# Patient Record
Sex: FEMALE
[De-identification: names, ages, dates, MRNs, and addresses within clinical notes are randomized per-mention and may not be internally consistent; named-entity substitution may affect disease eponyms.]

---

## 2018-03-06 ENCOUNTER — LAB SERVICES (OUTPATIENT)
Dept: OTHER | Age: 58
End: 2018-03-06

## 2018-03-06 ENCOUNTER — CHARTING TRANS (OUTPATIENT)
Dept: OTHER | Age: 58
End: 2018-03-06

## 2018-03-06 LAB — RAPID STREP GROUP A: NORMAL

## 2018-11-01 VITALS
DIASTOLIC BLOOD PRESSURE: 74 MMHG | HEART RATE: 64 BPM | BODY MASS INDEX: 26.73 KG/M2 | RESPIRATION RATE: 16 BRPM | SYSTOLIC BLOOD PRESSURE: 102 MMHG | OXYGEN SATURATION: 98 % | HEIGHT: 67 IN | TEMPERATURE: 98.5 F | WEIGHT: 170.3 LBS

## 2019-02-19 ENCOUNTER — OFFICE VISIT (OUTPATIENT)
Dept: INTERNAL MEDICINE CLINIC | Facility: CLINIC | Age: 59
End: 2019-02-19
Payer: COMMERCIAL

## 2019-02-19 ENCOUNTER — HOSPITAL ENCOUNTER (OUTPATIENT)
Dept: GENERAL RADIOLOGY | Facility: HOSPITAL | Age: 59
Discharge: HOME | End: 2019-02-19
Attending: PHYSICIAN ASSISTANT
Payer: COMMERCIAL

## 2019-02-19 ENCOUNTER — APPOINTMENT (OUTPATIENT)
Dept: LAB | Facility: HOSPITAL | Age: 59
End: 2019-02-19
Attending: PHYSICIAN ASSISTANT
Payer: COMMERCIAL

## 2019-02-19 VITALS
DIASTOLIC BLOOD PRESSURE: 77 MMHG | HEIGHT: 67 IN | SYSTOLIC BLOOD PRESSURE: 132 MMHG | WEIGHT: 168 LBS | HEART RATE: 57 BPM | BODY MASS INDEX: 26.37 KG/M2

## 2019-02-19 DIAGNOSIS — R07.89 OTHER CHEST PAIN: ICD-10-CM

## 2019-02-19 DIAGNOSIS — Z12.31 VISIT FOR SCREENING MAMMOGRAM: ICD-10-CM

## 2019-02-19 DIAGNOSIS — Z00.00 ROUTINE PHYSICAL EXAMINATION: Primary | ICD-10-CM

## 2019-02-19 DIAGNOSIS — Z78.0 MENOPAUSE: ICD-10-CM

## 2019-02-19 DIAGNOSIS — Z12.4 CERVICAL CANCER SCREENING: ICD-10-CM

## 2019-02-19 PROCEDURE — 99396 PREV VISIT EST AGE 40-64: CPT | Performed by: PHYSICIAN ASSISTANT

## 2019-02-19 PROCEDURE — 71046 X-RAY EXAM CHEST 2 VIEWS: CPT | Performed by: PHYSICIAN ASSISTANT

## 2019-02-19 PROCEDURE — 93005 ELECTROCARDIOGRAM TRACING: CPT

## 2019-02-19 PROCEDURE — 93010 ELECTROCARDIOGRAM REPORT: CPT | Performed by: PHYSICIAN ASSISTANT

## 2019-02-19 NOTE — PROGRESS NOTES
HPI:    Patient ID: Porter Zhu is a 59year old female. HPI   Patient presents today requesting physical exam.  She has no significant past medical history. She has not been seen in the office since 2/26/16.  At that time underwent physical exam.  Lab chest pain. Negative for palpitations and leg swelling. Gastrointestinal: Negative for nausea, vomiting, abdominal pain and diarrhea. Genitourinary: Negative for dysuria, frequency and hematuria. Musculoskeletal: Positive for myalgias (right arm).  Ne adnexum displays no mass, no tenderness and no fullness. Musculoskeletal: Normal range of motion. She exhibits no tenderness. Lymphadenopathy:     She has no cervical adenopathy. Neurological: She is alert and oriented to person, place, and time.  No Menopause  Currently asymptomatic. Orders for repeat dexa scan generated. - XR DEXA BONE DENSITOMETRY (CPT=77080); Future    5. Cervical cancer screening  Bimanual exam unremarkable. Pap/HPV collected and will contact patient with results.  If normal, rep

## 2019-02-20 LAB — HPV I/H RISK 1 DNA SPEC QL NAA+PROBE: NEGATIVE

## 2019-02-23 ENCOUNTER — LAB ENCOUNTER (OUTPATIENT)
Dept: LAB | Facility: HOSPITAL | Age: 59
End: 2019-02-23
Attending: PHYSICIAN ASSISTANT
Payer: COMMERCIAL

## 2019-02-23 DIAGNOSIS — Z00.00 ROUTINE PHYSICAL EXAMINATION: ICD-10-CM

## 2019-02-23 LAB
ALBUMIN SERPL-MCNC: 3.9 G/DL (ref 3.4–5)
ALBUMIN/GLOB SERPL: 1.3 {RATIO} (ref 1–2)
ALP LIVER SERPL-CCNC: 50 U/L (ref 46–118)
ALT SERPL-CCNC: 24 U/L (ref 13–56)
ANION GAP SERPL CALC-SCNC: 4 MMOL/L (ref 0–18)
AST SERPL-CCNC: 15 U/L (ref 15–37)
BACTERIA UR QL AUTO: NEGATIVE /HPF
BASOPHILS # BLD AUTO: 0.06 X10(3) UL (ref 0–0.2)
BASOPHILS NFR BLD AUTO: 0.9 %
BILIRUB SERPL-MCNC: 0.5 MG/DL (ref 0.1–2)
BILIRUB UR QL: NEGATIVE
BUN BLD-MCNC: 20 MG/DL (ref 7–18)
BUN/CREAT SERPL: 19.8 (ref 10–20)
CALCIUM BLD-MCNC: 9.7 MG/DL (ref 8.5–10.1)
CHLORIDE SERPL-SCNC: 111 MMOL/L (ref 98–107)
CHOLEST SMN-MCNC: 182 MG/DL (ref ?–200)
CLARITY UR: CLEAR
CO2 SERPL-SCNC: 28 MMOL/L (ref 21–32)
COLOR UR: YELLOW
CREAT BLD-MCNC: 1.01 MG/DL (ref 0.55–1.02)
DEPRECATED RDW RBC AUTO: 43 FL (ref 35.1–46.3)
EOSINOPHIL # BLD AUTO: 0.3 X10(3) UL (ref 0–0.7)
EOSINOPHIL NFR BLD AUTO: 4.3 %
ERYTHROCYTE [DISTWIDTH] IN BLOOD BY AUTOMATED COUNT: 12.3 % (ref 11–15)
GLOBULIN PLAS-MCNC: 3 G/DL (ref 2.8–4.4)
GLUCOSE BLD-MCNC: 99 MG/DL (ref 70–99)
GLUCOSE UR-MCNC: NEGATIVE MG/DL
HCT VFR BLD AUTO: 46.6 % (ref 35–48)
HDLC SERPL-MCNC: 44 MG/DL (ref 40–59)
HGB BLD-MCNC: 15.1 G/DL (ref 12–16)
HGB UR QL STRIP.AUTO: NEGATIVE
IMM GRANULOCYTES # BLD AUTO: 0.03 X10(3) UL (ref 0–1)
IMM GRANULOCYTES NFR BLD: 0.4 %
KETONES UR-MCNC: NEGATIVE MG/DL
LDLC SERPL CALC-MCNC: 118 MG/DL (ref ?–100)
LYMPHOCYTES # BLD AUTO: 2.51 X10(3) UL (ref 1–4)
LYMPHOCYTES NFR BLD AUTO: 36.3 %
M PROTEIN MFR SERPL ELPH: 6.9 G/DL (ref 6.4–8.2)
MCH RBC QN AUTO: 30.9 PG (ref 26–34)
MCHC RBC AUTO-ENTMCNC: 32.4 G/DL (ref 31–37)
MCV RBC AUTO: 95.5 FL (ref 80–100)
MONOCYTES # BLD AUTO: 0.48 X10(3) UL (ref 0.1–1)
MONOCYTES NFR BLD AUTO: 6.9 %
NEUTROPHILS # BLD AUTO: 3.54 X10 (3) UL (ref 1.5–7.7)
NEUTROPHILS # BLD AUTO: 3.54 X10(3) UL (ref 1.5–7.7)
NEUTROPHILS NFR BLD AUTO: 51.2 %
NITRITE UR QL STRIP.AUTO: NEGATIVE
NONHDLC SERPL-MCNC: 138 MG/DL (ref ?–130)
OSMOLALITY SERPL CALC.SUM OF ELEC: 299 MOSM/KG (ref 275–295)
PH UR: 5 [PH] (ref 5–8)
PLATELET # BLD AUTO: 180 10(3)UL (ref 150–450)
POTASSIUM SERPL-SCNC: 4.5 MMOL/L (ref 3.5–5.1)
PROT UR-MCNC: NEGATIVE MG/DL
RBC # BLD AUTO: 4.88 X10(6)UL (ref 3.8–5.3)
RBC #/AREA URNS AUTO: <1 /HPF
SODIUM SERPL-SCNC: 143 MMOL/L (ref 136–145)
SP GR UR STRIP: 1.02 (ref 1–1.03)
T4 FREE SERPL-MCNC: 0.9 NG/DL (ref 0.8–1.7)
TRIGL SERPL-MCNC: 98 MG/DL (ref 30–149)
TSI SER-ACNC: 4.01 MIU/ML (ref 0.36–3.74)
UROBILINOGEN UR STRIP-ACNC: <2
VIT C UR-MCNC: 40 MG/DL
VLDLC SERPL CALC-MCNC: 20 MG/DL (ref 0–30)
WBC # BLD AUTO: 6.9 X10(3) UL (ref 4–11)
WBC #/AREA URNS AUTO: 1 /HPF

## 2019-02-23 PROCEDURE — 84439 ASSAY OF FREE THYROXINE: CPT

## 2019-02-23 PROCEDURE — 80061 LIPID PANEL: CPT

## 2019-02-23 PROCEDURE — 36415 COLL VENOUS BLD VENIPUNCTURE: CPT

## 2019-02-23 PROCEDURE — 85025 COMPLETE CBC W/AUTO DIFF WBC: CPT

## 2019-02-23 PROCEDURE — 80053 COMPREHEN METABOLIC PANEL: CPT

## 2019-02-23 PROCEDURE — 84443 ASSAY THYROID STIM HORMONE: CPT

## 2019-02-23 PROCEDURE — 81001 URINALYSIS AUTO W/SCOPE: CPT

## 2019-03-04 ENCOUNTER — HOSPITAL ENCOUNTER (OUTPATIENT)
Dept: BONE DENSITY | Facility: HOSPITAL | Age: 59
Discharge: HOME | End: 2019-03-04
Attending: PHYSICIAN ASSISTANT
Payer: COMMERCIAL

## 2019-03-04 DIAGNOSIS — Z78.0 MENOPAUSE: ICD-10-CM

## 2019-03-04 PROCEDURE — 77080 DXA BONE DENSITY AXIAL: CPT | Performed by: PHYSICIAN ASSISTANT

## 2019-03-20 ENCOUNTER — HOSPITAL ENCOUNTER (OUTPATIENT)
Dept: MAMMOGRAPHY | Facility: HOSPITAL | Age: 59
Discharge: HOME | End: 2019-03-20
Attending: PHYSICIAN ASSISTANT
Payer: COMMERCIAL

## 2019-03-20 DIAGNOSIS — Z12.31 VISIT FOR SCREENING MAMMOGRAM: ICD-10-CM

## 2019-03-20 PROCEDURE — 77067 SCR MAMMO BI INCL CAD: CPT | Performed by: PHYSICIAN ASSISTANT

## 2019-03-20 PROCEDURE — 77063 BREAST TOMOSYNTHESIS BI: CPT | Performed by: PHYSICIAN ASSISTANT

## 2019-03-26 ENCOUNTER — HOSPITAL ENCOUNTER (OUTPATIENT)
Dept: CT IMAGING | Age: 59
Discharge: HOME | End: 2019-03-26
Attending: PHYSICIAN ASSISTANT

## 2019-03-26 DIAGNOSIS — Z13.9 SCREENING PROCEDURE: ICD-10-CM

## 2019-03-26 NOTE — PROGRESS NOTES
[facility] [address] SCORE=0  AY=872/70  Chol[place] labs as follows:labs done 2/23/19  TC=  HDL=  LDL=  TG=  GLUCOSE=  All results and risk factors discussed with patient; all questions and concerns addressed.   Educational handout

## 2019-03-29 ENCOUNTER — HOSPITAL ENCOUNTER (OUTPATIENT)
Dept: MAMMOGRAPHY | Facility: HOSPITAL | Age: 59
Discharge: HOME | End: 2019-03-29
Attending: PHYSICIAN ASSISTANT
Payer: COMMERCIAL

## 2019-03-29 ENCOUNTER — HOSPITAL ENCOUNTER (OUTPATIENT)
Dept: ULTRASOUND IMAGING | Facility: HOSPITAL | Age: 59
Discharge: HOME | End: 2019-03-29
Attending: PHYSICIAN ASSISTANT
Payer: COMMERCIAL

## 2019-03-29 DIAGNOSIS — N64.89 BREAST ASYMMETRY: ICD-10-CM

## 2019-03-29 DIAGNOSIS — R92.8 ABNORMALITY OF RIGHT BREAST ON SCREENING MAMMOGRAM: ICD-10-CM

## 2019-03-29 PROCEDURE — 76642 ULTRASOUND BREAST LIMITED: CPT | Performed by: PHYSICIAN ASSISTANT

## 2019-03-29 PROCEDURE — 77061 BREAST TOMOSYNTHESIS UNI: CPT | Performed by: PHYSICIAN ASSISTANT

## 2019-03-29 PROCEDURE — 77065 DX MAMMO INCL CAD UNI: CPT | Performed by: PHYSICIAN ASSISTANT

## 2020-02-21 ENCOUNTER — OFFICE VISIT (OUTPATIENT)
Dept: FAMILY MEDICINE CLINIC | Facility: CLINIC | Age: 60
End: 2020-02-21
Payer: COMMERCIAL

## 2020-02-21 VITALS
WEIGHT: 172 LBS | OXYGEN SATURATION: 97 % | BODY MASS INDEX: 27 KG/M2 | HEART RATE: 52 BPM | RESPIRATION RATE: 16 BRPM | SYSTOLIC BLOOD PRESSURE: 128 MMHG | HEIGHT: 67 IN | DIASTOLIC BLOOD PRESSURE: 72 MMHG | TEMPERATURE: 97 F

## 2020-02-21 DIAGNOSIS — N60.09 CYST OF BREAST, UNSPECIFIED LATERALITY: ICD-10-CM

## 2020-02-21 DIAGNOSIS — J40 BRONCHITIS: ICD-10-CM

## 2020-02-21 DIAGNOSIS — Z00.00 ROUTINE GENERAL MEDICAL EXAMINATION AT A HEALTH CARE FACILITY: Primary | ICD-10-CM

## 2020-02-21 DIAGNOSIS — Z12.31 VISIT FOR SCREENING MAMMOGRAM: ICD-10-CM

## 2020-02-21 PROCEDURE — 99386 PREV VISIT NEW AGE 40-64: CPT | Performed by: PHYSICIAN ASSISTANT

## 2020-02-21 RX ORDER — BENZONATATE 200 MG/1
200 CAPSULE ORAL 3 TIMES DAILY PRN
Qty: 30 CAPSULE | Refills: 0 | Status: SHIPPED | OUTPATIENT
Start: 2020-02-21 | End: 2020-03-14

## 2020-02-21 NOTE — PROGRESS NOTES
Tammie Enriquez is a 62year old female. Patient presents with:  Physical      HPI:   Patient presents today requesting physical exam. Overall feeling well. Has concerns today about owing sinus symptoms and nonproductive cough.   2 weeks ago she started havin status: Never Smoker      Smokeless tobacco: Never Used    Alcohol use: Yes      Alcohol/week: 0.0 standard drinks      Comment: 2 drinks a month    Drug use: No       REVIEW OF SYSTEMS:   GENERAL HEALTH: Denies fevers, chills, sweats, and fatigue.   EYES: hernia. ; Anabelle normal with no erythema or tenderness, no discharge. No CMT, uterus with no palpable abnormalities. Adnexal fullness or mass. MUSK: No cyanosis. Distal pulses intact bilaterally. Normal spinal curvature.  No midline or paraspinal tende

## 2020-03-07 ENCOUNTER — LAB ENCOUNTER (OUTPATIENT)
Dept: LAB | Facility: HOSPITAL | Age: 60
End: 2020-03-07
Attending: PHYSICIAN ASSISTANT
Payer: COMMERCIAL

## 2020-03-07 DIAGNOSIS — Z00.00 ROUTINE GENERAL MEDICAL EXAMINATION AT A HEALTH CARE FACILITY: ICD-10-CM

## 2020-03-07 LAB
ALBUMIN SERPL-MCNC: 3.9 G/DL (ref 3.4–5)
ALBUMIN/GLOB SERPL: 1.1 {RATIO} (ref 1–2)
ALP LIVER SERPL-CCNC: 58 U/L (ref 50–130)
ALT SERPL-CCNC: 36 U/L (ref 13–56)
ANION GAP SERPL CALC-SCNC: 4 MMOL/L (ref 0–18)
AST SERPL-CCNC: 14 U/L (ref 15–37)
BASOPHILS # BLD AUTO: 0.12 X10(3) UL (ref 0–0.2)
BASOPHILS NFR BLD AUTO: 0.9 %
BILIRUB SERPL-MCNC: 0.4 MG/DL (ref 0.1–2)
BUN BLD-MCNC: 23 MG/DL (ref 7–18)
BUN/CREAT SERPL: 23.7 (ref 10–20)
CALCIUM BLD-MCNC: 10 MG/DL (ref 8.5–10.1)
CHLORIDE SERPL-SCNC: 110 MMOL/L (ref 98–112)
CHOLEST SMN-MCNC: 192 MG/DL (ref ?–200)
CO2 SERPL-SCNC: 28 MMOL/L (ref 21–32)
CREAT BLD-MCNC: 0.97 MG/DL (ref 0.55–1.02)
DEPRECATED RDW RBC AUTO: 44.2 FL (ref 35.1–46.3)
EOSINOPHIL # BLD AUTO: 0.37 X10(3) UL (ref 0–0.7)
EOSINOPHIL NFR BLD AUTO: 2.9 %
ERYTHROCYTE [DISTWIDTH] IN BLOOD BY AUTOMATED COUNT: 12.5 % (ref 11–15)
GLOBULIN PLAS-MCNC: 3.4 G/DL (ref 2.8–4.4)
GLUCOSE BLD-MCNC: 88 MG/DL (ref 70–99)
HCT VFR BLD AUTO: 45 % (ref 35–48)
HDLC SERPL-MCNC: 45 MG/DL (ref 40–59)
HGB BLD-MCNC: 14.6 G/DL (ref 12–16)
IMM GRANULOCYTES # BLD AUTO: 0.09 X10(3) UL (ref 0–1)
IMM GRANULOCYTES NFR BLD: 0.7 %
LDLC SERPL CALC-MCNC: 121 MG/DL (ref ?–100)
LYMPHOCYTES # BLD AUTO: 2.41 X10(3) UL (ref 1–4)
LYMPHOCYTES NFR BLD AUTO: 18.8 %
M PROTEIN MFR SERPL ELPH: 7.3 G/DL (ref 6.4–8.2)
MCH RBC QN AUTO: 31.3 PG (ref 26–34)
MCHC RBC AUTO-ENTMCNC: 32.4 G/DL (ref 31–37)
MCV RBC AUTO: 96.4 FL (ref 80–100)
MONOCYTES # BLD AUTO: 0.61 X10(3) UL (ref 0.1–1)
MONOCYTES NFR BLD AUTO: 4.8 %
NEUTROPHILS # BLD AUTO: 9.21 X10 (3) UL (ref 1.5–7.7)
NEUTROPHILS # BLD AUTO: 9.21 X10(3) UL (ref 1.5–7.7)
NEUTROPHILS NFR BLD AUTO: 71.9 %
NONHDLC SERPL-MCNC: 147 MG/DL (ref ?–130)
OSMOLALITY SERPL CALC.SUM OF ELEC: 297 MOSM/KG (ref 275–295)
PATIENT FASTING Y/N/NP: YES
PATIENT FASTING Y/N/NP: YES
PLATELET # BLD AUTO: 217 10(3)UL (ref 150–450)
POTASSIUM SERPL-SCNC: 4.7 MMOL/L (ref 3.5–5.1)
RBC # BLD AUTO: 4.67 X10(6)UL (ref 3.8–5.3)
SODIUM SERPL-SCNC: 142 MMOL/L (ref 136–145)
TRIGL SERPL-MCNC: 129 MG/DL (ref 30–149)
TSI SER-ACNC: 2.86 MIU/ML (ref 0.36–3.74)
VLDLC SERPL CALC-MCNC: 26 MG/DL (ref 0–30)
WBC # BLD AUTO: 12.8 X10(3) UL (ref 4–11)

## 2020-03-07 PROCEDURE — 36415 COLL VENOUS BLD VENIPUNCTURE: CPT

## 2020-03-07 PROCEDURE — 80061 LIPID PANEL: CPT

## 2020-03-07 PROCEDURE — 84443 ASSAY THYROID STIM HORMONE: CPT

## 2020-03-07 PROCEDURE — 80053 COMPREHEN METABOLIC PANEL: CPT

## 2020-03-07 PROCEDURE — 85025 COMPLETE CBC W/AUTO DIFF WBC: CPT

## 2020-03-09 ENCOUNTER — E-VISIT (OUTPATIENT)
Dept: FAMILY MEDICINE CLINIC | Facility: CLINIC | Age: 60
End: 2020-03-09

## 2020-03-09 DIAGNOSIS — Z02.9 ENCOUNTERS FOR ADMINISTRATIVE PURPOSE: Primary | ICD-10-CM

## 2020-03-09 RX ORDER — AZITHROMYCIN 250 MG/1
TABLET, FILM COATED ORAL
Qty: 6 TABLET | Refills: 0 | Status: SHIPPED | OUTPATIENT
Start: 2020-03-09 | End: 2021-02-17 | Stop reason: ALTCHOICE

## 2020-03-09 NOTE — PROGRESS NOTES
Pt submitted e-visit for cough for 1 month. Pt was seen on 2/21 at PCP office for bronchitis and treated with benzonatate; pt was also having sinus sx. Per visit note, pt was asked to f/u with provider if sx did not improve.   Advised pt to f/u at PCP off

## 2020-03-12 ENCOUNTER — PATIENT MESSAGE (OUTPATIENT)
Dept: FAMILY MEDICINE CLINIC | Facility: CLINIC | Age: 60
End: 2020-03-12

## 2020-03-12 DIAGNOSIS — J40 BRONCHITIS: ICD-10-CM

## 2020-03-13 NOTE — TELEPHONE ENCOUNTER
From: Maria T Allred  To: Lien Garcia PA-C  Sent: 3/12/2020 6:39 PM CDT  Subject: Non-Urgent Medical Question    Camacho Roberts, thank you for the z-pack. I wanted to let you know how I've been feeling.  I felt better after the first dose but have developed a pe

## 2020-03-14 RX ORDER — BENZONATATE 200 MG/1
200 CAPSULE ORAL 3 TIMES DAILY PRN
Qty: 30 CAPSULE | Refills: 0 | Status: SHIPPED | OUTPATIENT
Start: 2020-03-14 | End: 2020-03-16

## 2020-03-16 RX ORDER — BENZONATATE 200 MG/1
200 CAPSULE ORAL 3 TIMES DAILY PRN
Qty: 30 CAPSULE | Refills: 0 | Status: SHIPPED | OUTPATIENT
Start: 2020-03-16 | End: 2021-02-17 | Stop reason: ALTCHOICE

## 2020-07-21 ENCOUNTER — HOSPITAL ENCOUNTER (OUTPATIENT)
Dept: ULTRASOUND IMAGING | Facility: HOSPITAL | Age: 60
Discharge: HOME | End: 2020-07-21
Attending: PHYSICIAN ASSISTANT
Payer: COMMERCIAL

## 2020-07-21 ENCOUNTER — HOSPITAL ENCOUNTER (OUTPATIENT)
Dept: MAMMOGRAPHY | Facility: HOSPITAL | Age: 60
Discharge: HOME | End: 2020-07-21
Attending: PHYSICIAN ASSISTANT
Payer: COMMERCIAL

## 2020-07-21 DIAGNOSIS — N60.09 CYST OF BREAST, UNSPECIFIED LATERALITY: ICD-10-CM

## 2020-07-21 DIAGNOSIS — Z12.31 VISIT FOR SCREENING MAMMOGRAM: ICD-10-CM

## 2020-07-21 PROCEDURE — 76642 ULTRASOUND BREAST LIMITED: CPT | Performed by: PHYSICIAN ASSISTANT

## 2020-07-21 PROCEDURE — 77062 BREAST TOMOSYNTHESIS BI: CPT | Performed by: PHYSICIAN ASSISTANT

## 2020-07-21 PROCEDURE — 77066 DX MAMMO INCL CAD BI: CPT | Performed by: PHYSICIAN ASSISTANT

## 2020-07-21 NOTE — IMAGING NOTE
This nurse introduced self and role of breast coordinator. Discussed recommended breast biopsy with patient. Pt was recommended by Dr Cristina Mosher to have a  right breast ultrasound guided biopsy. Miss Sea Vasquez is single has no children .  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  It is recommended that this patient be evaluated in our [address] [place] [address] to determine   eligibility for additional breast cancer screening, risk reduction strategies and/o technician will use the ultrasound machine to locate the area in question that was seen on your previous breast imaging. The Radiologist will then inject a local numbing medication into the area. This may burn and sting for several seconds .   Then use a n physician unless otherwise indicated. Educated patient that once we receive an order from her physician  our radiology secretaries would be calling   to schedule the biopsy.

## 2020-07-24 ENCOUNTER — HOSPITAL ENCOUNTER (OUTPATIENT)
Dept: ULTRASOUND IMAGING | Facility: HOSPITAL | Age: 60
Discharge: HOME | End: 2020-07-24
Attending: PHYSICIAN ASSISTANT
Payer: COMMERCIAL

## 2020-07-24 ENCOUNTER — HOSPITAL ENCOUNTER (OUTPATIENT)
Dept: MAMMOGRAPHY | Facility: HOSPITAL | Age: 60
Discharge: HOME | End: 2020-07-24
Attending: PHYSICIAN ASSISTANT
Payer: COMMERCIAL

## 2020-07-24 VITALS — HEART RATE: 55 BPM | DIASTOLIC BLOOD PRESSURE: 62 MMHG | SYSTOLIC BLOOD PRESSURE: 114 MMHG

## 2020-07-24 DIAGNOSIS — N63.10 BREAST MASS, RIGHT: ICD-10-CM

## 2020-07-24 PROCEDURE — 19083 BX BREAST 1ST LESION US IMAG: CPT | Performed by: PHYSICIAN ASSISTANT

## 2020-07-24 PROCEDURE — 77065 DX MAMMO INCL CAD UNI: CPT | Performed by: PHYSICIAN ASSISTANT

## 2020-07-24 PROCEDURE — 88305 TISSUE EXAM BY PATHOLOGIST: CPT | Performed by: PHYSICIAN ASSISTANT

## 2020-07-24 NOTE — PROCEDURES
[facility] HOSP - [facility]  Procedure Note    Malorie Later Patient Status:  Outpatient    1960 MRN [id number]   Location [address] Attending Fabricio Jung [employer] Day # 0 PCP Reji Hillman PA-C     Proc yes...

## 2020-07-27 ENCOUNTER — TELEPHONE (OUTPATIENT)
Dept: ULTRASOUND IMAGING | Facility: HOSPITAL | Age: 60
End: 2020-07-27

## 2020-07-27 NOTE — IMAGING NOTE
Miss Percy Davis  is s/p biopsy . Phoned at 1250 Pm no answer  Recalled at [phone] and introduced myself as breast coordinator . Reinforced to patient  post biopsy care and instructions .     Informed  and shared the pathology results as well as the recommendation

## 2020-07-27 NOTE — TELEPHONE ENCOUNTER
Attempted to reach Miss Salena Ward regarding follow up post procedure and to review results and recommendations no answer left message for her to retun call to [phone] to go over results and recommendations

## 2020-12-21 ENCOUNTER — TELEPHONE (OUTPATIENT)
Dept: FAMILY MEDICINE CLINIC | Facility: CLINIC | Age: 60
End: 2020-12-21

## 2020-12-21 DIAGNOSIS — R92.8 FOLLOW-UP EXAMINATION OF ABNORMAL MAMMOGRAM: Primary | ICD-10-CM

## 2021-01-12 ENCOUNTER — HOSPITAL ENCOUNTER (OUTPATIENT)
Dept: MAMMOGRAPHY | Facility: HOSPITAL | Age: 61
Discharge: HOME | End: 2021-01-12
Attending: PHYSICIAN ASSISTANT
Payer: COMMERCIAL

## 2021-01-12 ENCOUNTER — HOSPITAL ENCOUNTER (OUTPATIENT)
Dept: ULTRASOUND IMAGING | Facility: HOSPITAL | Age: 61
Discharge: HOME | End: 2021-01-12
Attending: PHYSICIAN ASSISTANT
Payer: COMMERCIAL

## 2021-01-12 DIAGNOSIS — R92.8 FOLLOW-UP EXAMINATION OF ABNORMAL MAMMOGRAM: ICD-10-CM

## 2021-01-12 PROCEDURE — 76642 ULTRASOUND BREAST LIMITED: CPT | Performed by: PHYSICIAN ASSISTANT

## 2021-01-12 PROCEDURE — 77061 BREAST TOMOSYNTHESIS UNI: CPT | Performed by: PHYSICIAN ASSISTANT

## 2021-01-12 PROCEDURE — 77065 DX MAMMO INCL CAD UNI: CPT | Performed by: PHYSICIAN ASSISTANT

## 2021-02-17 ENCOUNTER — OFFICE VISIT (OUTPATIENT)
Dept: FAMILY MEDICINE CLINIC | Facility: CLINIC | Age: 61
End: 2021-02-17
Payer: COMMERCIAL

## 2021-02-17 ENCOUNTER — LAB ENCOUNTER (OUTPATIENT)
Dept: LAB | Age: 61
End: 2021-02-17
Attending: PHYSICIAN ASSISTANT
Payer: COMMERCIAL

## 2021-02-17 VITALS
HEIGHT: 67 IN | SYSTOLIC BLOOD PRESSURE: 116 MMHG | HEART RATE: 62 BPM | DIASTOLIC BLOOD PRESSURE: 80 MMHG | OXYGEN SATURATION: 94 % | BODY MASS INDEX: 26.21 KG/M2 | WEIGHT: 167 LBS | RESPIRATION RATE: 16 BRPM

## 2021-02-17 DIAGNOSIS — Z00.00 ROUTINE GENERAL MEDICAL EXAMINATION AT A HEALTH CARE FACILITY: ICD-10-CM

## 2021-02-17 DIAGNOSIS — R92.2 DENSE BREAST TISSUE ON MAMMOGRAM: ICD-10-CM

## 2021-02-17 DIAGNOSIS — Z12.11 COLON CANCER SCREENING: ICD-10-CM

## 2021-02-17 DIAGNOSIS — Z00.00 ROUTINE GENERAL MEDICAL EXAMINATION AT A HEALTH CARE FACILITY: Primary | ICD-10-CM

## 2021-02-17 DIAGNOSIS — R92.8 ABNORMAL MAMMOGRAM OF RIGHT BREAST: ICD-10-CM

## 2021-02-17 LAB
ALBUMIN SERPL-MCNC: 4.1 G/DL (ref 3.4–5)
ALBUMIN/GLOB SERPL: 1.3 {RATIO} (ref 1–2)
ALP LIVER SERPL-CCNC: 49 U/L
ALT SERPL-CCNC: 22 U/L
ANION GAP SERPL CALC-SCNC: 5 MMOL/L (ref 0–18)
AST SERPL-CCNC: 10 U/L (ref 15–37)
BASOPHILS # BLD AUTO: 0.11 X10(3) UL (ref 0–0.2)
BASOPHILS NFR BLD AUTO: 1.1 %
BILIRUB SERPL-MCNC: 0.5 MG/DL (ref 0.1–2)
BUN BLD-MCNC: 16 MG/DL (ref 7–18)
BUN/CREAT SERPL: 16.3 (ref 10–20)
CALCIUM BLD-MCNC: 9.8 MG/DL (ref 8.5–10.1)
CHLORIDE SERPL-SCNC: 110 MMOL/L (ref 98–112)
CHOLEST SMN-MCNC: 195 MG/DL (ref ?–200)
CO2 SERPL-SCNC: 27 MMOL/L (ref 21–32)
CREAT BLD-MCNC: 0.98 MG/DL
DEPRECATED RDW RBC AUTO: 45.8 FL (ref 35.1–46.3)
EOSINOPHIL # BLD AUTO: 0.32 X10(3) UL (ref 0–0.7)
EOSINOPHIL NFR BLD AUTO: 3.1 %
ERYTHROCYTE [DISTWIDTH] IN BLOOD BY AUTOMATED COUNT: 12.8 % (ref 11–15)
GLOBULIN PLAS-MCNC: 3.1 G/DL (ref 2.8–4.4)
GLUCOSE BLD-MCNC: 91 MG/DL (ref 70–99)
HCT VFR BLD AUTO: 46.3 %
HDLC SERPL-MCNC: 48 MG/DL (ref 40–59)
HGB BLD-MCNC: 14.9 G/DL
IMM GRANULOCYTES # BLD AUTO: 0.05 X10(3) UL (ref 0–1)
IMM GRANULOCYTES NFR BLD: 0.5 %
LDLC SERPL CALC-MCNC: 124 MG/DL (ref ?–100)
LYMPHOCYTES # BLD AUTO: 1.99 X10(3) UL (ref 1–4)
LYMPHOCYTES NFR BLD AUTO: 19 %
M PROTEIN MFR SERPL ELPH: 7.2 G/DL (ref 6.4–8.2)
MCH RBC QN AUTO: 31.1 PG (ref 26–34)
MCHC RBC AUTO-ENTMCNC: 32.2 G/DL (ref 31–37)
MCV RBC AUTO: 96.7 FL
MONOCYTES # BLD AUTO: 0.62 X10(3) UL (ref 0.1–1)
MONOCYTES NFR BLD AUTO: 5.9 %
NEUTROPHILS # BLD AUTO: 7.38 X10 (3) UL (ref 1.5–7.7)
NEUTROPHILS # BLD AUTO: 7.38 X10(3) UL (ref 1.5–7.7)
NEUTROPHILS NFR BLD AUTO: 70.4 %
NONHDLC SERPL-MCNC: 147 MG/DL (ref ?–130)
OSMOLALITY SERPL CALC.SUM OF ELEC: 295 MOSM/KG (ref 275–295)
PATIENT FASTING Y/N/NP: YES
PATIENT FASTING Y/N/NP: YES
PLATELET # BLD AUTO: 176 10(3)UL (ref 150–450)
POTASSIUM SERPL-SCNC: 4.4 MMOL/L (ref 3.5–5.1)
RBC # BLD AUTO: 4.79 X10(6)UL
SODIUM SERPL-SCNC: 142 MMOL/L (ref 136–145)
TRIGL SERPL-MCNC: 113 MG/DL (ref 30–149)
TSI SER-ACNC: 3.22 MIU/ML (ref 0.36–3.74)
VIT B12 SERPL-MCNC: 751 PG/ML (ref 193–986)
VIT D+METAB SERPL-MCNC: 43.2 NG/ML (ref 30–100)
VLDLC SERPL CALC-MCNC: 23 MG/DL (ref 0–30)
WBC # BLD AUTO: 10.5 X10(3) UL (ref 4–11)

## 2021-02-17 PROCEDURE — 3079F DIAST BP 80-89 MM HG: CPT | Performed by: PHYSICIAN ASSISTANT

## 2021-02-17 PROCEDURE — 3008F BODY MASS INDEX DOCD: CPT | Performed by: PHYSICIAN ASSISTANT

## 2021-02-17 PROCEDURE — 99396 PREV VISIT EST AGE 40-64: CPT | Performed by: PHYSICIAN ASSISTANT

## 2021-02-17 PROCEDURE — 36415 COLL VENOUS BLD VENIPUNCTURE: CPT

## 2021-02-17 PROCEDURE — 3074F SYST BP LT 130 MM HG: CPT | Performed by: PHYSICIAN ASSISTANT

## 2021-02-17 PROCEDURE — 82607 VITAMIN B-12: CPT

## 2021-02-17 PROCEDURE — 85025 COMPLETE CBC W/AUTO DIFF WBC: CPT

## 2021-02-17 PROCEDURE — 84443 ASSAY THYROID STIM HORMONE: CPT

## 2021-02-17 PROCEDURE — 80061 LIPID PANEL: CPT

## 2021-02-17 PROCEDURE — 80053 COMPREHEN METABOLIC PANEL: CPT

## 2021-02-17 PROCEDURE — 82306 VITAMIN D 25 HYDROXY: CPT

## 2021-02-17 NOTE — PROGRESS NOTES
HPI:    Patient ID: Eamon Villalba is a 56year old female. HPI   Patient presents today requesting physical exam.  Feeling well in general.  No concerns for discussion.     Diet: well balanced  Exercise: walking daily  Tobacco use: denies  Drug use: prabha Normocephalic and atraumatic.    Right Ear: Tympanic membrane and external ear normal.   Left Ear: Tympanic membrane and external ear normal.   Nose: Nose normal.   Mouth/Throat: Oropharynx is clear and moist.   Eyes: Pupils are equal, round, and reactive t physicals. - CBC WITH DIFFERENTIAL WITH PLATELET; Future  - COMP METABOLIC PANEL (14); Future  - LIPID PANEL; Future  - TSH W REFLEX TO FREE T4; Future  - VITAMIN B12; Future  - VITAMIN D, 25-HYDROXY; Future    2.  Colon cancer screening  Due for repeat c-

## 2021-03-02 ENCOUNTER — TELEPHONE (OUTPATIENT)
Dept: GASTROENTEROLOGY | Facility: CLINIC | Age: 61
End: 2021-03-02

## 2021-03-02 DIAGNOSIS — Z80.0 FAMILY HISTORY OF COLON CANCER: Primary | ICD-10-CM

## 2021-03-02 NOTE — TELEPHONE ENCOUNTER
ADMITTED:   04/08/2016        GI PROCEDURE NOTE     DATE OF PROCEDURE:  04/08/2016     PROCEDURE: Colonoscopy. SURGEON:   Caroline Javed MD     ATTENDING:  Camilo Marsh M.D. PREOPERATIVE DIAGNOSIS:  Family history of colon cancer.      POSTO Consuel[address] Weems 5        JOB NUMBER:         [id number]  ZEYNEP KAPLAN [facility] #: [id number]  MR #: [id number]                  Patient: Higinio Pablo      ===========================================

## 2021-03-02 NOTE — TELEPHONE ENCOUNTER
Pt called to schedule 5 yr repeat colonoscopy. Pt previously saw Dr. Deniz Dickey. Pt does not want to schedule office visit. Can she just schedule procedure? Please call.

## 2021-03-02 NOTE — TELEPHONE ENCOUNTER
Bisi Johns-    Patient called in to schedule 5 year CLN recall. Last done with Dr. Pamela Horvath 04/08/2016. Verified and confirmed allergies, medication list and preferred pharmacy.      Last Procedure, Date, MD:  CLN done 04/08/2016 with Dr. Cem Acevedo

## 2021-03-03 RX ORDER — SODIUM, POTASSIUM,MAG SULFATES 17.5-3.13G
SOLUTION, RECONSTITUTED, ORAL ORAL
Qty: 1 BOTTLE | Refills: 0 | Status: ON HOLD | OUTPATIENT
Start: 2021-03-03 | End: 2021-03-24

## 2021-03-03 NOTE — TELEPHONE ENCOUNTER
Scheduling:  cln w/ iv or mac w/ Dr. Bush Rank  Dx: Ignacio Preston h/o colon ca  suprep sent e-scribe

## 2021-03-05 NOTE — TELEPHONE ENCOUNTER
Patient calling for update on scheduling. Patient also wants to add she has a booster covid schedule Monday wants to ensure this will not interfere with colonoscopy. Please call at:[phone],thanks.

## 2021-03-05 NOTE — TELEPHONE ENCOUNTER
Scheduled for:  Colonoscopy - 38125  Provider Name:  Dr. Stacie Ryan  Date:  3/24/21  Location:  [facility]  Sedation:  MAC  Time:  7:30 am (pt is aware to arrive at 6:30 am)  Prep:  Suprep, Prep instructions were given to pt over the phone, pt verbalized underst

## 2021-03-22 ENCOUNTER — LAB ENCOUNTER (OUTPATIENT)
Dept: LAB | Facility: HOSPITAL | Age: 61
End: 2021-03-22
Attending: INTERNAL MEDICINE
Payer: COMMERCIAL

## 2021-03-22 DIAGNOSIS — Z01.818 PREOP TESTING: ICD-10-CM

## 2021-03-22 LAB — SARS-COV-2 RNA RESP QL NAA+PROBE: NOT DETECTED

## 2021-03-24 ENCOUNTER — ANESTHESIA EVENT (OUTPATIENT)
Dept: ENDOSCOPY | Age: 61
End: 2021-03-24
Payer: COMMERCIAL

## 2021-03-24 ENCOUNTER — ANESTHESIA (OUTPATIENT)
Dept: ENDOSCOPY | Age: 61
End: 2021-03-24
Payer: COMMERCIAL

## 2021-03-24 ENCOUNTER — HOSPITAL ENCOUNTER (OUTPATIENT)
Age: 61
Setting detail: HOSPITAL OUTPATIENT SURGERY
Discharge: HOME | End: 2021-03-24
Attending: INTERNAL MEDICINE | Admitting: INTERNAL MEDICINE
Payer: COMMERCIAL

## 2021-03-24 ENCOUNTER — TELEPHONE (OUTPATIENT)
Dept: GASTROENTEROLOGY | Facility: CLINIC | Age: 61
End: 2021-03-24

## 2021-03-24 VITALS
BODY MASS INDEX: 26.21 KG/M2 | RESPIRATION RATE: 12 BRPM | TEMPERATURE: 98 F | HEART RATE: 57 BPM | DIASTOLIC BLOOD PRESSURE: 62 MMHG | SYSTOLIC BLOOD PRESSURE: 115 MMHG | OXYGEN SATURATION: 100 % | HEIGHT: 67 IN | WEIGHT: 167 LBS

## 2021-03-24 DIAGNOSIS — Z86.19 HISTORY OF HEPATITIS: Primary | ICD-10-CM

## 2021-03-24 DIAGNOSIS — Z01.818 PREOP TESTING: Primary | ICD-10-CM

## 2021-03-24 DIAGNOSIS — Z80.0 FAMILY HISTORY OF COLON CANCER: ICD-10-CM

## 2021-03-24 PROCEDURE — 45378 DIAGNOSTIC COLONOSCOPY: CPT | Performed by: INTERNAL MEDICINE

## 2021-03-24 RX ORDER — SODIUM CHLORIDE, SODIUM LACTATE, POTASSIUM CHLORIDE, CALCIUM CHLORIDE 600; 310; 30; 20 MG/100ML; MG/100ML; MG/100ML; MG/100ML
INJECTION, SOLUTION INTRAVENOUS CONTINUOUS
Status: DISCONTINUED | OUTPATIENT
Start: 2021-03-24 | End: 2021-03-24

## 2021-03-24 RX ORDER — NALOXONE HYDROCHLORIDE 0.4 MG/ML
80 INJECTION, SOLUTION INTRAMUSCULAR; INTRAVENOUS; SUBCUTANEOUS AS NEEDED
Status: CANCELLED | OUTPATIENT
Start: 2021-03-24 | End: 2021-03-24

## 2021-03-24 RX ORDER — LIDOCAINE HYDROCHLORIDE 10 MG/ML
INJECTION, SOLUTION EPIDURAL; INFILTRATION; INTRACAUDAL; PERINEURAL AS NEEDED
Status: DISCONTINUED | OUTPATIENT
Start: 2021-03-24 | End: 2021-03-24 | Stop reason: SURG

## 2021-03-24 RX ORDER — SODIUM CHLORIDE, SODIUM LACTATE, POTASSIUM CHLORIDE, CALCIUM CHLORIDE 600; 310; 30; 20 MG/100ML; MG/100ML; MG/100ML; MG/100ML
INJECTION, SOLUTION INTRAVENOUS CONTINUOUS
Status: CANCELLED | OUTPATIENT
Start: 2021-03-24

## 2021-03-24 RX ADMIN — SODIUM CHLORIDE, SODIUM LACTATE, POTASSIUM CHLORIDE, CALCIUM CHLORIDE: 600; 310; 30; 20 INJECTION, SOLUTION INTRAVENOUS at 08:38:00

## 2021-03-24 RX ADMIN — SODIUM CHLORIDE, SODIUM LACTATE, POTASSIUM CHLORIDE, CALCIUM CHLORIDE: 600; 310; 30; 20 INJECTION, SOLUTION INTRAVENOUS at 08:11:00

## 2021-03-24 RX ADMIN — LIDOCAINE HYDROCHLORIDE 50 MG: 10 INJECTION, SOLUTION EPIDURAL; INFILTRATION; INTRACAUDAL; PERINEURAL at 08:12:00

## 2021-03-24 NOTE — ANESTHESIA PREPROCEDURE EVALUATION
Anesthesia PreOp Note    HPI:     Yolanda Bhatti is a 56year old female who presents for preoperative consultation requested by: Hugh Beaver MD    Date of Surgery: 3/24/2021    Procedure(s):  COLONOSCOPY  Indication: Family history of colon cancer    Rele therapy 1962   • Other (Pschyologicals problems) Mother    • Stroke Other         Grandmother    • Colon Cancer Other         Family history of    • Cancer Paternal Grandfather         Brain cancer 1959   • Cancer Paternal Grandmother         Colon cancer and Family:       Attends Congregation Services:       Active Member of Clubs or Organizations:       Attends Club or Organization Meetings:       Marital Status:   Intimate Partner Violence:       Fear of Current or Ex-Partner:       Emotionally Abused:     The patient desires the anesthetic management as planned.   Maggie Reyes  3/24/2021 7:50 AM

## 2021-03-24 NOTE — TELEPHONE ENCOUNTER
Entered into Epic. Recall CLN in 10 years per Dr. Yulisa Mcbride. Last CLN done 03/24/2021. Recall entered into Patient Outreach for 03/24/2031. HM updated.

## 2021-03-24 NOTE — H&P
Pre Procedure History & Physical Examination    Patient Name: Drew Thomson  MRN: [id number]  CSN: [id number]  YOB: 1960    Diagnosis: screening colonoscopy, prior negative, grandparent with colon CA    Na Sulfate-K Sulfate-Mg Sulf ([place] fevers, rigors  EYES:  negative for diplopia   RESPIRATORY:  negative for severe shortness of breath  CARDIOVASCULAR:  negative for crushing sub-sternal chest pain  GASTROINTESTINAL:  see HPI  GENITOURINARY:  negative for dysuria or gross hematuria  INTEGU

## 2021-03-24 NOTE — ANESTHESIA POSTPROCEDURE EVALUATION
Patient: Radha Rico    Procedure Summary     Date: 03/24/21 Room / Location: [facility] ENDOSCOPY 01 / [facility] ENDO    Anesthesia Start: 8765 Anesthesia Stop:     Procedure: COLONOSCOPY (N/A ) Diagnosis:       Family history of colon cancer      (diverticulosis

## 2021-03-24 NOTE — OPERATIVE REPORT
COLONOSCOPY REPORT    Lauren Cast    RDIGE 1960 Age 56year old   PCP Addison Sierra PA-C Endoscopist Giuseppe Arnold MD     Date of procedure: 21    Procedure: Colonoscopy     Pre-operative diagnosis: screening    Post-operative diagnosis: divertic masses palpated. Recommend:  · Recall 10 years given no primary family history of colon CA. Discussed with patient and was a grandparent late in life. If new signs or symptoms develop, colonoscopy may need to be repeated sooner. · High fiber diet.   ·

## 2021-03-24 NOTE — TELEPHONE ENCOUNTER
? History of hepatitis in high school/college  Will check panel as does not recall type or treatment  LFTs normal

## 2021-04-27 ENCOUNTER — TELEPHONE (OUTPATIENT)
Dept: GASTROENTEROLOGY | Facility: CLINIC | Age: 61
End: 2021-04-27

## 2021-04-27 ENCOUNTER — LAB ENCOUNTER (OUTPATIENT)
Dept: LAB | Facility: HOSPITAL | Age: 61
End: 2021-04-27
Attending: INTERNAL MEDICINE
Payer: COMMERCIAL

## 2021-04-27 PROCEDURE — 36415 COLL VENOUS BLD VENIPUNCTURE: CPT | Performed by: INTERNAL MEDICINE

## 2021-04-27 PROCEDURE — 80074 ACUTE HEPATITIS PANEL: CPT | Performed by: INTERNAL MEDICINE

## 2021-04-27 NOTE — TELEPHONE ENCOUNTER
Daria Araujo for order?
Okay for order, thank you
Order placed, Left detailed message.
Pt requesting her 6 mo post biopsy mammo order
No

## 2021-04-27 NOTE — TELEPHONE ENCOUNTER
1st reminder letter mailed to patient regards to her overdue lab order;     HEPATITIS PANEL, ACUTE (4)

## 2021-07-13 ENCOUNTER — HOSPITAL ENCOUNTER (OUTPATIENT)
Dept: ULTRASOUND IMAGING | Facility: HOSPITAL | Age: 61
Discharge: HOME | End: 2021-07-13
Attending: PHYSICIAN ASSISTANT
Payer: COMMERCIAL

## 2021-07-13 ENCOUNTER — HOSPITAL ENCOUNTER (OUTPATIENT)
Dept: MAMMOGRAPHY | Facility: HOSPITAL | Age: 61
Discharge: HOME | End: 2021-07-13
Attending: PHYSICIAN ASSISTANT
Payer: COMMERCIAL

## 2021-07-13 DIAGNOSIS — R92.8 ABNORMAL MAMMOGRAM OF RIGHT BREAST: ICD-10-CM

## 2021-07-13 DIAGNOSIS — R92.2 DENSE BREAST TISSUE ON MAMMOGRAM: ICD-10-CM

## 2021-07-13 PROCEDURE — 76642 ULTRASOUND BREAST LIMITED: CPT | Performed by: PHYSICIAN ASSISTANT

## 2021-07-13 PROCEDURE — 77066 DX MAMMO INCL CAD BI: CPT | Performed by: PHYSICIAN ASSISTANT

## 2021-07-13 PROCEDURE — 77062 BREAST TOMOSYNTHESIS BI: CPT | Performed by: PHYSICIAN ASSISTANT

## 2021-08-20 ENCOUNTER — OFFICE VISIT (OUTPATIENT)
Dept: FAMILY MEDICINE CLINIC | Facility: CLINIC | Age: 61
End: 2021-08-20
Payer: COMMERCIAL

## 2021-08-20 VITALS
WEIGHT: 167 LBS | RESPIRATION RATE: 20 BRPM | OXYGEN SATURATION: 99 % | SYSTOLIC BLOOD PRESSURE: 118 MMHG | TEMPERATURE: 98 F | HEART RATE: 76 BPM | DIASTOLIC BLOOD PRESSURE: 70 MMHG | BODY MASS INDEX: 26.21 KG/M2 | HEIGHT: 67 IN

## 2021-08-20 DIAGNOSIS — N63.20 LEFT BREAST MASS: Primary | ICD-10-CM

## 2021-08-20 PROCEDURE — 3074F SYST BP LT 130 MM HG: CPT | Performed by: PHYSICIAN ASSISTANT

## 2021-08-20 PROCEDURE — 3008F BODY MASS INDEX DOCD: CPT | Performed by: PHYSICIAN ASSISTANT

## 2021-08-20 PROCEDURE — 3078F DIAST BP <80 MM HG: CPT | Performed by: PHYSICIAN ASSISTANT

## 2021-08-20 PROCEDURE — 99213 OFFICE O/P EST LOW 20 MIN: CPT | Performed by: PHYSICIAN ASSISTANT

## 2021-08-20 NOTE — PROGRESS NOTES
HPI/Subjective:   Patient ID: Malorie Mcdonald is a 56year old female. HPI   Patient presents today complaining of the left breast mass that she noticed 2 weeks ago.   After she initially felt the mass she thinks it disappeared but then recently came back a

## 2021-08-23 ENCOUNTER — HOSPITAL ENCOUNTER (OUTPATIENT)
Dept: MAMMOGRAPHY | Facility: HOSPITAL | Age: 61
Discharge: HOME | End: 2021-08-23
Attending: PHYSICIAN ASSISTANT
Payer: COMMERCIAL

## 2021-08-23 ENCOUNTER — HOSPITAL ENCOUNTER (OUTPATIENT)
Dept: ULTRASOUND IMAGING | Facility: HOSPITAL | Age: 61
Discharge: HOME | End: 2021-08-23
Attending: PHYSICIAN ASSISTANT
Payer: COMMERCIAL

## 2021-08-23 DIAGNOSIS — N63.20 LEFT BREAST MASS: ICD-10-CM

## 2021-08-23 PROCEDURE — 76642 ULTRASOUND BREAST LIMITED: CPT | Performed by: PHYSICIAN ASSISTANT

## 2021-08-23 PROCEDURE — 77061 BREAST TOMOSYNTHESIS UNI: CPT | Performed by: PHYSICIAN ASSISTANT

## 2021-08-23 PROCEDURE — 77065 DX MAMMO INCL CAD UNI: CPT | Performed by: PHYSICIAN ASSISTANT

## 2021-10-13 ENCOUNTER — TELEPHONE (OUTPATIENT)
Dept: GASTROENTEROLOGY | Facility: CLINIC | Age: 61
End: 2021-10-13

## 2021-10-13 NOTE — TELEPHONE ENCOUNTER
Returned patients call. Relayed below per MD recommendations. Assured at next visit with her pcp for annual, she will make sure she obtains vaccine. Appreciative for follow up.

## 2021-10-13 NOTE — TELEPHONE ENCOUNTER
Colonoscopy recall for 10 years placed in TE dated 03/24/2021. Contacted Mikayla to relay hepatitis panel final results negative. MD recommending vaccination for hepatitis through her pcp. Left message to call back.      Will await call back and/or fol

## 2022-02-04 ENCOUNTER — HOSPITAL ENCOUNTER (OUTPATIENT)
Dept: ULTRASOUND IMAGING | Facility: HOSPITAL | Age: 62
Discharge: HOME | End: 2022-02-04
Attending: PHYSICIAN ASSISTANT
Payer: COMMERCIAL

## 2022-02-04 DIAGNOSIS — N63.20 LEFT BREAST MASS: ICD-10-CM

## 2022-02-04 PROCEDURE — 76642 ULTRASOUND BREAST LIMITED: CPT | Performed by: PHYSICIAN ASSISTANT

## 2022-02-18 ENCOUNTER — LAB ENCOUNTER (OUTPATIENT)
Dept: LAB | Age: 62
End: 2022-02-18
Attending: PHYSICIAN ASSISTANT
Payer: COMMERCIAL

## 2022-02-18 ENCOUNTER — OFFICE VISIT (OUTPATIENT)
Dept: FAMILY MEDICINE CLINIC | Facility: CLINIC | Age: 62
End: 2022-02-18
Payer: COMMERCIAL

## 2022-02-18 VITALS — BODY MASS INDEX: 27.15 KG/M2 | HEIGHT: 67 IN | WEIGHT: 173 LBS

## 2022-02-18 DIAGNOSIS — Z00.00 ROUTINE GENERAL MEDICAL EXAMINATION AT A HEALTH CARE FACILITY: ICD-10-CM

## 2022-02-18 DIAGNOSIS — N63.21 MASS OF UPPER OUTER QUADRANT OF LEFT BREAST: ICD-10-CM

## 2022-02-18 DIAGNOSIS — Z12.4 CERVICAL CANCER SCREENING: ICD-10-CM

## 2022-02-18 DIAGNOSIS — Z23 NEED FOR VACCINATION: ICD-10-CM

## 2022-02-18 DIAGNOSIS — R92.2 DENSE BREASTS: ICD-10-CM

## 2022-02-18 DIAGNOSIS — Z00.00 ROUTINE GENERAL MEDICAL EXAMINATION AT A HEALTH CARE FACILITY: Primary | ICD-10-CM

## 2022-02-18 LAB
ALBUMIN SERPL-MCNC: 3.7 G/DL (ref 3.4–5)
ALBUMIN/GLOB SERPL: 1.3 {RATIO} (ref 1–2)
ALT SERPL-CCNC: 24 U/L
ANION GAP SERPL CALC-SCNC: 4 MMOL/L (ref 0–18)
BASOPHILS # BLD AUTO: 0.08 X10(3) UL (ref 0–0.2)
BASOPHILS NFR BLD AUTO: 1.2 %
BILIRUB SERPL-MCNC: 0.4 MG/DL (ref 0.1–2)
BUN BLD-MCNC: 20 MG/DL (ref 7–18)
CALCIUM BLD-MCNC: 9.8 MG/DL (ref 8.5–10.1)
CHLORIDE SERPL-SCNC: 112 MMOL/L (ref 98–112)
CHOLEST SERPL-MCNC: 202 MG/DL (ref ?–200)
CO2 SERPL-SCNC: 28 MMOL/L (ref 21–32)
EOSINOPHIL # BLD AUTO: 0.29 X10(3) UL (ref 0–0.7)
EOSINOPHIL NFR BLD AUTO: 4.3 %
ERYTHROCYTE [DISTWIDTH] IN BLOOD BY AUTOMATED COUNT: 12.9 %
FASTING PATIENT LIPID ANSWER: YES
FASTING STATUS PATIENT QL REPORTED: YES
GLOBULIN PLAS-MCNC: 2.9 G/DL (ref 2.8–4.4)
GLUCOSE BLD-MCNC: 100 MG/DL (ref 70–99)
HCT VFR BLD AUTO: 44.9 %
HGB BLD-MCNC: 14.7 G/DL
IMM GRANULOCYTES # BLD AUTO: 0.05 X10(3) UL (ref 0–1)
IMM GRANULOCYTES NFR BLD: 0.7 %
LDLC SERPL CALC-MCNC: 138 MG/DL (ref ?–100)
LYMPHOCYTES # BLD AUTO: 2.23 X10(3) UL (ref 1–4)
LYMPHOCYTES NFR BLD AUTO: 32.9 %
MCH RBC QN AUTO: 31 PG (ref 26–34)
MCHC RBC AUTO-ENTMCNC: 32.7 G/DL (ref 31–37)
MCV RBC AUTO: 94.7 FL
MONOCYTES # BLD AUTO: 0.46 X10(3) UL (ref 0.1–1)
MONOCYTES NFR BLD AUTO: 6.8 %
NEUTROPHILS # BLD AUTO: 3.66 X10 (3) UL (ref 1.5–7.7)
NEUTROPHILS # BLD AUTO: 3.66 X10(3) UL (ref 1.5–7.7)
NEUTROPHILS NFR BLD AUTO: 54.1 %
NONHDLC SERPL-MCNC: 156 MG/DL (ref ?–130)
OSMOLALITY SERPL CALC.SUM OF ELEC: 301 MOSM/KG (ref 275–295)
PLATELET # BLD AUTO: 174 10(3)UL (ref 150–450)
POTASSIUM SERPL-SCNC: 4.3 MMOL/L (ref 3.5–5.1)
PROT SERPL-MCNC: 6.6 G/DL (ref 6.4–8.2)
RBC # BLD AUTO: 4.74 X10(6)UL
SODIUM SERPL-SCNC: 144 MMOL/L (ref 136–145)
TRIGL SERPL-MCNC: 99 MG/DL (ref 30–149)
TSI SER-ACNC: 3.36 MIU/ML (ref 0.36–3.74)
VIT B12 SERPL-MCNC: 533 PG/ML (ref 193–986)
VIT D+METAB SERPL-MCNC: 37.5 NG/ML (ref 30–100)
VLDLC SERPL CALC-MCNC: 18 MG/DL (ref 0–30)
WBC # BLD AUTO: 6.8 X10(3) UL (ref 4–11)

## 2022-02-18 PROCEDURE — 99396 PREV VISIT EST AGE 40-64: CPT | Performed by: PHYSICIAN ASSISTANT

## 2022-02-18 PROCEDURE — 3008F BODY MASS INDEX DOCD: CPT | Performed by: PHYSICIAN ASSISTANT

## 2022-02-18 PROCEDURE — 80061 LIPID PANEL: CPT

## 2022-02-18 PROCEDURE — 87624 HPV HI-RISK TYP POOLED RSLT: CPT | Performed by: PHYSICIAN ASSISTANT

## 2022-02-18 PROCEDURE — 88175 CYTOPATH C/V AUTO FLUID REDO: CPT | Performed by: PHYSICIAN ASSISTANT

## 2022-02-18 PROCEDURE — 82306 VITAMIN D 25 HYDROXY: CPT

## 2022-02-18 PROCEDURE — 84443 ASSAY THYROID STIM HORMONE: CPT

## 2022-02-18 PROCEDURE — 85025 COMPLETE CBC W/AUTO DIFF WBC: CPT

## 2022-02-18 PROCEDURE — 36415 COLL VENOUS BLD VENIPUNCTURE: CPT

## 2022-02-18 PROCEDURE — 80053 COMPREHEN METABOLIC PANEL: CPT

## 2022-02-18 PROCEDURE — 82607 VITAMIN B-12: CPT

## 2022-02-18 PROCEDURE — 90471 IMMUNIZATION ADMIN: CPT | Performed by: PHYSICIAN ASSISTANT

## 2022-02-18 PROCEDURE — 90750 HZV VACC RECOMBINANT IM: CPT | Performed by: PHYSICIAN ASSISTANT

## 2022-02-21 LAB — HPV I/H RISK 1 DNA SPEC QL NAA+PROBE: NEGATIVE

## 2022-07-14 ENCOUNTER — PATIENT MESSAGE (OUTPATIENT)
Dept: FAMILY MEDICINE CLINIC | Facility: CLINIC | Age: 62
End: 2022-07-14

## 2022-07-14 NOTE — TELEPHONE ENCOUNTER
From: Aida Gil  To: Michael Pardo PA-C  Sent: 7/14/2022 10:16 AM CDT  Subject: Request for Mammogram Orders    Dear Emy Gamboa I received 2 reminders for mammogram follow-ups. One is for the left breast derma lump (last exam 2/4/22) and the other is for an annual mammogram screening (last exam 7/13/21). Can these be on the same order or do they need to be separate orders? Can you send those to me? Thank you! Make it a great day.   Handy Thornton

## 2022-08-18 ENCOUNTER — HOSPITAL ENCOUNTER (OUTPATIENT)
Dept: ULTRASOUND IMAGING | Facility: HOSPITAL | Age: 62
Discharge: HOME | End: 2022-08-18
Attending: PHYSICIAN ASSISTANT
Payer: COMMERCIAL

## 2022-08-18 ENCOUNTER — HOSPITAL ENCOUNTER (OUTPATIENT)
Dept: MAMMOGRAPHY | Facility: HOSPITAL | Age: 62
Discharge: HOME | End: 2022-08-18
Attending: PHYSICIAN ASSISTANT
Payer: COMMERCIAL

## 2022-08-18 DIAGNOSIS — N63.21 MASS OF UPPER OUTER QUADRANT OF LEFT BREAST: ICD-10-CM

## 2022-08-18 DIAGNOSIS — R92.2 DENSE BREASTS: ICD-10-CM

## 2022-08-18 PROCEDURE — 77066 DX MAMMO INCL CAD BI: CPT | Performed by: PHYSICIAN ASSISTANT

## 2022-08-18 PROCEDURE — 76642 ULTRASOUND BREAST LIMITED: CPT | Performed by: PHYSICIAN ASSISTANT

## 2022-08-18 PROCEDURE — 77062 BREAST TOMOSYNTHESIS BI: CPT | Performed by: PHYSICIAN ASSISTANT

## 2022-10-21 ENCOUNTER — OFFICE VISIT (OUTPATIENT)
Dept: FAMILY MEDICINE CLINIC | Facility: CLINIC | Age: 62
End: 2022-10-21
Payer: COMMERCIAL

## 2022-10-21 VITALS
BODY MASS INDEX: 26.53 KG/M2 | HEART RATE: 71 BPM | SYSTOLIC BLOOD PRESSURE: 113 MMHG | DIASTOLIC BLOOD PRESSURE: 73 MMHG | TEMPERATURE: 98 F | HEIGHT: 67 IN | RESPIRATION RATE: 20 BRPM | WEIGHT: 169 LBS | OXYGEN SATURATION: 97 %

## 2022-10-21 DIAGNOSIS — U07.1 COVID-19 VIRUS INFECTION: Primary | ICD-10-CM

## 2022-10-21 LAB
OPERATOR ID: ABNORMAL
POCT LOT NUMBER: ABNORMAL
RAPID SARS-COV-2 BY PCR: DETECTED

## 2022-10-21 PROCEDURE — 99213 OFFICE O/P EST LOW 20 MIN: CPT | Performed by: NURSE PRACTITIONER

## 2022-10-21 PROCEDURE — 3078F DIAST BP <80 MM HG: CPT | Performed by: NURSE PRACTITIONER

## 2022-10-21 PROCEDURE — 3008F BODY MASS INDEX DOCD: CPT | Performed by: NURSE PRACTITIONER

## 2022-10-21 PROCEDURE — 3074F SYST BP LT 130 MM HG: CPT | Performed by: NURSE PRACTITIONER

## 2022-10-21 PROCEDURE — U0002 COVID-19 LAB TEST NON-CDC: HCPCS | Performed by: NURSE PRACTITIONER

## 2022-10-24 ENCOUNTER — HOSPITAL ENCOUNTER (OUTPATIENT)
Age: 62
Discharge: HOME | End: 2022-10-24
Payer: COMMERCIAL

## 2022-10-24 VITALS
TEMPERATURE: 98 F | DIASTOLIC BLOOD PRESSURE: 73 MMHG | OXYGEN SATURATION: 100 % | RESPIRATION RATE: 12 BRPM | SYSTOLIC BLOOD PRESSURE: 135 MMHG | HEART RATE: 69 BPM

## 2022-10-24 DIAGNOSIS — U07.1 COVID-19: Primary | ICD-10-CM

## 2022-10-24 LAB — S PYO AG THROAT QL: NEGATIVE

## 2022-10-24 PROCEDURE — 99213 OFFICE O/P EST LOW 20 MIN: CPT | Performed by: NURSE PRACTITIONER

## 2022-10-24 PROCEDURE — 87880 STREP A ASSAY W/OPTIC: CPT | Performed by: NURSE PRACTITIONER

## 2022-10-24 NOTE — ED NOTES
Spoke with Grupo Kirk from [employer] - states she needed to know date of symptom onset and if patient has any renal issues. Notified of 10/21 and no renal issues according to chart.

## 2023-02-20 ENCOUNTER — OFFICE VISIT (OUTPATIENT)
Dept: FAMILY MEDICINE CLINIC | Facility: CLINIC | Age: 63
End: 2023-02-20
Payer: COMMERCIAL

## 2023-02-20 ENCOUNTER — LAB ENCOUNTER (OUTPATIENT)
Dept: LAB | Age: 63
End: 2023-02-20
Attending: PHYSICIAN ASSISTANT
Payer: COMMERCIAL

## 2023-02-20 VITALS
HEIGHT: 67 IN | SYSTOLIC BLOOD PRESSURE: 122 MMHG | DIASTOLIC BLOOD PRESSURE: 78 MMHG | HEART RATE: 68 BPM | RESPIRATION RATE: 16 BRPM | BODY MASS INDEX: 27.31 KG/M2 | WEIGHT: 174 LBS

## 2023-02-20 DIAGNOSIS — Z00.00 ROUTINE GENERAL MEDICAL EXAMINATION AT A HEALTH CARE FACILITY: ICD-10-CM

## 2023-02-20 DIAGNOSIS — Z00.00 ROUTINE GENERAL MEDICAL EXAMINATION AT A HEALTH CARE FACILITY: Primary | ICD-10-CM

## 2023-02-20 DIAGNOSIS — R92.2 DENSE BREASTS: ICD-10-CM

## 2023-02-20 DIAGNOSIS — Z23 NEED FOR VACCINATION: ICD-10-CM

## 2023-02-20 DIAGNOSIS — Z12.31 VISIT FOR SCREENING MAMMOGRAM: ICD-10-CM

## 2023-02-20 LAB
ALBUMIN SERPL-MCNC: 3.9 G/DL (ref 3.4–5)
ALBUMIN/GLOB SERPL: 1.3 {RATIO} (ref 1–2)
ALP LIVER SERPL-CCNC: 41 U/L
ALT SERPL-CCNC: 27 U/L
ANION GAP SERPL CALC-SCNC: 2 MMOL/L (ref 0–18)
AST SERPL-CCNC: 15 U/L (ref 15–37)
BASOPHILS # BLD AUTO: 0.11 X10(3) UL (ref 0–0.2)
BASOPHILS NFR BLD AUTO: 1.8 %
BILIRUB SERPL-MCNC: 0.3 MG/DL (ref 0.1–2)
BUN BLD-MCNC: 16 MG/DL (ref 7–18)
CALCIUM BLD-MCNC: 9.8 MG/DL (ref 8.5–10.1)
CHLORIDE SERPL-SCNC: 114 MMOL/L (ref 98–112)
CHOLEST SERPL-MCNC: 193 MG/DL (ref ?–200)
CO2 SERPL-SCNC: 27 MMOL/L (ref 21–32)
CREAT BLD-MCNC: 0.96 MG/DL
EOSINOPHIL # BLD AUTO: 0.21 X10(3) UL (ref 0–0.7)
EOSINOPHIL NFR BLD AUTO: 3.4 %
ERYTHROCYTE [DISTWIDTH] IN BLOOD BY AUTOMATED COUNT: 12.6 %
FASTING PATIENT LIPID ANSWER: YES
FASTING STATUS PATIENT QL REPORTED: YES
GFR SERPLBLD BASED ON 1.73 SQ M-ARVRAT: 66 ML/MIN/1.73M2 (ref 60–?)
GLOBULIN PLAS-MCNC: 3 G/DL (ref 2.8–4.4)
GLUCOSE BLD-MCNC: 100 MG/DL (ref 70–99)
HCT VFR BLD AUTO: 45.3 %
HDLC SERPL-MCNC: 52 MG/DL (ref 40–59)
HGB BLD-MCNC: 14.3 G/DL
IMM GRANULOCYTES # BLD AUTO: 0.03 X10(3) UL (ref 0–1)
IMM GRANULOCYTES NFR BLD: 0.5 %
LDLC SERPL CALC-MCNC: 124 MG/DL (ref ?–100)
LYMPHOCYTES # BLD AUTO: 1.96 X10(3) UL (ref 1–4)
LYMPHOCYTES NFR BLD AUTO: 32 %
MCH RBC QN AUTO: 30.7 PG (ref 26–34)
MCHC RBC AUTO-ENTMCNC: 31.6 G/DL (ref 31–37)
MCV RBC AUTO: 97.2 FL
MONOCYTES # BLD AUTO: 0.34 X10(3) UL (ref 0.1–1)
MONOCYTES NFR BLD AUTO: 5.6 %
NEUTROPHILS # BLD AUTO: 3.47 X10 (3) UL (ref 1.5–7.7)
NEUTROPHILS # BLD AUTO: 3.47 X10(3) UL (ref 1.5–7.7)
NEUTROPHILS NFR BLD AUTO: 56.7 %
NONHDLC SERPL-MCNC: 141 MG/DL (ref ?–130)
OSMOLALITY SERPL CALC.SUM OF ELEC: 297 MOSM/KG (ref 275–295)
PLATELET # BLD AUTO: 163 10(3)UL (ref 150–450)
POTASSIUM SERPL-SCNC: 4.6 MMOL/L (ref 3.5–5.1)
PROT SERPL-MCNC: 6.9 G/DL (ref 6.4–8.2)
RBC # BLD AUTO: 4.66 X10(6)UL
SODIUM SERPL-SCNC: 143 MMOL/L (ref 136–145)
TRIGL SERPL-MCNC: 93 MG/DL (ref 30–149)
TSI SER-ACNC: 3.32 MIU/ML (ref 0.36–3.74)
VIT B12 SERPL-MCNC: 543 PG/ML (ref 193–986)
VIT D+METAB SERPL-MCNC: 37.6 NG/ML (ref 30–100)
VLDLC SERPL CALC-MCNC: 16 MG/DL (ref 0–30)
WBC # BLD AUTO: 6.1 X10(3) UL (ref 4–11)

## 2023-02-20 PROCEDURE — 80053 COMPREHEN METABOLIC PANEL: CPT

## 2023-02-20 PROCEDURE — 82607 VITAMIN B-12: CPT

## 2023-02-20 PROCEDURE — 84443 ASSAY THYROID STIM HORMONE: CPT

## 2023-02-20 PROCEDURE — 82306 VITAMIN D 25 HYDROXY: CPT

## 2023-02-20 PROCEDURE — 85025 COMPLETE CBC W/AUTO DIFF WBC: CPT

## 2023-02-20 PROCEDURE — 36415 COLL VENOUS BLD VENIPUNCTURE: CPT

## 2023-02-20 PROCEDURE — 80061 LIPID PANEL: CPT

## 2023-09-01 ENCOUNTER — TELEPHONE (OUTPATIENT)
Dept: FAMILY MEDICINE CLINIC | Facility: CLINIC | Age: 63
End: 2023-09-01

## 2023-09-01 ENCOUNTER — HOSPITAL ENCOUNTER (OUTPATIENT)
Dept: MAMMOGRAPHY | Facility: HOSPITAL | Age: 63
Discharge: HOME | End: 2023-09-01
Attending: PHYSICIAN ASSISTANT
Payer: COMMERCIAL

## 2023-09-01 DIAGNOSIS — N64.89 BREAST ASYMMETRY: ICD-10-CM

## 2023-09-01 DIAGNOSIS — Z12.31 VISIT FOR SCREENING MAMMOGRAM: ICD-10-CM

## 2023-09-01 DIAGNOSIS — R92.2 DENSE BREASTS: ICD-10-CM

## 2023-09-01 DIAGNOSIS — N63.0 BREAST MASS IN FEMALE: Primary | ICD-10-CM

## 2023-10-12 ENCOUNTER — HOSPITAL ENCOUNTER (OUTPATIENT)
Dept: ULTRASOUND IMAGING | Facility: HOSPITAL | Age: 63
Discharge: HOME | End: 2023-10-12
Attending: PHYSICIAN ASSISTANT
Payer: COMMERCIAL

## 2023-10-12 ENCOUNTER — HOSPITAL ENCOUNTER (OUTPATIENT)
Dept: MAMMOGRAPHY | Facility: HOSPITAL | Age: 63
Discharge: HOME | End: 2023-10-12
Attending: PHYSICIAN ASSISTANT
Payer: COMMERCIAL

## 2023-10-12 DIAGNOSIS — N64.89 BREAST ASYMMETRY: ICD-10-CM

## 2023-10-12 DIAGNOSIS — R92.2 DENSE BREASTS: ICD-10-CM

## 2023-10-12 DIAGNOSIS — R92.30 DENSE BREASTS: ICD-10-CM

## 2023-10-12 DIAGNOSIS — N63.0 BREAST MASS IN FEMALE: ICD-10-CM

## 2023-10-12 DIAGNOSIS — Z12.31 VISIT FOR SCREENING MAMMOGRAM: ICD-10-CM

## 2023-10-12 PROCEDURE — 77062 BREAST TOMOSYNTHESIS BI: CPT | Performed by: PHYSICIAN ASSISTANT

## 2023-10-12 PROCEDURE — 77066 DX MAMMO INCL CAD BI: CPT | Performed by: PHYSICIAN ASSISTANT

## 2023-10-12 PROCEDURE — 76642 ULTRASOUND BREAST LIMITED: CPT | Performed by: PHYSICIAN ASSISTANT

## 2024-05-01 ENCOUNTER — LAB ENCOUNTER (OUTPATIENT)
Dept: LAB | Age: 64
End: 2024-05-01
Attending: PHYSICIAN ASSISTANT
Payer: COMMERCIAL

## 2024-05-01 ENCOUNTER — OFFICE VISIT (OUTPATIENT)
Dept: FAMILY MEDICINE CLINIC | Facility: CLINIC | Age: 64
End: 2024-05-01
Payer: COMMERCIAL

## 2024-05-01 VITALS
WEIGHT: 162 LBS | HEIGHT: 67 IN | HEART RATE: 50 BPM | OXYGEN SATURATION: 95 % | BODY MASS INDEX: 25.43 KG/M2 | SYSTOLIC BLOOD PRESSURE: 116 MMHG | DIASTOLIC BLOOD PRESSURE: 62 MMHG

## 2024-05-01 DIAGNOSIS — R25.2 LEG CRAMPS: ICD-10-CM

## 2024-05-01 DIAGNOSIS — R92.30 DENSE BREASTS: ICD-10-CM

## 2024-05-01 DIAGNOSIS — Z13.820 ENCOUNTER FOR OSTEOPOROSIS SCREENING IN ASYMPTOMATIC POSTMENOPAUSAL PATIENT: ICD-10-CM

## 2024-05-01 DIAGNOSIS — Z78.0 ENCOUNTER FOR OSTEOPOROSIS SCREENING IN ASYMPTOMATIC POSTMENOPAUSAL PATIENT: ICD-10-CM

## 2024-05-01 DIAGNOSIS — Z00.00 ROUTINE GENERAL MEDICAL EXAMINATION AT A HEALTH CARE FACILITY: ICD-10-CM

## 2024-05-01 DIAGNOSIS — R05.3 PERSISTENT COUGH: ICD-10-CM

## 2024-05-01 DIAGNOSIS — Z00.00 ROUTINE GENERAL MEDICAL EXAMINATION AT A HEALTH CARE FACILITY: Primary | ICD-10-CM

## 2024-05-01 DIAGNOSIS — Z80.3 FAMILY HISTORY OF BREAST CANCER IN SISTER: ICD-10-CM

## 2024-05-01 LAB
ALBUMIN SERPL-MCNC: 3.9 G/DL (ref 3.4–5)
ALBUMIN/GLOB SERPL: 1.3 {RATIO} (ref 1–2)
ALP LIVER SERPL-CCNC: 41 U/L
ALT SERPL-CCNC: 33 U/L
ANION GAP SERPL CALC-SCNC: 4 MMOL/L (ref 0–18)
AST SERPL-CCNC: 14 U/L (ref 15–37)
BASOPHILS # BLD AUTO: 0.08 X10(3) UL (ref 0–0.2)
BASOPHILS NFR BLD AUTO: 1.2 %
BILIRUB SERPL-MCNC: 0.8 MG/DL (ref 0.1–2)
BUN BLD-MCNC: 19 MG/DL (ref 9–23)
CALCIUM BLD-MCNC: 9.9 MG/DL (ref 8.5–10.1)
CHLORIDE SERPL-SCNC: 108 MMOL/L (ref 98–112)
CHOLEST SERPL-MCNC: 215 MG/DL (ref ?–200)
CO2 SERPL-SCNC: 27 MMOL/L (ref 21–32)
CREAT BLD-MCNC: 0.92 MG/DL
EGFRCR SERPLBLD CKD-EPI 2021: 69 ML/MIN/1.73M2 (ref 60–?)
EOSINOPHIL # BLD AUTO: 0.19 X10(3) UL (ref 0–0.7)
EOSINOPHIL NFR BLD AUTO: 2.8 %
ERYTHROCYTE [DISTWIDTH] IN BLOOD BY AUTOMATED COUNT: 12.4 %
FASTING PATIENT LIPID ANSWER: YES
FASTING STATUS PATIENT QL REPORTED: YES
GLOBULIN PLAS-MCNC: 3.1 G/DL (ref 2.8–4.4)
GLUCOSE BLD-MCNC: 91 MG/DL (ref 70–99)
HCT VFR BLD AUTO: 41.6 %
HDLC SERPL-MCNC: 54 MG/DL (ref 40–59)
HGB BLD-MCNC: 14.2 G/DL
IMM GRANULOCYTES # BLD AUTO: 0.01 X10(3) UL (ref 0–1)
IMM GRANULOCYTES NFR BLD: 0.1 %
LDLC SERPL CALC-MCNC: 144 MG/DL (ref ?–100)
LYMPHOCYTES # BLD AUTO: 2.3 X10(3) UL (ref 1–4)
LYMPHOCYTES NFR BLD AUTO: 33.7 %
MAGNESIUM SERPL-MCNC: 2.5 MG/DL (ref 1.6–2.6)
MCH RBC QN AUTO: 31.6 PG (ref 26–34)
MCHC RBC AUTO-ENTMCNC: 34.1 G/DL (ref 31–37)
MCV RBC AUTO: 92.4 FL
MONOCYTES # BLD AUTO: 0.37 X10(3) UL (ref 0.1–1)
MONOCYTES NFR BLD AUTO: 5.4 %
NEUTROPHILS # BLD AUTO: 3.88 X10 (3) UL (ref 1.5–7.7)
NEUTROPHILS # BLD AUTO: 3.88 X10(3) UL (ref 1.5–7.7)
NEUTROPHILS NFR BLD AUTO: 56.8 %
NONHDLC SERPL-MCNC: 161 MG/DL (ref ?–130)
OSMOLALITY SERPL CALC.SUM OF ELEC: 290 MOSM/KG (ref 275–295)
PLATELET # BLD AUTO: 159 10(3)UL (ref 150–450)
POTASSIUM SERPL-SCNC: 4.3 MMOL/L (ref 3.5–5.1)
PROT SERPL-MCNC: 7 G/DL (ref 6.4–8.2)
RBC # BLD AUTO: 4.5 X10(6)UL
SODIUM SERPL-SCNC: 139 MMOL/L (ref 136–145)
T4 FREE SERPL-MCNC: 0.8 NG/DL (ref 0.8–1.7)
TRIGL SERPL-MCNC: 93 MG/DL (ref 30–149)
TSI SER-ACNC: 2.59 MIU/ML (ref 0.36–3.74)
VIT B12 SERPL-MCNC: 892 PG/ML (ref 193–986)
VIT D+METAB SERPL-MCNC: 41.5 NG/ML (ref 30–100)
VLDLC SERPL CALC-MCNC: 17 MG/DL (ref 0–30)
WBC # BLD AUTO: 6.8 X10(3) UL (ref 4–11)

## 2024-05-01 PROCEDURE — 99397 PER PM REEVAL EST PAT 65+ YR: CPT | Performed by: PHYSICIAN ASSISTANT

## 2024-05-01 PROCEDURE — 80061 LIPID PANEL: CPT

## 2024-05-01 PROCEDURE — 84439 ASSAY OF FREE THYROXINE: CPT

## 2024-05-01 PROCEDURE — 84443 ASSAY THYROID STIM HORMONE: CPT

## 2024-05-01 PROCEDURE — 82306 VITAMIN D 25 HYDROXY: CPT

## 2024-05-01 PROCEDURE — 80053 COMPREHEN METABOLIC PANEL: CPT

## 2024-05-01 PROCEDURE — 85025 COMPLETE CBC W/AUTO DIFF WBC: CPT

## 2024-05-01 PROCEDURE — 83735 ASSAY OF MAGNESIUM: CPT

## 2024-05-01 PROCEDURE — 36415 COLL VENOUS BLD VENIPUNCTURE: CPT

## 2024-05-01 PROCEDURE — 82607 VITAMIN B-12: CPT

## 2024-05-06 ENCOUNTER — HOSPITAL ENCOUNTER (OUTPATIENT)
Dept: BONE DENSITY | Facility: HOSPITAL | Age: 64
Discharge: HOME | End: 2024-05-06
Attending: PHYSICIAN ASSISTANT
Payer: COMMERCIAL

## 2024-05-06 DIAGNOSIS — Z13.820 ENCOUNTER FOR OSTEOPOROSIS SCREENING IN ASYMPTOMATIC POSTMENOPAUSAL PATIENT: ICD-10-CM

## 2024-05-06 DIAGNOSIS — Z78.0 ENCOUNTER FOR OSTEOPOROSIS SCREENING IN ASYMPTOMATIC POSTMENOPAUSAL PATIENT: ICD-10-CM

## 2024-05-06 PROCEDURE — 77080 DXA BONE DENSITY AXIAL: CPT | Performed by: PHYSICIAN ASSISTANT

## 2024-07-02 ENCOUNTER — HOSPITAL ENCOUNTER (OUTPATIENT)
Dept: CT IMAGING | Age: 64
Discharge: HOME | End: 2024-07-02
Attending: PHYSICIAN ASSISTANT

## 2024-07-02 DIAGNOSIS — Z13.6 SCREENING FOR HEART DISEASE: ICD-10-CM

## 2024-07-02 NOTE — PROGRESS NOTES
Date of Service 7/2/2024    PRAVEEN RIVERA  Date of Birth 9/28/1960    Patient Age: 63 year old    PCP: Misha Goodson MD  [address] [place] [address]  [address] [place] [id number]    Heart Scan Consult  Preliminary Heart Scan Score: 0    Previous Screening  Heart Scan Completed Previously: Yes  Year of last heart scan: 3/26/2019  Score of last heart scan: 0  Peripheral Vascular Scan Completed Previously: Yes  Year of last PV screening: maybe 5 years ago - [facility]  Score of last PV screening: Results normal    Risk Factors  Personal Risk Factors  Non-alterable Risk Factors: Family History (Father had MI and Bypass X3 at 57 yo.)  Alterable Risk Factors: Abnormal Cholesterol      Body Mass Index  BMI 25    Blood Pressure  /62 no med.  (Normal =< 120/80,  Elevated = 120-129/ >80,  High Stage1 130-139/80-89 , Stage2 >140/>90)    Lipid Profile  Cholesterol: 215, done on 5/1/2024.  HDL Cholesterol: 54, done on 5/1/2024.  LDL Cholesterol: 144, done on 5/1/2024.  TriGlycerides 93, done on 5/1/2024.  No med.  She has a clean diet.     Cholesterol Goals  Value   Total  =< 200   HDL  = > 45 Men = > 55 Women   LDL   =< 100   Triglycerides  =< 150       Glucose and Hemoglobin A1C  Lab Results   Component Value Date    GLU 91 05/01/2024     (Normal Fasting Glucose < 100mg/dl )    Nurse Review  Risk factor information and results reviewed with Nurse: Yes    Recommended Follow Up:  Consult your physician regarding:: Final Heart Scan Report;Discuss potential for Incidental Finding      Recommendations for Change:  Nutrition Changes: Low Saturated Fat;Increase Fiber    Cholesterol Modification (goal of therapy depends upon your risk): Decrease LDL (Lousy/Bad) Ideal <100    Exercise: Enhance Current Program    Smoking Cessation: No Change Needed    Weight Management: Maintain Current Weight    Stress Management: Adopt Stress Management Techniques    Repeat Heart Scan: 5 years if Calcium Score is 0.0;Discuss with your Physician               [place] Recommended Resources:  Recommended Resources: PV Screening;Upcoming Classes, Medical Services and Health Library [email or web address]  Recommended PV Screening: Abdomen;Carotids         Marlena SERRATO, RN        Please Contact the Nurse Heart Line with any Questions or Concerns [phone].

## 2024-07-03 ENCOUNTER — PATIENT MESSAGE (OUTPATIENT)
Dept: FAMILY MEDICINE CLINIC | Facility: CLINIC | Age: 64
End: 2024-07-03

## 2024-07-03 ENCOUNTER — LAB ENCOUNTER (OUTPATIENT)
Dept: LAB | Facility: HOSPITAL | Age: 64
End: 2024-07-03
Attending: PHYSICIAN ASSISTANT
Payer: COMMERCIAL

## 2024-07-03 DIAGNOSIS — R19.5 DARK STOOLS: Primary | ICD-10-CM

## 2024-07-03 DIAGNOSIS — R19.5 DARK STOOLS: ICD-10-CM

## 2024-07-03 LAB
BASOPHILS # BLD AUTO: 0.09 X10(3) UL (ref 0–0.2)
BASOPHILS NFR BLD AUTO: 0.7 %
DEPRECATED HBV CORE AB SER IA-ACNC: 127.3 NG/ML
DEPRECATED RDW RBC AUTO: 44 FL (ref 35.1–46.3)
EOSINOPHIL # BLD AUTO: 0.08 X10(3) UL (ref 0–0.7)
EOSINOPHIL NFR BLD AUTO: 0.6 %
ERYTHROCYTE [DISTWIDTH] IN BLOOD BY AUTOMATED COUNT: 12.8 % (ref 11–15)
HCT VFR BLD AUTO: 27.7 %
HGB BLD-MCNC: 9.1 G/DL
IMM GRANULOCYTES # BLD AUTO: 0.1 X10(3) UL (ref 0–1)
IMM GRANULOCYTES NFR BLD: 0.8 %
IRON SATN MFR SERPL: 23 %
IRON SERPL-MCNC: 69 UG/DL
LYMPHOCYTES # BLD AUTO: 3.09 X10(3) UL (ref 1–4)
LYMPHOCYTES NFR BLD AUTO: 23.5 %
MCH RBC QN AUTO: 31.2 PG (ref 26–34)
MCHC RBC AUTO-ENTMCNC: 32.9 G/DL (ref 31–37)
MCV RBC AUTO: 94.9 FL
MONOCYTES # BLD AUTO: 0.63 X10(3) UL (ref 0.1–1)
MONOCYTES NFR BLD AUTO: 4.8 %
NEUTROPHILS # BLD AUTO: 9.16 X10 (3) UL (ref 1.5–7.7)
NEUTROPHILS # BLD AUTO: 9.16 X10(3) UL (ref 1.5–7.7)
NEUTROPHILS NFR BLD AUTO: 69.6 %
PLATELET # BLD AUTO: 214 10(3)UL (ref 150–450)
PLATELETS.RETICULATED NFR BLD AUTO: 17 % (ref 0–7)
RBC # BLD AUTO: 2.92 X10(6)UL
TIBC SERPL-MCNC: 297 UG/DL (ref 250–425)
TRANSFERRIN SERPL-MCNC: 199 MG/DL (ref 250–380)
WBC # BLD AUTO: 13.2 X10(3) UL (ref 4–11)

## 2024-07-03 PROCEDURE — 85025 COMPLETE CBC W/AUTO DIFF WBC: CPT

## 2024-07-03 PROCEDURE — 83540 ASSAY OF IRON: CPT

## 2024-07-03 PROCEDURE — 82728 ASSAY OF FERRITIN: CPT

## 2024-07-03 PROCEDURE — 36415 COLL VENOUS BLD VENIPUNCTURE: CPT

## 2024-07-03 PROCEDURE — 84466 ASSAY OF TRANSFERRIN: CPT

## 2024-07-03 NOTE — TELEPHONE ENCOUNTER
LM for pt to cb. If feeling listless--needs ER.    Please see msg from pt.    Am awaiting cb to triage further--if confirms below do you agree? If she is feeling ok--see here, GI? Please advise thx

## 2024-07-03 NOTE — TELEPHONE ENCOUNTER
Pt called back.See previous message concerning dark stool.  Pt does not feel this warrants an ER visit..  Pt does feel fatigue but it is not constant.Pt states it could be from heat and work.  Please advise.  Pt states no change in diet and no iron.Pt states stool looks black

## 2024-07-04 ENCOUNTER — HOSPITAL ENCOUNTER (OUTPATIENT)
Facility: HOSPITAL | Age: 64
Setting detail: OBSERVATION
LOS: 1 days | Discharge: HOME | End: 2024-07-06
Attending: EMERGENCY MEDICINE | Admitting: HOSPITALIST
Payer: COMMERCIAL

## 2024-07-04 DIAGNOSIS — D64.9 ANEMIA, UNSPECIFIED TYPE: ICD-10-CM

## 2024-07-04 DIAGNOSIS — K92.2 GASTROINTESTINAL HEMORRHAGE, UNSPECIFIED GASTROINTESTINAL HEMORRHAGE TYPE: Primary | ICD-10-CM

## 2024-07-04 PROBLEM — K92.1 MELENA: Status: ACTIVE | Noted: 2024-07-04

## 2024-07-04 LAB
ALBUMIN SERPL-MCNC: 4.1 G/DL (ref 3.2–4.8)
ALBUMIN/GLOB SERPL: 2.1 {RATIO} (ref 1–2)
ALP LIVER SERPL-CCNC: 36 U/L
ALT SERPL-CCNC: 16 U/L
ANION GAP SERPL CALC-SCNC: 7 MMOL/L (ref 0–18)
ANTIBODY SCREEN: NEGATIVE
AST SERPL-CCNC: 19 U/L (ref ?–34)
BASOPHILS # BLD AUTO: 0.08 X10(3) UL (ref 0–0.2)
BASOPHILS NFR BLD AUTO: 0.9 %
BILIRUB SERPL-MCNC: 0.4 MG/DL (ref 0.2–1.1)
BUN BLD-MCNC: 20 MG/DL (ref 9–23)
BUN/CREAT SERPL: 19.6 (ref 10–20)
CALCIUM BLD-MCNC: 9.7 MG/DL (ref 8.7–10.4)
CHLORIDE SERPL-SCNC: 109 MMOL/L (ref 98–112)
CO2 SERPL-SCNC: 26 MMOL/L (ref 21–32)
CREAT BLD-MCNC: 1.02 MG/DL
DEPRECATED RDW RBC AUTO: 45.1 FL (ref 35.1–46.3)
EGFRCR SERPLBLD CKD-EPI 2021: 61 ML/MIN/1.73M2 (ref 60–?)
EOSINOPHIL # BLD AUTO: 0.08 X10(3) UL (ref 0–0.7)
EOSINOPHIL NFR BLD AUTO: 0.9 %
ERYTHROCYTE [DISTWIDTH] IN BLOOD BY AUTOMATED COUNT: 13.2 % (ref 11–15)
GLOBULIN PLAS-MCNC: 2 G/DL (ref 2–3.5)
GLUCOSE BLD-MCNC: 139 MG/DL (ref 70–99)
HCT VFR BLD AUTO: 22.3 %
HCT VFR BLD AUTO: 25.5 %
HGB BLD-MCNC: 7.3 G/DL
HGB BLD-MCNC: 8.4 G/DL
IMM GRANULOCYTES # BLD AUTO: 0.09 X10(3) UL (ref 0–1)
IMM GRANULOCYTES NFR BLD: 1 %
LYMPHOCYTES # BLD AUTO: 2.27 X10(3) UL (ref 1–4)
LYMPHOCYTES NFR BLD AUTO: 26.2 %
MCH RBC QN AUTO: 31.9 PG (ref 26–34)
MCHC RBC AUTO-ENTMCNC: 32.9 G/DL (ref 31–37)
MCV RBC AUTO: 97 FL
MONOCYTES # BLD AUTO: 0.54 X10(3) UL (ref 0.1–1)
MONOCYTES NFR BLD AUTO: 6.2 %
NEUTROPHILS # BLD AUTO: 5.61 X10 (3) UL (ref 1.5–7.7)
NEUTROPHILS # BLD AUTO: 5.61 X10(3) UL (ref 1.5–7.7)
NEUTROPHILS NFR BLD AUTO: 64.8 %
OSMOLALITY SERPL CALC.SUM OF ELEC: 299 MOSM/KG (ref 275–295)
PLATELET # BLD AUTO: 203 10(3)UL (ref 150–450)
PLATELETS.RETICULATED NFR BLD AUTO: 13.9 % (ref 0–7)
POTASSIUM SERPL-SCNC: 4 MMOL/L (ref 3.5–5.1)
PROT SERPL-MCNC: 6.1 G/DL (ref 5.7–8.2)
RBC # BLD AUTO: 2.63 X10(6)UL
RH BLOOD TYPE: NEGATIVE
RH BLOOD TYPE: NEGATIVE
SODIUM SERPL-SCNC: 142 MMOL/L (ref 136–145)
WBC # BLD AUTO: 8.7 X10(3) UL (ref 4–11)

## 2024-07-04 PROCEDURE — 99222 1ST HOSP IP/OBS MODERATE 55: CPT | Performed by: HOSPITALIST

## 2024-07-04 RX ORDER — TEMAZEPAM 7.5 MG/1
7.5 CAPSULE ORAL NIGHTLY PRN
Status: DISCONTINUED | OUTPATIENT
Start: 2024-07-04 | End: 2024-07-06

## 2024-07-04 RX ORDER — METOCLOPRAMIDE HYDROCHLORIDE 5 MG/ML
10 INJECTION INTRAMUSCULAR; INTRAVENOUS EVERY 8 HOURS PRN
Status: DISCONTINUED | OUTPATIENT
Start: 2024-07-04 | End: 2024-07-06

## 2024-07-04 RX ORDER — ONDANSETRON 2 MG/ML
4 INJECTION INTRAMUSCULAR; INTRAVENOUS EVERY 6 HOURS PRN
Status: DISCONTINUED | OUTPATIENT
Start: 2024-07-04 | End: 2024-07-06

## 2024-07-04 RX ORDER — SODIUM CHLORIDE 9 MG/ML
INJECTION, SOLUTION INTRAVENOUS CONTINUOUS
Status: DISCONTINUED | OUTPATIENT
Start: 2024-07-04 | End: 2024-07-05

## 2024-07-04 RX ORDER — ACETAMINOPHEN 500 MG
500 TABLET ORAL EVERY 4 HOURS PRN
Status: DISCONTINUED | OUTPATIENT
Start: 2024-07-04 | End: 2024-07-06

## 2024-07-04 NOTE — H&P
[facility]    PATIENT'S NAME: PRAVEEN RIVERA   ATTENDING PHYSICIAN: Jeffrey Danielle MD   PATIENT ACCOUNT#:   [id number]    LOCATION:  [address] [place] [address]  MEDICAL RECORD #:   [id number]       YOB: 1960  ADMISSION DATE:       07/04/2024    HISTORY AND PHYSICAL EXAMINATION    CHIEF COMPLAINT:  Melena, gastrointestinal bleed.    HISTORY OF PRESENT ILLNESS:  Patient is a 63-year-old  female who came into the emergency department for evaluation of palpitations and fatigue for the last day or 2.  Patient reported that she has been seeing melenic dark bowel movements for the last 5 to 6 days.  CBC today showed hemoglobin of 8.4.  Hemoglobin yesterday was 9.1, and baseline is 14.  She received Protonix in the emergency room, and she will be admitted to the hospital for further management.     PAST MEDICAL HISTORY:  Records indicate hepatitis C in the 1970s.  In 2021, she had hepatitis profile panel which was negative.     PAST SURGICAL HISTORY:  None.    MEDICATIONS:  Currently none.  Denies any NSAIDs over-the-counter.    ALLERGIES:  No known drug allergies.    FAMILY HISTORY:  Mother had hypertension.  Father had coronary artery disease.      SOCIAL HISTORY:  No tobacco.  Occasional alcohol.  No drug use.  Independent in her basic activities of daily living.     REVIEW OF SYSTEMS:  Patient reports melenic bowel movements on a daily basis for the last 5 to 6 days.  She denies dyspepsia, nausea, or vomiting.  No epigastric pain.  Today, she felt palpitations and dyspnea on exertion.  Other 12-point review of systems is negative.       PHYSICAL EXAMINATION:    GENERAL:  Alert and oriented to time, place and person.  No acute distress.   VITAL SIGNS:  Temperature 98.1, pulse 99, respiratory rate 16, blood pressure 103/81, pulse ox 99% on room air.  HEENT:  Atraumatic.  Oropharynx clear.  Moist mucous membranes.  Normal hard and soft palate.  Eyes:  Anicteric sclerae.    NECK:  Supple.  No  lymphadenopathy.  Trachea midline.  Full range of motion.   LUNGS:  Clear to auscultation bilaterally.  Normal respiratory effort.    HEART:  Regular rate and rhythm.  S1 and S2 auscultated.  No murmur.    ABDOMEN:  Soft, nondistended.  No tenderness.  Positive bowel sounds.   EXTREMITIES:  No peripheral edema, clubbing or cyanosis.   NEUROLOGIC:  Motor and sensory intact.  Cranial nerves II to XII are intact.      ASSESSMENT:    1.   Melenic gastrointestinal bleed.  2.   Posthemorrhagic anemia, symptomatic.    PLAN:  Patient will be admitted to general medical floor.  IV Protonix.  Monitor hemodynamic status.  Repeat H and H in 6 hours.  If hemoglobin is below 7, she will need blood transfusion.  Obtain gastroenterology consult.  Further recommendations to follow.     Dictated By Jeffrey Danielle MD  d: 07/04/2024 15:39:33  t: 07/04/2024 17:28:34  [facility] [id number]  FB/

## 2024-07-04 NOTE — ED QUICK NOTES
Orders for admission, patient is aware of plan and ready to go upstairs. Any questions, please call ED RN arslan at extension [phone].     Patient Covid vaccination status: Fully vaccinated     COVID Test Ordered in ED: None    COVID Suspicion at Admission: N/A    Running Infusions:  None    Mental Status/LOC at time of transport: x4    Other pertinent information:   CIWA score: N/A   NIH score:  N/A        
Principal Discharge DX:	Acute right-sided low back pain without sciatica  Goal:	improve quality of life and pain  Assessment and plan of treatment:	improved on discharge  Medication as discussed as needed

## 2024-07-04 NOTE — ED PROVIDER NOTES
Patient Seen in: [facility] Emergency Department      History     Chief Complaint   Patient presents with    GI Bleeding           Fatigue            Stated Complaint: Dark stool, HOTN    Subjective:   63-year-old female with no active medical problems who is only on supplements states she has been feeling fatigued for 5 to 6 days.  Then she started getting rapid heartbeat on exertion and fatigue on exertion.  For the last 3 days she saw black stool despite not changing any diet.  She denies NSAID abuse.  No abdominal pain.  Last colonoscopy was in 2021.            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 Lisette Coronado     History of hepatitis    Hypothyroidism    Lipid screening    Measles    Mumps    Viral hepatitis C              Past Surgical History:   Procedure Laterality Date    Colonoscopy  02/01/2008    Dr. Lisette Coronado, Repeat colonoscopy in five years. Family history of coloncancer     Colonoscopy N/A 03/24/2021    Procedure: COLONOSCOPY;  Surgeon: Nehemias Valdez MD;  Location: [facility] ENDO    Needle biopsy right      benign     Tonsillectomy  1982                Social History     Socioeconomic History    Marital status: Single   Tobacco Use    Smoking status: Never    Smokeless tobacco: Never   Vaping Use    Vaping status: Never Used   Substance and Sexual Activity    Alcohol use: Yes     Comment: 1 drink a month    Drug use: No    Sexual activity: Never   Other Topics Concern    Caffeine Concern No    Stress Concern No    Weight Concern No    Special Diet No    Exercise Yes    Seat Belt Yes              Review of Systems    Positive for stated Chief Complaint: GI Bleeding (/) and Fatigue (/)    Other systems are as noted in HPI.  Constitutional and vital signs reviewed.      All other systems reviewed and negative except as noted  above.    Physical Exam     ED Triage Vitals [07/04/24 1451]   /81   Pulse 99   Resp 16   Temp 98.1 °F (36.7 °C)   Temp src Oral   SpO2 99 %   O2 Device None (Room air)       Current Vitals:   Vital Signs  BP: 103/81  Pulse: 99  Resp: 16  Temp: 98.1 °F (36.7 °C)  Temp src: Oral    Oxygen Therapy  SpO2: 99 %  O2 Device: None (Room air)            Physical Exam  HENT:      Head: Normocephalic.      Mouth/Throat:      Mouth: Mucous membranes are moist.      Pharynx: Oropharynx is clear.   Eyes:      Extraocular Movements: Extraocular movements intact.      Pupils: Pupils are equal, round, and reactive to light.      Comments: Conjunctival pallor   Cardiovascular:      Rate and Rhythm: Normal rate and regular rhythm.      Heart sounds: Normal heart sounds.   Pulmonary:      Effort: Pulmonary effort is normal.      Breath sounds: Normal breath sounds.   Abdominal:      Palpations: Abdomen is soft.      Tenderness: There is no abdominal tenderness.   Musculoskeletal:         General: No swelling or tenderness. Normal range of motion.      Cervical back: Normal range of motion.   Lymphadenopathy:      Cervical: No cervical adenopathy.   Skin:     General: Skin is warm.      Capillary Refill: Capillary refill takes less than 2 seconds.   Neurological:      General: No focal deficit present.      Mental Status: She is alert.               ED Course     Labs Reviewed   COMP METABOLIC PANEL (14) - Abnormal; Notable for the following components:       Result Value    Glucose 139 (*)     Calculated Osmolality 299 (*)     Alkaline Phosphatase 36 (*)     A/G Ratio 2.1 (*)     All other components within normal limits   CBC W/ DIFFERENTIAL - Abnormal; Notable for the following components:    RBC 2.63 (*)     HGB 8.4 (*)     HCT 25.5 (*)     Immature Platelet Fraction 13.9 (*)     All other components within normal limits   CBC WITH DIFFERENTIAL WITH PLATELET    Narrative:     The following orders were created for panel order  CBC With Differential With Platelet.  Procedure                               Abnormality         Status                     ---------                               -----------         ------                     CBC W/ DIFFERENTIAL[[id number]]          Abnormal            Final result                 Please view results for these tests on the individual orders.   TYPE AND SCREEN    Narrative:     The following orders were created for panel order Type and screen.  Procedure                               Abnormality         Status                     ---------                               -----------         ------                     ABORH (Blood Type)[[id number]]                               In process                 Antibody Screen[[id number]]                                  In process                   Please view results for these tests on the individual orders.   ABORH (BLOOD TYPE)   ANTIBODY SCREEN   ABORH CONFIRMATION   RAINBOW DRAW LAVENDER   RAINBOW DRAW LIGHT GREEN   RAINBOW DRAW BLUE                      MDM        Admission disposition: 7/4/2024  3:50 PM                                        Medical Decision Making  Patient with fatigue, some palpitations on exertion and also melena.  Differential is vast could include GI bleed, anemia, food related, medication related, viral and many other possibilities such as electrolyte disturbances.  Will do full workup.  Stool was heme positive so very suspicious that this is GI bleed and anemia.  Will give Protonix and fluids and speak with GI    Amount and/or Complexity of Data Reviewed  External Data Reviewed: labs.     Details: Hemoglobin yesterday was 9.1  Labs: ordered. Decision-making details documented in ED Course.  Discussion of management or test interpretation with external provider(s): Protonix and fluids ordered.  Discussed with hospitalist and gastroenterologist Dr. Donovan.    Risk  Decision regarding hospitalization.        Disposition and Plan      Clinical Impression:  1. Gastrointestinal hemorrhage, unspecified gastrointestinal hemorrhage type    2. Anemia, unspecified type         Disposition:  Admit  7/4/2024  3:50 pm    Follow-up:  No follow-up provider specified.        Medications Prescribed:  There are no discharge medications for this patient.                        Hospital Problems       Present on Admission  Date Reviewed: 5/1/2024            ICD-10-CM Noted POA    GI bleed K92.2 7/4/2024 Unknown

## 2024-07-04 NOTE — ED INITIAL ASSESSMENT (HPI)
Patient presents with fatigue since Friday morning. Patient appears flushed.  Patient notes since Monday morning having black stools.   Denies chest pain. Denies shortness of breath.

## 2024-07-05 ENCOUNTER — ANESTHESIA (OUTPATIENT)
Dept: ENDOSCOPY | Facility: HOSPITAL | Age: 64
End: 2024-07-05
Payer: COMMERCIAL

## 2024-07-05 ENCOUNTER — ANESTHESIA EVENT (OUTPATIENT)
Dept: ENDOSCOPY | Facility: HOSPITAL | Age: 64
End: 2024-07-05
Payer: COMMERCIAL

## 2024-07-05 PROBLEM — D62 ACUTE BLOOD LOSS ANEMIA: Status: ACTIVE | Noted: 2024-07-05

## 2024-07-05 LAB
ANION GAP SERPL CALC-SCNC: 4 MMOL/L (ref 0–18)
BASOPHILS # BLD AUTO: 0.05 X10(3) UL (ref 0–0.2)
BASOPHILS NFR BLD AUTO: 0.8 %
BUN BLD-MCNC: 12 MG/DL (ref 9–23)
BUN/CREAT SERPL: 13.6 (ref 10–20)
CALCIUM BLD-MCNC: 9.2 MG/DL (ref 8.7–10.4)
CHLORIDE SERPL-SCNC: 114 MMOL/L (ref 98–112)
CO2 SERPL-SCNC: 28 MMOL/L (ref 21–32)
CREAT BLD-MCNC: 0.88 MG/DL
DEPRECATED RDW RBC AUTO: 46.6 FL (ref 35.1–46.3)
EGFRCR SERPLBLD CKD-EPI 2021: 73 ML/MIN/1.73M2 (ref 60–?)
EOSINOPHIL # BLD AUTO: 0.17 X10(3) UL (ref 0–0.7)
EOSINOPHIL NFR BLD AUTO: 2.6 %
ERYTHROCYTE [DISTWIDTH] IN BLOOD BY AUTOMATED COUNT: 13.2 % (ref 11–15)
GLUCOSE BLD-MCNC: 106 MG/DL (ref 70–99)
HCT VFR BLD AUTO: 25.6 %
HGB BLD-MCNC: 8.3 G/DL
IMM GRANULOCYTES # BLD AUTO: 0.05 X10(3) UL (ref 0–1)
IMM GRANULOCYTES NFR BLD: 0.8 %
LYMPHOCYTES # BLD AUTO: 2.76 X10(3) UL (ref 1–4)
LYMPHOCYTES NFR BLD AUTO: 41.9 %
MCH RBC QN AUTO: 31.8 PG (ref 26–34)
MCHC RBC AUTO-ENTMCNC: 32.4 G/DL (ref 31–37)
MCV RBC AUTO: 98.1 FL
MONOCYTES # BLD AUTO: 0.43 X10(3) UL (ref 0.1–1)
MONOCYTES NFR BLD AUTO: 6.5 %
NEUTROPHILS # BLD AUTO: 3.13 X10 (3) UL (ref 1.5–7.7)
NEUTROPHILS # BLD AUTO: 3.13 X10(3) UL (ref 1.5–7.7)
NEUTROPHILS NFR BLD AUTO: 47.4 %
OSMOLALITY SERPL CALC.SUM OF ELEC: 302 MOSM/KG (ref 275–295)
PLATELET # BLD AUTO: 167 10(3)UL (ref 150–450)
POTASSIUM SERPL-SCNC: 3.9 MMOL/L (ref 3.5–5.1)
RBC # BLD AUTO: 2.61 X10(6)UL
SODIUM SERPL-SCNC: 146 MMOL/L (ref 136–145)
WBC # BLD AUTO: 6.6 X10(3) UL (ref 4–11)

## 2024-07-05 PROCEDURE — 0DB78ZX EXCISION OF STOMACH, PYLORUS, VIA NATURAL OR ARTIFICIAL OPENING ENDOSCOPIC, DIAGNOSTIC: ICD-10-PCS | Performed by: INTERNAL MEDICINE

## 2024-07-05 PROCEDURE — 99233 SBSQ HOSP IP/OBS HIGH 50: CPT | Performed by: STUDENT IN AN ORGANIZED HEALTH CARE EDUCATION/TRAINING PROGRAM

## 2024-07-05 PROCEDURE — 99222 1ST HOSP IP/OBS MODERATE 55: CPT | Performed by: INTERNAL MEDICINE

## 2024-07-05 PROCEDURE — 0DB68ZX EXCISION OF STOMACH, VIA NATURAL OR ARTIFICIAL OPENING ENDOSCOPIC, DIAGNOSTIC: ICD-10-PCS | Performed by: INTERNAL MEDICINE

## 2024-07-05 PROCEDURE — 43239 EGD BIOPSY SINGLE/MULTIPLE: CPT | Performed by: INTERNAL MEDICINE

## 2024-07-05 NOTE — PLAN OF CARE
Problem: Patient Centered Care  Goal: Patient preferences are identified and integrated in the patient's plan of care  Description: Interventions:  - What would you like us to know as we care for you? I live home alone  - Provide timely, complete, and accurate information to patient/family  - Incorporate patient and family knowledge, values, beliefs, and cultural backgrounds into the planning and delivery of care  - Encourage patient/family to participate in care and decision-making at the level they choose  - Honor patient and family perspectives and choices  Outcome: Progressing     Problem: Patient/Family Goals  Goal: Patient/Family Long Term Goal  Description: Patient's Long Term Goal: to figure out why I get fatigued    Interventions:  - follow md order  -labs  - See additional Care Plan goals for specific interventions  Outcome: Progressing  Goal: Patient/Family Short Term Goal  Description: Patient's Short Term Goal: go back home    Interventions:   - Monitor vital signs  - Monitor appropriate labs  - Administer medications per order  - Pain management as needed  - Follow MD orders  - Diagnostics per orders  - Discharge planning     - See additional Care Plan goals for specific interventions  Outcome: Progressing     Problem: GASTROINTESTINAL - ADULT  Goal: Maintains or returns to baseline bowel function  Description: INTERVENTIONS:  - Assess bowel function  - Maintain adequate hydration with IV or PO as ordered and tolerated  - Evaluate effectiveness of GI medications  - Encourage mobilization and activity  - Obtain nutritional consult as needed  - Establish a toileting routine/schedule  - Consider collaborating with pharmacy to review patient's medication profile  Outcome: Progressing     Problem: HEMATOLOGIC - ADULT  Goal: Maintains hematologic stability  Description: INTERVENTIONS  - Assess for signs and symptoms of bleeding or hemorrhage  - Monitor labs and vital signs for trends  - Administer  supportive blood products/factors, fluids and medications as ordered and appropriate  - Administer supportive blood products/factors as ordered and appropriate  Outcome: Progressing

## 2024-07-05 NOTE — ANESTHESIA PREPROCEDURE EVALUATION
Anesthesia PreOp Note    HPI:     Mikayla Mondragon is a 63 year old female who presents for preoperative consultation requested by: Mary Lou Donovan MD    Date of Surgery: 7/4/2024 - 7/5/2024    Procedure(s):  ESOPHAGOGASTRODUODENOSCOPY (EGD)  Indication: Melena, anemia    Relevant Problems   No relevant active problems       NPO:  Last Liquid Consumption Date: 07/04/24  Last Liquid Consumption Time: 0000  Last Solid Consumption Date: 07/04/24  Last Solid Consumption Time: 2000  Last Liquid Consumption Date: 07/04/24          History Review:  Patient Active Problem List    Diagnosis Date Noted    Acute blood loss anemia 07/05/2024    GI bleed 07/04/2024    Gastrointestinal hemorrhage, unspecified gastrointestinal hemorrhage type 07/04/2024    Anemia, unspecified type 07/04/2024    Melena 07/04/2024    Dense breasts 05/01/2024    Family history of breast cancer in sister 05/01/2024    Hemorrhoids 11/21/2007       Past Medical History:    Amenorrhea    Last period    Anxiety    Job threatened[place]fitness program    Chicken pox    Disorder of thyroid    Hemorrhoids    Dr. Lisette Coronado     History of hepatitis    Hypothyroidism    Lipid screening    Measles    Mumps    Viral hepatitis C       Past Surgical History:   Procedure Laterality Date    Colonoscopy  02/01/2008    Dr. Lisette Coronado, Repeat colonoscopy in five years. Family history of coloncancer     Colonoscopy N/A 03/24/2021    Procedure: COLONOSCOPY;  Surgeon: Nehemias Valdez MD;  Location: [facility] ENDO    Needle biopsy right      benign     Tonsillectomy  1982       No medications prior to admission.     Current Facility-Administered Medications Ordered in Epic   Medication Dose Route Frequency Provider Last Rate Last Admin    pantoprazole (Protonix) 40 mg in sodium chloride 0.9% PF 10 mL IV push  40 mg Intravenous Q12H Catalina Greenfield APRN        sodium chloride 0.9% infusion   Intravenous Continuous Jeffrey Danielle  mL/hr at 07/05/24  0529 New Bag at 07/05/24 0529    acetaminophen (Tylenol Extra Strength) tab 500 mg  500 mg Oral Q4H PRN Jeffrey Danielle MD        ondansetron (Zofran) 4 MG/2ML injection 4 mg  4 mg Intravenous Q6H PRN Jeffrey Danielle MD        metoclopramide (Reglan) 5 mg/mL injection 10 mg  10 mg Intravenous Q8H PRN Jeffrey Danielle MD        temazepam (Restoril) cap 7.5 mg  7.5 mg Oral Nightly PRN Elizabeth Santana MD   7.5 mg at 07/04/24 8144     No current [facility]-ordered outpatient medications on file.       No Known Allergies    Family History   Problem Relation Age of Onset    Heart Disease Father         Coronary Artery Disease     Heart Surgery Father         Coronary artery bypass grafting     Heart Disorder Father         Triple bypass 1983, no complications since    Lipids Father         Hyperlipidemia     Dementia Father     Hypertension Mother     Psychiatric Mother         Severe anxiety 1920's, Shock therapy 1962    Other (Pschyologicals problems) Mother     Depression Mother     Stroke Other         Grandmother     Colon Cancer Other         Family history of     Cancer Paternal Grandfather         Brain cancer 1959    Cancer Paternal Grandmother         Colon cancer 1993    Breast Cancer Sister 57     Social History     Socioeconomic History    Marital status: Single   Tobacco Use    Smoking status: Never    Smokeless tobacco: Never   Vaping Use    Vaping status: Never Used   Substance and Sexual Activity    Alcohol use: Yes     Comment: 1 drink a month    Drug use: No    Sexual activity: Never   Other Topics Concern    Caffeine Concern No    Stress Concern No    Weight Concern No    Special Diet No    Exercise Yes    Seat Belt Yes       Available pre-op labs reviewed.  Lab Results   Component Value Date    WBC 6.6 07/05/2024    RBC 2.61 (L) 07/05/2024    HGB 8.3 (L) 07/05/2024    HCT 25.6 (L) 07/05/2024    MCV 98.1 07/05/2024    MCH 31.8 07/05/2024    MCHC 32.4 07/05/2024    RDW 13.2 07/05/2024    .0  07/05/2024     Lab Results   Component Value Date     (H) 07/05/2024    K 3.9 07/05/2024     (H) 07/05/2024    CO2 28.0 07/05/2024    BUN 12 07/05/2024    CREATSERUM 0.88 07/05/2024     (H) 07/05/2024    CA 9.2 07/05/2024          Vital Signs:  Body mass index is 23.96 kg/m².   height is 1.702 m (5' 7\") and weight is 69.4 kg (153 lb). Her oral temperature is 98.8 °F (37.1 °C). Her blood pressure is 110/62 and her pulse is 60. Her respiration is 16 and oxygen saturation is 100%.   Vitals:    07/04/24 2100 07/05/24 0528 07/05/24 0922 07/05/24 1352   BP: 95/58 104/59 106/68 110/62   Pulse: 64 67 61 60   Resp: 16 16 18 16   Temp: 98.4 °F (36.9 °C)  98.8 °F (37.1 °C)    TempSrc: Oral  Oral    SpO2: 98% 100% 100% 100%   Weight:    69.4 kg (153 lb)   Height:    1.702 m (5' 7\")        Anesthesia Evaluation     Patient summary reviewed and Nursing notes reviewed    Airway   Mallampati: II  Dental      Pulmonary - negative ROS   Cardiovascular - negative ROS    Neuro/Psych    (+)  anxiety/panic attacks,        GI/Hepatic/Renal    (+) hepatitis C, liver disease    Endo/Other - negative ROS   Abdominal                  Anesthesia Plan:   ASA:  3  Plan:   General and MAC  Informed Consent Plan and Risks Discussed With:  Patient  Discussed plan with:  CRNA and surgeon      I have informed Mikayla Mondragon and/or legal guardian or family member of the nature of the anesthetic plan, benefits, risks including possible dental damage if relevant, major complications, and any alternative forms of anesthetic management.   All of the patient's questions were answered to the best of my ability. The patient desires the anesthetic management as planned.  SHAWN GAYLE CRNA  7/5/2024 2:20 PM  Present on Admission:  **None**

## 2024-07-05 NOTE — OPERATIVE REPORT
ESOPHAGOGASTRODUODENOSCOPY (EGD) REPORT    Mikayla Mondragon     1960 Age 63 year old   PCP Misha Goodson MD Endoscopist Mary Lou Donovan MD     Date of procedure: 24    Procedure: EGD w/ cold biopsy    Pre-operative diagnosis: melena, blood loss anemia    Post-operative diagnosis: gastric ulcer, gastritis     Medications: MAC    Complications: none    Procedure:  Informed consent was obtained from the patient after the risks of the procedure were discussed, including but not limited to bleeding, perforation, aspiration, infection, or possibility of a missed lesion. After discussions of the risks/benefits and alternatives to this procedure, as well as the planned sedation, the patient was placed in the left lateral decubitus position and begun on continuous blood pressure pulse oximetry and EKG monitoring and this was maintained throughout the procedure. Once an adequate level of sedation was obtained a bite block was placed. Then the lubricated tip of the [id number] diagnostic video upper endoscope was inserted and advanced using direct visualization into the posterior pharynx and ultimately into the esophagus.    Complications: None    Findings:      1. Esophagus: The squamocolumnar junction was noted at 39 cm and appeared normal. The diaphragmatic pinch was noted noted at 39 cm from the incisors. The esophageal mucosa appeared normal. There was no endoscopic findings of esophagitis, stricture or Horvath's esophagus.    2. Stomach: The stomach distended normally. Normal rugal folds were seen. The pylorus was patent. There was mild, patchy erythema in the stomach diffusely and one 5 mm clean based, Cristóbal Class III ulcer at the lesser curvature/incisura. Cold forceps biopsies were taken of the antrum, incisura, and body and of the ulcer. Retroflexion revealed a normal fundus and a non-patulous cardia.     3. Duodenum: The duodenal mucosa appeared normal in the 1st and 2nd portion of the duodenum.      Impression:  1. One small clean based gastric ulcer. Biopsied. This is the likely source of melena.   2. Mild, patchy gastritis. Biopsied.     Recommend:  1. Await pathology.  2. Continue pantoprazole 40 mg every 12 hours.   3. Repeat EGD is recommended in 8-12 weeks to ensure healing.    4. Avoid NSAIDs, including Motrin, Aleve, ibuprofen, Naproxen, etc..  5. Take iron supplementation every other day with citrus.  6. Take metamucil or miralax to keep the stools soft and regular.  7. OK to discharge from GI standpoint.     >>>If tissue was sampled/removed and you have not received your pathology results either by phone or letter within 2 weeks, please call our office at [phone].    Specimens: random gastric, gastric ulcer    Blood loss: <1 ml

## 2024-07-05 NOTE — PLAN OF CARE
No acute changes overnight. PRN sleeping aid given per md order. Pt NPO. Safety precautions in place, call light within reach.  Problem: Patient Centered Care    Goal: Patient preferences are identified and integrated in the patient's plan of care  Description: Interventions:  - What would you like us to know as we care for you? I live home alone  - Provide timely, complete, and accurate information to patient/family  - Incorporate patient and family knowledge, values, beliefs, and cultural backgrounds into the planning and delivery of care  - Encourage patient/family to participate in care and decision-making at the level they choose  - Honor patient and family perspectives and choices  Outcome: Progressing     Problem: Patient/Family Goals  Goal: Patient/Family Long Term Goal  Description: Patient's Long Term Goal: to figure out why I get fatigued    Interventions:  - follow md order  -labs  - See additional Care Plan goals for specific interventions  Outcome: Progressing  Goal: Patient/Family Short Term Goal  Description: Patient's Short Term Goal: go back home    Interventions:   - Monitor vital signs  - Monitor appropriate labs  - Administer medications per order  - Pain management as needed  - Follow MD orders  - Diagnostics per orders  - Discharge planning     - See additional Care Plan goals for specific interventions  Outcome: Progressing     Problem: GASTROINTESTINAL - ADULT  Goal: Maintains or returns to baseline bowel function  Description: INTERVENTIONS:  - Assess bowel function  - Maintain adequate hydration with IV or PO as ordered and tolerated  - Evaluate effectiveness of GI medications  - Encourage mobilization and activity  - Obtain nutritional consult as needed  - Establish a toileting routine/schedule  - Consider collaborating with pharmacy to review patient's medication profile  Outcome: Progressing     Problem: HEMATOLOGIC - ADULT  Goal: Maintains hematologic stability  Description:  INTERVENTIONS  - Assess for signs and symptoms of bleeding or hemorrhage  - Monitor labs and vital signs for trends  - Administer supportive blood products/factors, fluids and medications as ordered and appropriate  - Administer supportive blood products/factors as ordered and appropriate  Outcome: Progressing

## 2024-07-05 NOTE — ANESTHESIA POSTPROCEDURE EVALUATION
Patient: Mikayla Mondragon    Procedure Summary       Date: 07/05/24 Room / Location: [facility] ENDOSCOPY 01 / EM ENDOSCOPY    Anesthesia Start: 1722 Anesthesia Stop: 1745    Procedure: ESOPHAGOGASTRODUODENOSCOPY (EGD) Diagnosis: (gastric ulcer)    Surgeons: Mary Lou Donovan MD Anesthesiologist: Jenna Angel CRNA    Anesthesia Type: general, MAC ASA Status: 3            Anesthesia Type: general, MAC    Vitals Value Taken Time   BP 95/63 07/05/24 1745   Temp 98 07/05/24 1745   Pulse 77 07/05/24 1745   Resp 16 07/05/24 1745   SpO2 99 07/05/24 1745       [facility] AN Post Evaluation:   Patient Evaluated in PACU  Patient Participation: complete - patient participated  Level of Consciousness: awake  Pain Score: 0  Pain Management: adequate  Airway Patency:patent  Yes    Cardiovascular Status: acceptable  Respiratory Status: acceptable  Postoperative Hydration acceptable      Jenna Angel CRNA  7/5/2024 5:45 PM

## 2024-07-05 NOTE — H&P
The above referenced H&P was reviewed by Mary Lou Donovan MD on 7/5/2024, the patient was examined and no significant changes have occurred in the patient's condition since the H&P was performed.  I discussed with the patient and/or legal representative the potential benefits, risks and side effects of this procedure; the likelihood of the patient achieving goals; and potential problems that might occur during recuperation.  I discussed reasonable alternatives to the procedure, including risks, benefits and side effects related to the alternatives and risks related to not receiving this procedure.  We will proceed with procedure as planned.

## 2024-07-05 NOTE — CONSULTS
Is this a shared or split note between Advanced Practice Provider and Physician? Yes      [facility]   Gastroenterology Consultation Note    Mikayla Mondragon  Patient Status:    Inpatient  Date of Admission:         7/4/2024, Hospital day #1  Attending:   Deidre Vasquez MD  PCP:     Misha Goodson MD    Reason for Consultation:  Melena    History of Present Illness:  Mikayla Mondragon is a 63 year old female w/ PMHx of BMI 23.96, hx of hepatitis C (1970's, s/p treatment with last hepatitis panel negative), who presented to the ED with palpitations and fatigue with dark stools for the past 5-6 days.    Pt states that she had been in her usual state of health until last weekend when she started to notice progressively worsening SOB with exertion along with palpitations and night sweats associated with black stools that started this past Monday.  Her last brown stool was Friday with no BM over the weekend.  She denies ABD pain, N/V, dyspepsia, difficult/painful swallowing, constipation/diarrhea, maroon/bloody stools, fever, chills, chest pain and unintentional weight loss.  She reports she does not use NSAIDs or blood thinners.      Pertinent Family Hx:  - No known history of esophageal, gastric CA. Paternal grandmother with colon CA Dx late 80's.  - No known IBD  - No known liver pathologies    Endoscopy Hx:  -No prior EGD    - Colonoscopy 3/2021:  Findings:   1. NO polyp(s) noted   2. Diverticulosis: left sided.  3. Terminal ileum: the visualized mucosa appeared normal.  4. A retroflexed view of the rectum revealed hemorrhoids.  5. The colonic mucosa throughout the colon showed normal vascular pattern, without evidence of angioectasias or inflammation.   6. DOUGLAS: normal rectal tone, no masses palpated.     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 Lisette Coronado     History of hepatitis    Hypothyroidism    Lipid screening    Measles    Mumps    Viral hepatitis C     Past Surgical History:   Procedure Laterality Date    Colonoscopy  02/01/2008    Dr. Lisette Coronado, Repeat colonoscopy in five years. Family history of coloncancer     Colonoscopy N/A 03/24/2021    Procedure: COLONOSCOPY;  Surgeon: Nehemias Valdez MD;  Location: [facility] ENDO    Needle biopsy right      benign     Tonsillectomy  1982     Family History   Problem Relation Age of Onset    Heart Disease Father         Coronary Artery Disease     Heart Surgery Father         Coronary artery bypass grafting     Heart Disorder Father         Triple bypass 1983, no complications since    Lipids Father         Hyperlipidemia     Dementia Father     Hypertension Mother     Psychiatric Mother         Severe anxiety 1920's, Shock therapy 1962    Other (Pschyologicals problems) Mother     Depression Mother     Stroke Other         Grandmother     Colon Cancer Other         Family history of     Cancer Paternal Grandfather         Brain cancer 1959    Cancer Paternal Grandmother         Colon cancer 1993    Breast Cancer Sister 57      reports that she has never smoked. She has never used smokeless tobacco. She reports current alcohol use. She reports that she does not use drugs.    Allergies:  No Known Allergies    Medications:    Current Facility-Administered Medications:     sodium chloride 0.9% infusion, , Intravenous, Continuous    acetaminophen (Tylenol Extra Strength) tab 500 mg, 500 mg, Oral, Q4H PRN    ondansetron (Zofran) 4 MG/2ML injection 4 mg, 4 mg, Intravenous, Q6H PRN    metoclopramide (Reglan) 5 mg/mL injection 10 mg, 10 mg, Intravenous, Q8H PRN    pantoprazole (Protonix) 40 mg in sodium chloride 0.9% PF 10 mL IV push, 40 mg, Intravenous, Daily    temazepam (Restoril) cap 7.5 mg, 7.5  mg, Oral, Nightly PRN    Review of Systems:   CONSTITUTIONAL:  negative for fevers, chills, unintentional weight loss   EYES:  negative for diplopia or change in vision   RESPIRATORY:  negative for severe shortness of breath  CARDIOVASCULAR:  negative for crushing sub-sternal chest pain  GASTROINTESTINAL:  see HPI  GENITOURINARY:  negative for dysuria or gross hematuria  INTEGUMENT/BREAST:  SKIN:  negative for jaundice or new rash   ALLERGIC/IMMUNOLOGIC:  negative for hay fever   ENDOCRINE:  negative for cold intolerance and heat intolerance  MUSCULOSKELETAL:  negative for joint effusion/severe erythema  NEURO: negative for new loss of consciousness or dizziness   BEHAVIOR/PSYCH:  negative for psychotic behavior    Physical Exam:    Blood pressure 106/68, pulse 61, temperature 98.8 °F (37.1 °C), temperature source Oral, resp. rate 18, height 5' 7\" (1.702 m), weight 153 lb (69.4 kg), SpO2 100%, unknown if currently breastfeeding. Body mass index is 23.96 kg/m².    Gen: awake, alert patient, NAD  HEENT: EOMI, the sclera appears anicteric, oropharynx clear, mucus membranes appear moist  CV: RRR  Lung: no conversational dyspnea   Abdomen: soft NTND abdomen with NABS appreciated   Back: No CVA tenderness   Skin: dry, warm, no jaundice  Ext: no LE edema is evident  Neuro: Alert, oriented x4 and interactive  Psych: calm, cooperative    Laboratory Data:  Lab Results   Component Value Date    WBC 6.6 07/05/2024    HGB 8.3 07/05/2024    HCT 25.6 07/05/2024    .0 07/05/2024    CREATSERUM 0.88 07/05/2024    BUN 12 07/05/2024     07/05/2024    K 3.9 07/05/2024     07/05/2024    CO2 28.0 07/05/2024     07/05/2024    CA 9.2 07/05/2024    ALB 4.1 07/04/2024    ALKPHO 36 07/04/2024    BILT 0.4 07/04/2024    TP 6.1 07/04/2024    AST 19 07/04/2024    ALT 16 07/04/2024       Imaging:  CT CALCIUM SCORING OVER READ    Result Date: 7/3/2024  CONCLUSION:  1. Cardiac over-read performed. Please see the separately  dictated cardiologist report for further details. 2. No suspicious pulmonary nodules.    Dictated by (CST): Jim Robert MD on 7/03/2024 at 8:14 AM     Finalized by (CST): Jim Robert MD on 7/03/2024 at 8:17 AM           Assessment & Plan   Mikayla Mondragon is a 63 year old female w/ PMHx of BMI 23.96, hx of hepatitis C (1970's, s/p treatment with last hepatitis panel negative), who presented to the ED with palpitations and fatigue with dark stools for the past 5-6 days.    #Melena  #Anemia  -OP labs 7/3 revealed leukocytosis, anemia to 9.1 from baseline 14, iron studies WNL  -Labs on admission reveal worsening anemia to 8.4 with down trend to 7.3.    -Hgb now up trending without transfusion  -No stool yesterday or today, but black/formed  stool noted Monday and Wednesday  -No prior EGD, last CLN 2021 without abnormalities and 10 year recall  -Etiology possible gastritis, PUD, AVM, dieulafoy, less likely neoplasm  -Recommend EGD to further evaluate.  Discussed risks/benefits of procedure.  Pt states understanding and is willing to proceed.    EGD consent: I have discussed the risks, benefits, and alternatives to upper endoscopy/enteroscopy with the patient/primary decision maker [who demonstrated understanding], including but not limited to the risks of bleeding, infection, pain, death, as well as the risks of anesthesia and perforation all leading to prolonged hospitalization, surgical intervention, or even death. I also specifically mentioned the miss rate of upper endoscopy of 5-10% in the best of all circumstances.  The patient has agreed to sign an informed consent and elected to proceed with procedure with possible intervention [i.e. polypectomy, stent placement, etc.] as indicated.      Recommend:  -EGD today  -NPO  -Empiric PPI BID  -Monitor for overt GIB  -Trend Hgb    Thank you for the opportunity to participate in the care of this patient.    Case discussed with Mary Lou Donovan MD and Chely  ROBIN.    Catalina Greenfield DNP, [facility] Gastroenterology  7/5/2024

## 2024-07-06 VITALS
WEIGHT: 153 LBS | OXYGEN SATURATION: 99 % | DIASTOLIC BLOOD PRESSURE: 57 MMHG | HEIGHT: 67 IN | TEMPERATURE: 99 F | RESPIRATION RATE: 18 BRPM | SYSTOLIC BLOOD PRESSURE: 109 MMHG | BODY MASS INDEX: 24.01 KG/M2 | HEART RATE: 64 BPM

## 2024-07-06 PROCEDURE — 99239 HOSP IP/OBS DSCHRG MGMT >30: CPT | Performed by: HOSPITALIST

## 2024-07-06 RX ORDER — PANTOPRAZOLE SODIUM 40 MG/1
40 TABLET, DELAYED RELEASE ORAL
Qty: 60 TABLET | Refills: 1 | Status: SHIPPED | OUTPATIENT
Start: 2024-07-06

## 2024-07-06 NOTE — DISCHARGE INSTRUCTIONS
Repeat EGD is recommended in 8-12 weeks to ensure healing.     Avoid NSAIDs, including Motrin, Aleve, ibuprofen, Naproxen, etc..   Take iron supplementation every other day with citrus.   Take metamucil or miralax to keep the stools soft and regular.

## 2024-07-06 NOTE — PROGRESS NOTES
Progress Note     Mikayla Mondragon Patient Status:  Inpatient    1960 MRN [id number]   Location [facility] 5SW/SE Attending Deidre Vasquez MD   Hosp Day # 1 PCP Misha Goodson MD       Subjective:   S: Patient denies any complaints, no occult bleeding. Awaiting EGD     Review of Systems:   10 point ROS completed and was negative, except for pertinent positive and negatives stated in subjective.    Objective:   Vital signs:  Temp:  [97.7 °F (36.5 °C)-98.8 °F (37.1 °C)] 97.7 °F (36.5 °C)  Pulse:  [59-83] 59  Resp:  [10-19] 16  BP: ()/(41-68) 110/58  SpO2:  [97 %-100 %] 100 %    Wt Readings from Last 6 Encounters:   24 153 lb (69.4 kg)   24 162 lb (73.5 kg)   23 174 lb (78.9 kg)   10/21/22 169 lb (76.7 kg)   22 173 lb (78.5 kg)   21 167 lb (75.8 kg)         Physical Exam:    General: No acute distress. Alert ,         Respiratory: Clear to auscultation bilaterally. No wheezes. No rhonchi.  Cardiovascular: S1, S2. Regular rate and rhythm. No murmurs, rubs or gallops.   Abdomen: Soft, nontender, nondistended.  Positive bowel sounds. No rebound or guarding.  Neurologic: No focal neurological deficits.   Musculoskeletal: Moves all extremities.  Extremities: No edema.    Results:   Diagnostic Data:      Labs:    Labs Last 24 Hours:   BMP     CBC    Other     Na 146 Cl 114 BUN 12 Glu 106   Hb 8.3   PTT - Procal -   K 3.9 CO2 28.0 Cr 0.88   WBC 6.6 >< .0  INR - CRP -   Renal Lytes Endo    Hct 25.6   Trop - D dim -   eGFR - Ca 9.2 POC Gluc  -    LFT   pBNP - Lactic -   eGFR AA - PO4 - A1c -   AST - APk - Prot -  LDL -     Mg - TSH -   ALT - T nir - Alb -        COVID-19 Lab Results    COVID-19  Lab Results   Component Value Date    COVID19 Detected (A) 10/21/2022    COVID19 Not Detected 2021       Pro-Calcitonin  No results for input(s): \"PCT\" in the last 168 hours.    Cardiac  No results for input(s): \"TROP\", \"PBNP\" in the last 168 hours.    Creatinine Kinase  No  results for input(s): \"CK\" in the last 168 hours.    Inflammatory Markers  Recent Labs   Lab 07/03/24  1717   .3       Imaging: Imaging data reviewed in Epic.    Medications:    pantoprazole  40 mg Intravenous Q12H       Assessment & Plan:   ASSESSMENT / PLAN:     # Melenic gastrointestinal bleed.  - likely upper bleed  - HDS. On IV PPI   - GI consulted, plan for EGD today  - monitor H/H, transfuse for hgb <7  - hold anticoag    # Posthemorrhagic anemia, symptomatic.  - monitor H/H, transfuse for Hgb <7      MDM: High complexity- severe exacerbation of chronic illness posing a threat to life. IV meds requiring close inpatient monitoring.        Deidre Vasquez MD    Supplementary Documentation:

## 2024-07-06 NOTE — PLAN OF CARE
Problem: Patient Centered Care  Goal: Patient preferences are identified and integrated in the patient's plan of care  Description: Interventions:  - What would you like us to know as we care for you? I live home alone  - Provide timely, complete, and accurate information to patient/family  - Incorporate patient and family knowledge, values, beliefs, and cultural backgrounds into the planning and delivery of care  - Encourage patient/family to participate in care and decision-making at the level they choose  - Honor patient and family perspectives and choices  Outcome: Progressing     Problem: Patient/Family Goals  Goal: Patient/Family Long Term Goal  Description: Patient's Long Term Goal: to figure out why I get fatigued    Interventions:  - follow md order  -labs  - See additional Care Plan goals for specific interventions  Outcome: Progressing  Goal: Patient/Family Short Term Goal  Description: Patient's Short Term Goal: go back home    Interventions:   - Monitor vital signs  - Monitor appropriate labs  - Administer medications per order  - Pain management as needed  - Follow MD orders  - Diagnostics per orders  - Discharge planning     - See additional Care Plan goals for specific interventions  Outcome: Progressing     Problem: GASTROINTESTINAL - ADULT  Goal: Maintains or returns to baseline bowel function  Description: INTERVENTIONS:  - Assess bowel function  - Maintain adequate hydration with IV or PO as ordered and tolerated  - Evaluate effectiveness of GI medications  - Encourage mobilization and activity  - Obtain nutritional consult as needed  - Establish a toileting routine/schedule  - Consider collaborating with pharmacy to review patient's medication profile  Outcome: Progressing     Problem: HEMATOLOGIC - ADULT  Goal: Maintains hematologic stability  Description: INTERVENTIONS  - Assess for signs and symptoms of bleeding or hemorrhage  - Monitor labs and vital signs for trends  - Administer  supportive blood products/factors, fluids and medications as ordered and appropriate  - Administer supportive blood products/factors as ordered and appropriate  Outcome: Progressing      Patient is alert and oriented x 4. She denies to be in any pain or discomfort at this time. No acute changes at this time. Safety and fall precautions maintained. Call light within reach. Frequent rounding by nursing staff.

## 2024-07-06 NOTE — PROGRESS NOTES
PT discharged, brother provided transportation home.  Discharge paperwork and prescriptions reviewed, all questions and concerns addressed.  IV access and tele discontinued.

## 2024-07-08 ENCOUNTER — TELEPHONE (OUTPATIENT)
Facility: CLINIC | Age: 64
End: 2024-07-08

## 2024-07-08 ENCOUNTER — PATIENT OUTREACH (OUTPATIENT)
Dept: CASE MANAGEMENT | Age: 64
End: 2024-07-08

## 2024-07-08 DIAGNOSIS — K25.9 GASTRIC ULCER, UNSPECIFIED CHRONICITY, UNSPECIFIED WHETHER GASTRIC ULCER HEMORRHAGE OR PERFORATION PRESENT: Primary | ICD-10-CM

## 2024-07-08 NOTE — PAYOR COMM NOTE
--------------  ADMISSION REVIEW     Payor: LULU BRADY  Subscriber #:  [id number]  Authorization Number: [id number]    Admit date: 7/4/24  Admit time:  5:19 PM       Patient Seen in: [facility]    History     Stated Complaint: Dark stool, HOTN    Subjective:   63-year-old female with no active medical problems who is only on supplements states she has been feeling fatigued for 5 to 6 days.  Then she started getting rapid heartbeat on exertion and fatigue on exertion.  For the last 3 days she saw black stool despite not changing any diet.  She denies NSAID abuse.  No abdominal pain.  Last colonoscopy was in 2021.      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 Lisette Coronado     History of hepatitis    Hypothyroidism    Lipid screening    Measles    Mumps    Viral hepatitis C     Past Surgical History:   Procedure Laterality Date    Colonoscopy  02/01/2008    Dr. Lisette Coronado, Repeat colonoscopy in five years. Family history of coloncancer     Colonoscopy N/A 03/24/2021    Procedure: COLONOSCOPY;  Surgeon: Nehemias Valdez MD;  Location: [facility] ENDO    Needle biopsy right      benign     Tonsillectomy  1982     Physical Exam     ED Triage Vitals [07/04/24 1451]   /81   Pulse 99   Resp 16   Temp 98.1 °F (36.7 °C)   Temp src Oral   SpO2 99 %   O2 Device None (Room air)     Current Vitals:   Vital Signs  BP: 103/81  Pulse: 99  Resp: 16  Temp: 98.1 °F (36.7 °C)  Temp src: Oral    Physical Exam  HENT:      Head: Normocephalic.      Mouth/Throat:      Mouth: Mucous membranes are moist.      Pharynx: Oropharynx is clear.   Eyes:      Extraocular Movements: Extraocular movements intact.      Pupils: Pupils are equal, round, and reactive to light.      Comments: Conjunctival pallor   Cardiovascular:      Rate and Rhythm: Normal rate and  regular rhythm.      Heart sounds: Normal heart sounds.   Pulmonary:      Effort: Pulmonary effort is normal.      Breath sounds: Normal breath sounds.   Abdominal:      Palpations: Abdomen is soft.      Tenderness: There is no abdominal tenderness.   Musculoskeletal:         General: No swelling or tenderness. Normal range of motion.      Cervical back: Normal range of motion.   Lymphadenopathy:      Cervical: No cervical adenopathy.   Skin:     General: Skin is warm.      Capillary Refill: Capillary refill takes less than 2 seconds.   Neurological:      General: No focal deficit present.      Mental Status: She is alert.     Labs Reviewed   COMP METABOLIC PANEL (14) - Abnormal; Notable for the following components:       Result Value    Glucose 139 (*)     Calculated Osmolality 299 (*)     Alkaline Phosphatase 36 (*)     A/G Ratio 2.1 (*)     All other components within normal limits   CBC W/ DIFFERENTIAL - Abnormal; Notable for the following components:    RBC 2.63 (*)     HGB 8.4 (*)     HCT 25.5 (*)     Immature Platelet Fraction 13.9 (*)         Medical Decision Making  Patient with fatigue, some palpitations on exertion and also melena.  Differential is vast could include GI bleed, anemia, food related, medication related, viral and many other possibilities such as electrolyte disturbances.  Will do full workup.  Stool was heme positive so very suspicious that this is GI bleed and anemia.  Will give Protonix and fluids and speak with GI    Amount and/or Complexity of Data Reviewed  External Data Reviewed: labs.     Details: Hemoglobin yesterday was 9.1  Labs: ordered. Decision-making details documented in ED Course.  Discussion of management or test interpretation with external provider(s): Protonix and fluids ordered.  Discussed with hospitalist and gastroenterologist Dr. Donovan.    Disposition and Plan     Clinical Impression:  1. Gastrointestinal hemorrhage, unspecified gastrointestinal hemorrhage type     2. Anemia, unspecified type            History and Physical       CHIEF COMPLAINT:  Melena, gastrointestinal bleed.     HISTORY OF PRESENT ILLNESS:  Patient is a 63-year-old  female who came into the emergency department for evaluation of palpitations and fatigue for the last day or 2.  Patient reported that she has been seeing melenic dark bowel movements for the last 5 to 6 days.  CBC today showed hemoglobin of 8.4.  Hemoglobin yesterday was 9.1, and baseline is 14.  She received Protonix in the emergency room, and she will be admitted to the hospital for further management.      PAST MEDICAL HISTORY:  Records indicate hepatitis C in the 1970s.  In 2021, she had hepatitis profile panel which was negative.      PAST SURGICAL HISTORY:  None.     MEDICATIONS:  Currently none.  Denies any NSAIDs over-the-counter.     ALLERGIES:  No known drug allergies.     FAMILY HISTORY:  Mother had hypertension.  Father had coronary artery disease.       SOCIAL HISTORY:  No tobacco.  Occasional alcohol.  No drug use.  Independent in her basic activities of daily living.      REVIEW OF SYSTEMS:  Patient reports melenic bowel movements on a daily basis for the last 5 to 6 days.  She denies dyspepsia, nausea, or vomiting.  No epigastric pain.  Today, she felt palpitations and dyspnea on exertion.  Other 12-point review of systems is negative.        PHYSICAL EXAMINATION:    GENERAL:  Alert and oriented to time, place and person.  No acute distress.   VITAL SIGNS:  Temperature 98.1, pulse 99, respiratory rate 16, blood pressure 103/81, pulse ox 99% on room air.  HEENT:  Atraumatic.  Oropharynx clear.  Moist mucous membranes.  Normal hard and soft palate.  Eyes:  Anicteric sclerae.    NECK:  Supple.  No lymphadenopathy.  Trachea midline.  Full range of motion.   LUNGS:  Clear to auscultation bilaterally.  Normal respiratory effort.    HEART:  Regular rate and rhythm.  S1 and S2 auscultated.  No murmur.    ABDOMEN:  Soft,  nondistended.  No tenderness.  Positive bowel sounds.   EXTREMITIES:  No peripheral edema, clubbing or cyanosis.   NEUROLOGIC:  Motor and sensory intact.  Cranial nerves II to XII are intact.       ASSESSMENT:    1.       Melenic gastrointestinal bleed.  2.       Posthemorrhagic anemia, symptomatic.     PLAN:  Patient will be admitted to general medical floor.  IV Protonix.  Monitor hemodynamic status.  Repeat H and H in 6 hours.  If hemoglobin is below 7, she will need blood transfusion.  Obtain gastroenterology consult.  Further recommendations to follow.      7/5:    GI:    Reason for Consultation:  Melena     History of Present Illness:  Mikayla Mondragon is a 63 year old female w/ PMHx of BMI 23.96, hx of hepatitis C (1970's, s/p treatment with last hepatitis panel negative), who presented to the ED with palpitations and fatigue with dark stools for the past 5-6 days.     Pt states that she had been in her usual state of health until last weekend when she started to notice progressively worsening SOB with exertion along with palpitations and night sweats associated with black stools that started this past Monday.  Her last brown stool was Friday with no BM over the weekend.  She denies ABD pain, N/V, dyspepsia, difficult/painful swallowing, constipation/diarrhea, maroon/bloody stools, fever, chills, chest pain and unintentional weight loss.  She reports she does not use NSAIDs or blood thinners.         Medications:    Current Hospital Medications[]Expand by Default      Current Facility-Administered Medications:     sodium chloride 0.9% infusion, , Intravenous, Continuous    acetaminophen (Tylenol Extra Strength) tab 500 mg, 500 mg, Oral, Q4H PRN    ondansetron (Zofran) 4 MG/2ML injection 4 mg, 4 mg, Intravenous, Q6H PRN    metoclopramide (Reglan) 5 mg/mL injection 10 mg, 10 mg, Intravenous, Q8H PRN    pantoprazole (Protonix) 40 mg in sodium chloride 0.9% PF 10 mL IV push, 40 mg, Intravenous, Daily    temazepam  (Restoril) cap 7.5 mg, 7.5 mg, Oral, Nightly PRN       Pertinent Family Hx:  - No known history of esophageal, gastric CA. Paternal grandmother with colon CA Dx late 80's.  - No known IBD  - No known liver pathologies      Physical Exam:    Blood pressure 106/68, pulse 61, temperature 98.8 °F (37.1 °C), temperature source Oral, resp. rate 18, height 5' 7\" (1.702 m), weight 153 lb (69.4 kg), SpO2 100%, unknown if currently breastfeeding. Body mass index is 23.96 kg/m².     Gen: awake, alert patient, NAD  HEENT: EOMI, the sclera appears anicteric, oropharynx clear, mucus membranes appear moist  CV: RRR  Lung: no conversational dyspnea   Abdomen: soft NTND abdomen with NABS appreciated   Back: No CVA tenderness   Skin: dry, warm, no jaundice  Ext: no LE edema is evident  Neuro: Alert, oriented x4 and interactive  Psych: calm, cooperative     Laboratory Data:        Lab Results   Component Value Date     WBC 6.6 07/05/2024     HGB 8.3 07/05/2024     HCT 25.6 07/05/2024     .0 07/05/2024     CREATSERUM 0.88 07/05/2024     BUN 12 07/05/2024      07/05/2024     K 3.9 07/05/2024      07/05/2024     CO2 28.0 07/05/2024      07/05/2024     CA 9.2 07/05/2024       Assessment & Plan   Mikayla Mondragon is a 63 year old female w/ PMHx of BMI 23.96, hx of hepatitis C (1970's, s/p treatment with last hepatitis panel negative), who presented to the ED with palpitations and fatigue with dark stools for the past 5-6 days.     #Melena  #Anemia  -OP labs 7/3 revealed leukocytosis, anemia to 9.1 from baseline 14, iron studies WNL  -Labs on admission reveal worsening anemia to 8.4 with down trend to 7.3.    -Hgb now up trending without transfusion  -No stool yesterday or today, but black/formed  stool noted Monday and Wednesday  -No prior EGD, last CLN 2021 without abnormalities and 10 year recall  -Etiology possible gastritis, PUD, AVM, dieulafoy, less likely neoplasm  -Recommend EGD to further evaluate.  Discussed  risks/benefits of procedure.  Pt states understanding and is willing to proceed.      Procedure: EGD w/ cold biopsy     Pre-operative diagnosis: melena, blood loss anemia     Post-operative diagnosis: gastric ulcer, gastritis       Findings:       1. Esophagus: The squamocolumnar junction was noted at 39 cm and appeared normal. The diaphragmatic pinch was noted noted at 39 cm from the incisors. The esophageal mucosa appeared normal. There was no endoscopic findings of esophagitis, stricture or Horvath's esophagus.     2. Stomach: The stomach distended normally. Normal rugal folds were seen. The pylorus was patent. There was mild, patchy erythema in the stomach diffusely and one 5 mm clean based, Cristóbal Class III ulcer at the lesser curvature/incisura. Cold forceps biopsies were taken of the antrum, incisura, and body and of the ulcer. Retroflexion revealed a normal fundus and a non-patulous cardia.      3. Duodenum: The duodenal mucosa appeared normal in the 1st and 2nd portion of the duodenum.      Impression:  1. One small clean based gastric ulcer. Biopsied. This is the likely source of melena.   2. Mild, patchy gastritis. Biopsied.      Recommend:  1. Await pathology.  2. Continue pantoprazole 40 mg every 12 hours.   3. Repeat EGD is recommended in 8-12 weeks to ensure healing.    4. Avoid NSAIDs, including Motrin, Aleve, ibuprofen, Naproxen, etc..  5. Take iron supplementation every other day with citrus.  6. Take metamucil or miralax to keep the stools soft and regular.

## 2024-07-08 NOTE — TELEPHONE ENCOUNTER
Patient needs to schedule a Esophagogastroduodenoscopy in 8 to 12 weeks per Dr. Donovan please call

## 2024-07-08 NOTE — PAYOR COMM NOTE
--------------  DISCHARGE REVIEW    Payor: LULU BRADY  Subscriber #:  [id number]  Authorization Number: [id number]    Admit date: 7/4/24  Admit time:   5:19 PM  Discharge Date: 7/6/2024  2:06 PM

## 2024-07-11 NOTE — DISCHARGE SUMMARY
[facility]     Mikayla Mondragon Patient Status:  Observation    1960 MRN [id number]   Location [facility] 5SW/SE Attending No att. providers found   Hosp Day # 1 PCP Misha Goodson MD     Date of Admission: 2024  Date of Discharge: 2024  Discharge Disposition: Home or Self Care    Admitting Chief Complaint:   Gastrointestinal hemorrhage, unspecified gastrointestinal hemorrhage type [K92.2]  Anemia, unspecified type [D64.9]    PCP: Misha Goodson MD    Discharge Diagnosis:    gastric ulcer, gastritis with acute GI bleed  Acute post hemorrhagic anemia      History of Present Illness:   Per Dr. Danielle   Patient is a 63-year-old  female who came into the emergency department for evaluation of palpitations and fatigue for the last day or 2.  Patient reported that she has been seeing melenic dark bowel movements for the last 5 to 6 days.  CBC today showed hemoglobin of 8.4.  Hemoglobin yesterday was 9.1, and baseline is 14.  She received Protonix in the emergency room, and she will be admitted to the hospital for further management.      PAST MEDICAL HISTORY:  Records indicate hepatitis C in the 1970s.  In , she had hepatitis profile panel which was negative.         Brief Synopsis:   -OP labs 7/3 revealed leukocytosis, anemia to 9.1 from baseline 14, iron studies WNL  -Labs on admission reveal worsening anemia to 8.4 with down trend to 7.3.    -No prior EGD, last CLN  without abnormalities and 10 year recall   -GI consulted  --Empiric PPI BID   -EGD revealed One small clean based gastric ulcer. Biopsied. This is the likely source of melena. Mild, patchy gastritis. Biopsied.  -per GI recommendations:  1. Follow up pathology.  2. Continue pantoprazole 40 mg every 12 hours.   3. Repeat EGD is recommended in 8-12 weeks to ensure healing.    4. Avoid NSAIDs, including Motrin, Aleve, ibuprofen, Naproxen, etc..  5. Take iron supplementation every other day with  citrus.  6. Take metamucil or miralax to keep the stools soft and regular.           Discharge Medication List:     Discharge Medications        START taking these medications        Instructions Prescription details   pantoprazole 40 MG Tbec  Commonly known as: Protonix      Take 1 tablet (40 mg total) by mouth 2 (two) times daily before meals.   Quantity: 60 tablet  Refills: 1               Where to Get Your Medications        These medications were sent to [id number] - [place], [place] - [address] [phone], [phone]  [address], [facility] [address]      Phone: [phone]   pantoprazole 40 MG Tbec         Follow-up appointment:   Misha Goodson MD  [address] [place] [address] [address]  [facility] [id number] [phone]    Schedule an appointment as soon as possible for a visit in 1 week(s)  post hospiatlization check up    Mary Lou Donovan MD  [address] [place] [id number] [phone]    Schedule an appointment as soon as possible for a visit in 6 week(s)        Vital signs:       Physical Exam:    General: No acute distress.   Respiratory: Clear to auscultation bilaterally. No wheezes. No rhonchi.  Cardiovascular: S1, S2. Regular rate and rhythm. No murmurs, rubs or gallops.   Abdomen: Soft, nontender, nondistended.  Positive bowel sounds. No rebound or guarding.  Neurologic: No focal neurological deficits.   Musculoskeletal: Moves all extremities.  Extremities: No edema.  -----------------------------------------------------------------------------------------------  PATIENT DISCHARGE INSTRUCTIONS: See electronic chart    I d/w pt  the available results, management plan, medications changes, and discharge instructions including follow ups as outlined above.   Scripts sent to pharmacy.      Hospital Discharge Diagnoses:  GIB    Lace+ Score: 37  59-90 High Risk  29-58 Medium Risk  0-28   Low Risk.    TCM Follow-Up Recommendation:  LACE 29-58: Moderate Risk of readmission after discharge from the  hospitals.        OSKAR MARINO MD 7/10/2024    Time spent:  > 30 minutes

## 2024-07-15 ENCOUNTER — OFFICE VISIT (OUTPATIENT)
Dept: FAMILY MEDICINE CLINIC | Facility: CLINIC | Age: 64
End: 2024-07-15
Payer: COMMERCIAL

## 2024-07-15 VITALS
DIASTOLIC BLOOD PRESSURE: 64 MMHG | RESPIRATION RATE: 16 BRPM | BODY MASS INDEX: 24.8 KG/M2 | WEIGHT: 158 LBS | SYSTOLIC BLOOD PRESSURE: 110 MMHG | HEART RATE: 74 BPM | OXYGEN SATURATION: 98 % | HEIGHT: 67 IN

## 2024-07-15 DIAGNOSIS — D50.0 IRON DEFICIENCY ANEMIA DUE TO CHRONIC BLOOD LOSS: ICD-10-CM

## 2024-07-15 DIAGNOSIS — K25.0 ACUTE GASTRIC ULCER WITH HEMORRHAGE: Primary | ICD-10-CM

## 2024-07-15 PROCEDURE — 99214 OFFICE O/P EST MOD 30 MIN: CPT | Performed by: PHYSICIAN ASSISTANT

## 2024-07-15 RX ORDER — FERROUS SULFATE 325(65) MG
325 TABLET, DELAYED RELEASE (ENTERIC COATED) ORAL EVERY OTHER DAY
COMMUNITY

## 2024-07-15 NOTE — TELEPHONE ENCOUNTER
Dr. Donovan-   Your next opening is a few weeks past the 12 week comfort for patients recall. Ok to add to next opening on 10/21? Please advise.   Thank you!

## 2024-07-15 NOTE — TELEPHONE ENCOUNTER
GI Staff:     Please schedule: EGD with MAC on or after 8/30/24.     Diagnosis: gastric ulcer    Medication adjustments: none. She should continue the pantoprazole 40 mg twice a day and avoid all NSAIDs until the repeat EGD.  Thanks, SS

## 2024-07-15 NOTE — PROGRESS NOTES
Subjective:   Patient ID: Mikayla Mondragon is a 63 year old female.    HPI  Patient presents for follow up of recent admission from 7/4/24 - 7/6/24  Was admitted for GI bleed, anemia  During admission she underwent EGD which showed a small gastric ulcer which was biopsied and thought to be the source of bleeding/anemia  Also had mild, patchy gastritis  She was discharged home on iron supplement and protonix  Advised to avoid NSAIDs and take fiber/miralax to keep stools soft    GI recommends repeating the EGD on or after 8/30/24 and to have her take PPI BID until then    She is taking iron supplements 65 mg every other day with citrus    Still feeling some fatigue, weakness but much better than before she went to ER  She is in very good spirits      History/Other:   Review of Systems   Constitutional:  Positive for fatigue. Negative for chills and fever.   HENT:  Negative for congestion, ear pain, rhinorrhea and sore throat.    Eyes:  Negative for visual disturbance.   Respiratory:  Negative for cough, shortness of breath and wheezing.    Cardiovascular:  Negative for chest pain, palpitations and leg swelling.   Gastrointestinal:  Negative for abdominal pain, anal bleeding, blood in stool, diarrhea, nausea and vomiting.   Genitourinary:  Negative for dysuria, frequency and hematuria.   Musculoskeletal:  Negative for arthralgias, gait problem and myalgias.   Skin:  Negative for rash.   Neurological:  Positive for weakness (generalized). Negative for light-headedness and headaches.   Hematological:  Negative for adenopathy.   Psychiatric/Behavioral:  Negative for dysphoric mood. The patient is not nervous/anxious.      Current Outpatient Medications   Medication Sig Dispense Refill    ferrous sulfate 325 (65 FE) MG Oral Tab EC Take 1 tablet (325 mg total) by mouth every other day.      pantoprazole 40 MG Oral Tab EC Take 1 tablet (40 mg total) by mouth 2 (two) times daily before meals. 60 tablet 1     Allergies:No Known  Allergies    Objective:   Physical Exam  Vitals and nursing note reviewed.   Constitutional:       Appearance: She is well-developed.   HENT:      Head: Normocephalic and atraumatic.   Eyes:      Conjunctiva/sclera: Conjunctivae normal.      Pupils: Pupils are equal, round, and reactive to light.   Cardiovascular:      Rate and Rhythm: Normal rate and regular rhythm.      Heart sounds: Normal heart sounds.   Pulmonary:      Effort: Pulmonary effort is normal.      Breath sounds: Normal breath sounds. No wheezing or rales.   Abdominal:      General: There is no distension.      Tenderness: There is no abdominal tenderness. There is no guarding.   Musculoskeletal:      Cervical back: Normal range of motion and neck supple.   Neurological:      Mental Status: She is alert.         Assessment & Plan:   1. Acute gastric ulcer with hemorrhage  2. Iron deficiency anemia due to chronic blood loss  Symptoms continue to improve since admission. Discussed taking iron supplement daily and continuation of PPI. Has scheduled follow up with GI. Will plan to recheck labs in 3 months. Sooner if her clinical picture changes. If she cannot tolerate oral iron, will refer to hematology for IV  infusion if appropriate.   - CBC With Differential With Platelet; Future  - Iron And Tibc; Future  - Ferritin; Future

## 2024-07-16 PROBLEM — K92.1 MELENA: Status: RESOLVED | Noted: 2024-07-04 | Resolved: 2024-07-16

## 2024-07-16 NOTE — TELEPHONE ENCOUNTER
Scheduled for:  Colonoscopy [id number]    Provider Name:  Dr Donovan    Date:  10/21/2024    Location:   [facility]     Sedation:  MAC    Time:  230 pm    Prep:  NPO after midnight    Meds/Allergies Reconciled?:  Physician reviewed     Diagnosis with codes:    Gastric ulcer, unspecified chronicity, unspecified whether gastric ulcer hemorrhage or perforation present [K25.9]     Was patient informed to call insurance with codes (Y/N):  Yes, I confirmed [employer] insurance with the patient.     Referral sent?:  N/A    [facility] or [facility] notified?:  I sent an electronic request to Endo Scheduling and received a confirmation today.      Medication Orders:  This patient verbally confirmed that she is not taking:    Iron, blood thinners, BP meds, and is not diabetic    Not latex allergy, Not PCN allergy and does not have a pacemaker     Misc Orders:       Further instructions given by staff:

## 2024-07-16 NOTE — TELEPHONE ENCOUNTER
Left message for patient to call back to schedule Esophagogastroduodenoscopy.     Please transfer call to GI surgery scheduling

## 2024-07-16 NOTE — TELEPHONE ENCOUNTER
Left message for patient to call back to schedule Esophogastroduodenoscopy.     Please transfer call to GI surgery scheduling

## 2024-08-15 ENCOUNTER — HOSPITAL ENCOUNTER (OUTPATIENT)
Dept: ULTRASOUND IMAGING | Age: 64
Discharge: HOME | End: 2024-08-15
Attending: FAMILY MEDICINE

## 2024-08-15 DIAGNOSIS — Z13.6 SCREENING FOR HEART DISEASE: ICD-10-CM

## 2024-08-27 ENCOUNTER — LAB ENCOUNTER (OUTPATIENT)
Dept: LAB | Age: 64
End: 2024-08-27
Attending: PHYSICIAN ASSISTANT
Payer: COMMERCIAL

## 2024-08-27 ENCOUNTER — OFFICE VISIT (OUTPATIENT)
Dept: GASTROENTEROLOGY | Facility: CLINIC | Age: 64
End: 2024-08-27
Payer: COMMERCIAL

## 2024-08-27 VITALS
BODY MASS INDEX: 24.48 KG/M2 | WEIGHT: 156 LBS | HEART RATE: 59 BPM | SYSTOLIC BLOOD PRESSURE: 102 MMHG | HEIGHT: 67 IN | DIASTOLIC BLOOD PRESSURE: 66 MMHG

## 2024-08-27 DIAGNOSIS — K25.0 ACUTE GASTRIC ULCER WITH HEMORRHAGE: Primary | ICD-10-CM

## 2024-08-27 DIAGNOSIS — K25.0 ACUTE GASTRIC ULCER WITH HEMORRHAGE: ICD-10-CM

## 2024-08-27 DIAGNOSIS — D50.0 IRON DEFICIENCY ANEMIA DUE TO CHRONIC BLOOD LOSS: ICD-10-CM

## 2024-08-27 LAB
BASOPHILS # BLD AUTO: 0.07 X10(3) UL (ref 0–0.2)
BASOPHILS NFR BLD AUTO: 1 %
DEPRECATED HBV CORE AB SER IA-ACNC: 53.3 NG/ML
DEPRECATED RDW RBC AUTO: 43.1 FL (ref 35.1–46.3)
EOSINOPHIL # BLD AUTO: 0.12 X10(3) UL (ref 0–0.7)
EOSINOPHIL NFR BLD AUTO: 1.8 %
ERYTHROCYTE [DISTWIDTH] IN BLOOD BY AUTOMATED COUNT: 12.2 % (ref 11–15)
HCT VFR BLD AUTO: 44.7 %
HGB BLD-MCNC: 14.6 G/DL
IMM GRANULOCYTES # BLD AUTO: 0.02 X10(3) UL (ref 0–1)
IMM GRANULOCYTES NFR BLD: 0.3 %
IRON SATN MFR SERPL: 18 %
IRON SERPL-MCNC: 67 UG/DL
LYMPHOCYTES # BLD AUTO: 1.54 X10(3) UL (ref 1–4)
LYMPHOCYTES NFR BLD AUTO: 23.1 %
MCH RBC QN AUTO: 31.4 PG (ref 26–34)
MCHC RBC AUTO-ENTMCNC: 32.7 G/DL (ref 31–37)
MCV RBC AUTO: 96.1 FL
MONOCYTES # BLD AUTO: 0.36 X10(3) UL (ref 0.1–1)
MONOCYTES NFR BLD AUTO: 5.4 %
NEUTROPHILS # BLD AUTO: 4.57 X10 (3) UL (ref 1.5–7.7)
NEUTROPHILS # BLD AUTO: 4.57 X10(3) UL (ref 1.5–7.7)
NEUTROPHILS NFR BLD AUTO: 68.4 %
PLATELET # BLD AUTO: 184 10(3)UL (ref 150–450)
PLATELETS.RETICULATED NFR BLD AUTO: 19.3 % (ref 0–7)
RBC # BLD AUTO: 4.65 X10(6)UL
TIBC SERPL-MCNC: 367 UG/DL (ref 250–425)
TRANSFERRIN SERPL-MCNC: 246 MG/DL (ref 250–380)
WBC # BLD AUTO: 6.7 X10(3) UL (ref 4–11)

## 2024-08-27 PROCEDURE — 82728 ASSAY OF FERRITIN: CPT

## 2024-08-27 PROCEDURE — 85025 COMPLETE CBC W/AUTO DIFF WBC: CPT

## 2024-08-27 PROCEDURE — 83540 ASSAY OF IRON: CPT

## 2024-08-27 PROCEDURE — 99214 OFFICE O/P EST MOD 30 MIN: CPT | Performed by: INTERNAL MEDICINE

## 2024-08-27 PROCEDURE — 84466 ASSAY OF TRANSFERRIN: CPT

## 2024-08-27 PROCEDURE — 36415 COLL VENOUS BLD VENIPUNCTURE: CPT

## 2024-08-27 RX ORDER — PANTOPRAZOLE SODIUM 40 MG/1
40 TABLET, DELAYED RELEASE ORAL
Qty: 180 TABLET | Refills: 3 | Status: SHIPPED | OUTPATIENT
Start: 2024-08-27

## 2024-08-27 NOTE — PATIENT INSTRUCTIONS
PLAN    - CBC today (already ordered)    - Continue iron supplementation    - Keep the appointment for the EGD    - Continue pantoprazole 40 mg twice a day until the procedure    - Avoid NSAIDs

## 2024-08-27 NOTE — PROGRESS NOTES
[facility] - Gastroenterology                                                                                                      Clinic Follow-up Visit    Chief Complaint   Patient presents with    ER F/U     EGD done 7/05/2024         Subjective/HPI:     63 F with h/o hepatitis C (in the 1970s s/p treatment and last hepatitis panel with SVR) here for the following:    Gastric ulcer - etiology unclear. The ptt denies NSAID use and gastric biopsies were negative for H. Pylori. Pt had melena and a ~6 g drop in hgb in July 2024 that led to an admission. S/p EGD on the admission that revealed a clean based gastric ulcer. Since discharge, she has been feeling well and denies any symptoms. She has no abdominal pain, n/v, or evidence of GI bleeding. She has been taking and tolerating the iron supplementation without issue, and her endurance is improving slowly. The next EGD to ensure healing of the gastric ulcer is scheduled in October.          EGD 7/2024  Impression:  1. One small clean based gastric ulcer. Biopsied. This is the likely source of melena.   2. Mild, patchy gastritis. Biopsied.      Recommend:  1. Await pathology.  2. Continue pantoprazole 40 mg every 12 hours.   3. Repeat EGD is recommended in 8-12 weeks to ensure healing.    4. Avoid NSAIDs, including Motrin, Aleve, ibuprofen, Naproxen, etc..  5. Take iron supplementation every other day with citrus.  6. Take metamucil or miralax to keep the stools soft and regular.  7. OK to discharge from GI standpoint.     Final Diagnosis:      A. Random gastric biopsy:  Fragments of gastric oxyntic and antral type mucosa with mild chronic inactive gastritis.  Diff-Quik stain (appropriate control reactivity) shows no definitive evidence of H. pylori-like microorganisms.  No evidence of dysplasia or carcinoma identified.     B. Gastric ulcer; biopsy:   Superficial strips of  gastric foveolar mucosa with mild edema.  Diff-Quik stain (appropriate control reactivity) shows no definitive evidence of H. pylori-like microorganisms.  No evidence of dysplasia or carcinoma identified.        Last CLN in 2021 - recall in 10 years recommended.     Wt Readings from Last 6 Encounters:   08/27/24 156 lb (70.8 kg)   07/15/24 158 lb (71.7 kg)   07/05/24 153 lb (69.4 kg)   05/01/24 162 lb (73.5 kg)   02/20/23 174 lb (78.9 kg)   10/21/22 169 lb (76.7 kg)        History, Medications, Allergies, ROS:      Past Medical History:    Amenorrhea    Last period    Anxiety    Job threatened[place]-in [place]/fitness program    Chicken pox    Disorder of thyroid    Hemorrhoids    Dr. Lisette Coronado     History of hepatitis    Hypothyroidism    Lipid screening    Measles    Mumps    Viral hepatitis C      Past Surgical History:   Procedure Laterality Date    Colonoscopy  02/01/2008    Dr. Lisette Coronado, Repeat colonoscopy in five years. Family history of coloncancer     Colonoscopy N/A 03/24/2021    Procedure: COLONOSCOPY;  Surgeon: Nehemias Valdez MD;  Location: [facility] ENDO    Needle biopsy right      benign     Tonsillectomy  1982      Family History   Problem Relation Age of Onset    Heart Disease Father         Coronary Artery Disease     Heart Surgery Father         Coronary artery bypass grafting     Heart Disorder Father         Triple bypass 1983, no complications since    Lipids Father         Hyperlipidemia     Dementia Father     Hypertension Mother     Psychiatric Mother         Severe anxiety 1920's, Shock therapy 1962    Other (Pschyologicals problems) Mother     Depression Mother     Cancer Paternal Grandmother         Colon cancer 1993    Cancer Paternal Grandfather         Brain cancer 1959    Cancer Sister     Breast Cancer Sister 57    Stroke Other         Grandmother     Colon Cancer Other         Family history of       Social History:   Social History     Socioeconomic History     Marital status: Single   Tobacco Use    Smoking status: Never    Smokeless tobacco: Never   Vaping Use    Vaping status: Never Used   Substance and Sexual Activity    Alcohol use: Yes     Comment: 1 drink a month    Drug use: No    Sexual activity: Never   Other Topics Concern    Caffeine Concern No    Stress Concern No    Weight Concern No    Special Diet No    Exercise Yes    Seat Belt Yes     Social Determinants of Health     Food Insecurity: No Food Insecurity (7/4/2024)    Food Insecurity     Food Insecurity: Never true   Transportation Needs: No Transportation Needs (7/4/2024)    Transportation Needs     Lack of Transportation: No   Housing Stability: Low Risk  (7/4/2024)    Housing Stability     Housing Instability: No        Medications (Active prior to today's visit):  Current Outpatient Medications   Medication Sig Dispense Refill    pantoprazole 40 MG Oral Tab EC Take 1 tablet (40 mg total) by mouth 2 (two) times daily before meals. 180 tablet 3    ferrous sulfate 325 (65 FE) MG Oral Tab EC Take 1 tablet (325 mg total) by mouth 2 (two) times daily with meals.         Allergies:  No Known Allergies    ROS:   CONSTITUTIONAL:  negative for fevers, chills  EYES:  negative for change in vision  RESPIRATORY:  negative for  shortness of breath  CARDIOVASCULAR:  negative for  chest pain  GASTROINTESTINAL:  see HPI  GENITOURINARY:  negative for dysuria  INTEGUMENT/BREAST:  SKIN:  negative for  rash  ALLERGIC/IMMUNOLOGIC:  negative for hay fever  ENDOCRINE:  negative for cold intolerance and heat intolerance  MUSCULOSKELETAL:  negative for  joint stiffness and joint swelling  BEHAVIOR/PSYCH:  negative for depressed mood    PHYSICAL EXAM:   Blood pressure 102/66, pulse 59, height 5' 7\" (1.702 m), weight 156 lb (70.8 kg), unknown if currently breastfeeding.    Gen- Patient appears comfortable and in no acute discomfort  HEENT: the sclera appears anicteric, oropharynx clear, mucus membranes appear moist  CV- regular  rate and rhythm, the extremities are warm and well perfused   Lung- Moves air well; No labored breathing  Abdomen- soft, non-tender exam in all quadrants without rigidity or guarding, non-distended, no abnormal bowel sounds noted, no masses are palpated  Skin- No jaundice  Ext: no cyanosis, clubbing or edema is evident.   Neuro- Alert and oriented x4, and patient is having movement of all 4 extremities   Psych - appropriate, non-agitated    Labs/Imaging:     Patient's labs and imaging were reviewed and discussed with patient today.     .  ASSESSMENT/PLAN:     63 F with h/o hepatitis C (in the 1970s s/p treatment and last hepatitis panel with SVR) here for the following:    Gastric ulcer - etiology unclear. The ptt denies NSAID use and gastric biopsies were negative for H. Pylori. Pt had melena and a ~6 g drop in hgb in July 2024 that led to an admission. S/p EGD on the admission that revealed a clean based gastric ulcer. Since discharge, she has been feeling well and denies any symptoms. She has no abdominal pain, n/v, or evidence of GI bleeding. She has been taking and tolerating the iron supplementation without issue, and her endurance is improving slowly. The next EGD to ensure healing of the gastric ulcer is scheduled in October.  We discussed it may take a few months to see normalization of her hgb. She is curious to see where it is today.  CBC and iron studies already ordered by her PCP.      CRC screening - last CLN was in 2021 and recall recommended in 10 years.      Recommend    - EGD with MAC 8-12 weeks after the last EGD to ensure  healing of the ulcer    - Continue pantoprazole 40 mg BID until the EGD    - Continue iron supplementation    - Continue to avoid NSAIDs    - CBC today    - Will plan to r/o H. Pylori off PPI therapy if gastric biopsies are again negative on the next EGD       EGD consent: I have discussed the risks, benefits, and alternatives to upper endoscopy/enteroscopy with the  patient/primary decision maker [who demonstrated understanding], including but not limited to the risks of bleeding, infection, pain, death, as well as the risks of anesthesia and perforation all leading to prolonged hospitalization, surgical intervention, or even death. I also specifically mentioned the miss rate of upper endoscopy of 5-10% in the best of all circumstances.  The patient has agreed to sign an informed consent and elected to proceed with procedure with possible intervention [i.e. polypectomy, stent placement, etc.] as indicated.        Orders This Visit:  No orders of the defined types were placed in this encounter.      Meds This Visit:  Requested Prescriptions     Signed Prescriptions Disp Refills    pantoprazole 40 MG Oral Tab  tablet 3     Sig: Take 1 tablet (40 mg total) by mouth 2 (two) times daily before meals.       Imaging & Referrals:  None     8/27/2024     Mary Lou Donovan MD        This note may have been partially prepared using Dragon Medical voice recognition dictation software. As a result, errors may occur. When identified, these errors have been corrected. While every attempt is made to correct errors during dictation, discrepancies may still exist.

## 2024-09-12 ENCOUNTER — OFFICE VISIT (OUTPATIENT)
Dept: FAMILY MEDICINE CLINIC | Facility: CLINIC | Age: 64
End: 2024-09-12
Payer: COMMERCIAL

## 2024-09-12 VITALS
RESPIRATION RATE: 16 BRPM | OXYGEN SATURATION: 98 % | BODY MASS INDEX: 24.96 KG/M2 | DIASTOLIC BLOOD PRESSURE: 66 MMHG | WEIGHT: 159 LBS | TEMPERATURE: 98 F | SYSTOLIC BLOOD PRESSURE: 104 MMHG | HEART RATE: 61 BPM | HEIGHT: 67 IN

## 2024-09-12 DIAGNOSIS — U07.1 COVID: Primary | ICD-10-CM

## 2024-09-12 DIAGNOSIS — R05.9 COUGH, UNSPECIFIED TYPE: ICD-10-CM

## 2024-09-12 LAB
OPERATOR ID: ABNORMAL
RAPID SARS-COV-2 BY PCR: DETECTED

## 2024-09-12 PROCEDURE — U0002 COVID-19 LAB TEST NON-CDC: HCPCS | Performed by: PHYSICIAN ASSISTANT

## 2024-09-12 PROCEDURE — 99213 OFFICE O/P EST LOW 20 MIN: CPT | Performed by: PHYSICIAN ASSISTANT

## 2024-09-12 NOTE — PATIENT INSTRUCTIONS
When you may have a respiratory virus...?  Stay home and away from others (including people you live with who are not sick) if you have respiratory virus symptoms that aren't better explained by another cause. These symptoms can include fever, chills, fatigue, cough, runny nose, and headache, among others.    You can go back to your normal activities when, for at least 24 hours, both are true:  Your symptoms are getting better overall, and  2. You have not had a fever (and are not using fever-reducing medication).    When you go back to your normal activities, take added precaution over the next 5 days, such as taking additional steps for  air, hygiene, masks, physical distancing, and/or testing when you will be around other people indoors.    Keep in mind that you may still be able to spread the virus that made you sick, even if you are feeling better. You are likely to be less contagious at this time, depending on factors like how long you were sick or how sick you were.    If you develop a fever or you start to feel worse after you have gone back to normal activities, stay home and away from others again until, for at least 24 hours, both are true: your symptoms are improving overall, and you have not had a fever (and are not using fever-reducing medication). Then take added precaution for the next 5 days.

## 2024-09-12 NOTE — PROGRESS NOTES
CHIEF COMPLAINT:     Chief Complaint   Patient presents with    Sinus Problem     Sx 3 days - Sneezing  Sx yesterday - Sinus pressure, fever (H of 101 last night), swollen glands, dry cough, chills, bilat ear pressure (L is worse), PND  Denies chest congestion, ear pain, nasal congestion, SOB, chest pain, ST  No Covid test was done at home   OTC Tylenol and nasal rinse       HPI:   Mikayla Mondragon is a 63 year old female who presents for upper respiratory symptoms since yesterday. Did have sneezing for a few days prior but did not feel ill until yesterday. Patient reports sore throat only at the beginning of sx's, congestion, fever with Tmax to 101, dry cough, sinus pain, generalized headache.  Reports some greenish nasal discharge this morning but clear now.  Symptoms feel better today than yesterday since onset.  Treating symptoms with nasal saline and Tylenol.  Of note-- she had GI bleed in July 2024 and does not take NSAIDs now.      Current Outpatient Medications   Medication Sig Dispense Refill    pantoprazole 40 MG Oral Tab EC Take 1 tablet (40 mg total) by mouth 2 (two) times daily before meals. 180 tablet 3    ferrous sulfate 325 (65 FE) MG Oral Tab EC Take 1 tablet (325 mg total) by mouth 2 (two) times daily with meals.        Past Medical History:    Amenorrhea    Last period    Anxiety    Job threatened[place]-in [place]/fitness program    Chicken pox    Disorder of thyroid    Hemorrhoids    Dr. Lisette Coronado     History of hepatitis    Hypothyroidism    Lipid screening    Measles    Mumps    Viral hepatitis C      Past Surgical History:   Procedure Laterality Date    Colonoscopy  02/01/2008    Dr. Lisette Coronado, Repeat colonoscopy in five years. Family history of coloncancer     Colonoscopy N/A 03/24/2021    Procedure: COLONOSCOPY;  Surgeon: Nehemias Valdez MD;  Location: [facility] ENDO    Needle biopsy right      benign     Tonsillectomy  1982         Social History     Socioeconomic History     Marital status: Single   Tobacco Use    Smoking status: Never    Smokeless tobacco: Never   Vaping Use    Vaping status: Never Used   Substance and Sexual Activity    Alcohol use: Yes     Comment: 1 drink a month    Drug use: No    Sexual activity: Never   Other Topics Concern    Caffeine Concern No    Stress Concern No    Weight Concern No    Special Diet No    Exercise Yes    Seat Belt Yes     Social Determinants of Health     Food Insecurity: No Food Insecurity (7/4/2024)    Food Insecurity     Food Insecurity: Never true   Transportation Needs: No Transportation Needs (7/4/2024)    Transportation Needs     Lack of Transportation: No   Housing Stability: Low Risk  (7/4/2024)    Housing Stability     Housing Instability: No         REVIEW OF SYSTEMS:   GENERAL: ok appetite  SKIN: no rashes or abnormal skin lesions  HEENT: See HPI  LUNGS: See HPI  CARDIOVASCULAR: denies chest pain or palpitations   GI: denies N/V/C or abdominal pain      EXAM:   /66   Pulse 61   Temp 98.1 °F (36.7 °C)   Resp 16   Ht 5' 7\" (1.702 m)   Wt 159 lb (72.1 kg)   LMP 08/12/2013   SpO2 98%   Breastfeeding No   BMI 24.90 kg/m²   GENERAL: well developed, well nourished,in no apparent distress  SKIN: no rashes,no suspicious lesions  HEAD: atraumatic, normocephalic.  + tenderness on palpation of frontal sinuses  EYES: conjunctiva clear, EOM intact  EARS: TM's pearly, no bulging, no retraction,no fluid, bony landmarks visible  NOSE: Nostrils patent, minimal nasal discharge, nasal mucosa reddened   THROAT: Oral mucosa pink, moist. Posterior pharynx is minimally erythematous. No exudates. Tonsils 0/4.    NECK: Supple, non-tender  LUNGS: clear to auscultation bilaterally, no wheezes or rhonchi. Breathing is non labored.  CARDIO: RRR without murmur  EXTREMITIES: no cyanosis, clubbing or edema  LYMPH:  No cervical or submandibular lymphadenopathy.      Recent Results (from the past 24 hour(s))   COVID-19 LAB TEST NON-CDC    Collection  Time: 09/12/24 12:09 PM    Specimen: Nares   Result Value Ref Range    Rapid SARS-CoV-2 by PCR Detected (A) Not Detected    POCT Lot Number [id number]     POCT Expiration Date 02/28/2026     POCT Procedure Control Control Valid Control Valid     ID [id number]          ASSESSMENT AND PLAN:   Mikayla Mondragon is a 63 year old female who presents with upper respiratory symptoms that are consistent with    ASSESSMENT:   Encounter Diagnoses   Name Primary?    COVID Yes    Cough, unspecified type        PLAN:  Patient not interested in Paxlovid.  Discussed viral sinusitis from COVID. S/sx of secondary bacterial infection discussed.   Isolation recommendations discussed.     Fluids, rest, preferred OTC cold/cough remedy.    Comfort care as described in Patient Instructions    The patient indicates understanding of these issues and agrees to the plan.  The patient is asked to f/u with PCP if sx's persist or worsen.

## 2024-10-21 PROCEDURE — 88305 TISSUE EXAM BY PATHOLOGIST: CPT | Performed by: INTERNAL MEDICINE

## 2024-10-21 PROCEDURE — 88312 SPECIAL STAINS GROUP 1: CPT | Performed by: INTERNAL MEDICINE

## 2024-10-22 ENCOUNTER — TELEPHONE (OUTPATIENT)
Dept: GASTROENTEROLOGY | Facility: CLINIC | Age: 64
End: 2024-10-22

## 2024-10-22 DIAGNOSIS — K25.9 GASTRIC ULCER, UNSPECIFIED CHRONICITY, UNSPECIFIED WHETHER GASTRIC ULCER HEMORRHAGE OR PERFORATION PRESENT: Primary | ICD-10-CM

## 2024-10-22 NOTE — TELEPHONE ENCOUNTER
Called pt to go over the benign pathology from her EGD. She feels well and denies any complaints.     Given that she has a nonhealing gastric ulcer and the area around the ulcer appears significantly heaped up, recommend cross sectional imaging with a CT A/P with PO and IV contrast and an upper endoscopic ultrasound with Dr. Bermudez to r/o an extrinsic or submucoal process.  The tests/procedures were explained in detail and she expressed understanding.      EUS CONSENT  I have discussed the risks, benefits, and alternatives to EUS with the patient/primary decision maker [who demonstrated understanding], including but not limited to the risks of bleeding, infection, pain, death, as well as the risks of anesthesia and perforation all leading to prolonged hospital stay, surgical intervention, or even death. All questions were answered to the patient’s satisfaction. The patient signed informed consent and elected to proceed with EUS with intervention [i.e. FNA, biliary access, etc.] as indicated.     GI Staff:     Please schedule: EGD with upper endoscopic ultrasound with MAC with Dr. Bermudez in ~1 month.      Diagnosis: non-healing gastric ulcer     Medication adjustments: continue pantoprazole 40 mg BID until the procedure

## 2024-10-23 ENCOUNTER — TELEPHONE (OUTPATIENT)
Dept: GASTROENTEROLOGY | Facility: CLINIC | Age: 64
End: 2024-10-23

## 2024-10-23 NOTE — TELEPHONE ENCOUNTER
Scheduled for:  Colonoscopy [address]    Provider Name:   Aidan    Date:  11/25/2024    Location:    [facility]    Sedation:  MAC    Time:  9:30 am (Patient made aware [facility] will call the day before with procedure/arrival time)    Prep:  NPO after midnight    Meds/Allergies Reconciled?:  Physician reviewed     Diagnosis with codes:    Gastric ulcer, unspecified chronicity, unspecified whether gastric ulcer hemorrhage or perforation present [K25.9]     Was patient informed to call insurance with codes (Y/N):  Yes, I confirmed [employer] insurance with the patient.     Referral sent?:  N/A    EM or [facility] notified?:  I sent an electronic request to Endo Scheduling and received a confirmation today.      Medication Orders:  This patient verbally confirmed that she is not taking:   blood thinners, BP meds, and is not diabetic   Not latex allergy, Not PCN allergy and does not have a pacemaker     Misc Orders:  Hold Iron 7 days prior to procedure     Further instructions given by staff:   I discussed the prep instructions with the patient which she verbally understood and is aware that I will send the instructions today via [employer].    Advised patient:    You will not be able to drive, operate machinery or make critical decisions the day of your procedure. Please make arrangements for transportation. You must have a  (age 18 or older) to accompany you, drive you home after the procedure.  You cannot use public transportation (Uber, Lyft, Taxi). The procedure involves sedation, and you will not be allowed to leave unaccompanied. Your procedure will not proceed forward if you're unable to confirm your  planned to escort you home.

## 2024-10-23 NOTE — TELEPHONE ENCOUNTER
Called pt since CT hasn't been scheduled yet - recommend getting it done before the EGD/upper EUS.  Pt expressed understanding and is amenable with the plan. If there is a significant delay, will change the priority to STAT - she will let me know.    Mary Lou Donovan MD  [facility] Gastroenterology

## 2024-10-25 ENCOUNTER — HOSPITAL ENCOUNTER (OUTPATIENT)
Dept: MAMMOGRAPHY | Facility: HOSPITAL | Age: 64
Discharge: HOME | End: 2024-10-25
Attending: PHYSICIAN ASSISTANT
Payer: COMMERCIAL

## 2024-10-25 DIAGNOSIS — R92.30 DENSE BREASTS: ICD-10-CM

## 2024-10-25 DIAGNOSIS — Z80.3 FAMILY HISTORY OF BREAST CANCER IN SISTER: ICD-10-CM

## 2024-10-25 DIAGNOSIS — Z12.31 ENCOUNTER FOR SCREENING MAMMOGRAM FOR BREAST CANCER: ICD-10-CM

## 2024-10-25 DIAGNOSIS — Z12.31 ENCOUNTER FOR SCREENING MAMMOGRAM FOR BREAST CANCER: Primary | ICD-10-CM

## 2024-10-25 PROCEDURE — 77067 SCR MAMMO BI INCL CAD: CPT | Performed by: PHYSICIAN ASSISTANT

## 2024-10-25 PROCEDURE — 77063 BREAST TOMOSYNTHESIS BI: CPT | Performed by: PHYSICIAN ASSISTANT

## 2024-10-31 ENCOUNTER — TELEPHONE (OUTPATIENT)
Dept: CASE MANAGEMENT | Age: 64
End: 2024-10-31

## 2024-10-31 NOTE — TELEPHONE ENCOUNTER
Hello     We have attempted to reach patient regarding CT abdomen and pelvis. This request is still pending health plan. The health plan is wanting to discuss site with patient. Patient should reschedule appointment.     Status Of Case is PENDING  Attempted to reach out to patient by phone and/or Mychart.  Insurance will not cover without approval.  Patient should reschedule.        Please advise    Thank you,  Lynn  Referral specialist

## 2024-10-31 NOTE — TELEPHONE ENCOUNTER
Spoke to patient  Let her know that her authorization for the CT scan is still pending.  She said she attempted to reach out to [employer] today but was unable to get through.    Since the authorization is still pending, it was recommended to cancel the CT scan.    I let her know we will keep an eye out for the decision via fax.    Dr. Aidan WATTS   Patient stated you wanted a CT prior to her scheduled EUS on 11/25  We will work on this

## 2024-11-01 ENCOUNTER — HOSPITAL ENCOUNTER (OUTPATIENT)
Dept: CT IMAGING | Facility: HOSPITAL | Age: 64
End: 2024-11-01
Attending: INTERNAL MEDICINE
Payer: COMMERCIAL

## 2024-11-01 ENCOUNTER — HOSPITAL ENCOUNTER (OUTPATIENT)
Dept: CT IMAGING | Facility: HOSPITAL | Age: 64
Discharge: HOME | End: 2024-11-01
Attending: INTERNAL MEDICINE
Payer: COMMERCIAL

## 2024-11-01 DIAGNOSIS — K25.9 GASTRIC ULCER, UNSPECIFIED CHRONICITY, UNSPECIFIED WHETHER GASTRIC ULCER HEMORRHAGE OR PERFORATION PRESENT: ICD-10-CM

## 2024-11-01 LAB
CREAT BLD-MCNC: 1.1 MG/DL
EGFRCR SERPLBLD CKD-EPI 2021: 55 ML/MIN/1.73M2 (ref 60–?)

## 2024-11-01 PROCEDURE — 82565 ASSAY OF CREATININE: CPT

## 2024-11-01 PROCEDURE — 74177 CT ABD & PELVIS W/CONTRAST: CPT | Performed by: INTERNAL MEDICINE

## 2024-11-05 ENCOUNTER — TELEPHONE (OUTPATIENT)
Dept: GASTROENTEROLOGY | Facility: CLINIC | Age: 64
End: 2024-11-05

## 2024-11-05 NOTE — TELEPHONE ENCOUNTER
Can schedule next Monday 11/11    Thanks    MD [facility] - Gastroenterology  11/5/2024  4:02 PM

## 2024-11-05 NOTE — TELEPHONE ENCOUNTER
Attempted to call pt several times  re: CT results but no answer. Left vm to call back.     GI staff - please bubble chat me if she calls back so I can discuss this with her. She will need an EUS asap but I will discuss this with her. Thanks, SS   
Patient returning call, trying nurse line or call at [phone],thanks.   
Please refer to other TE from 11/5/2024    Patient spoke with dr. Donovan  
[employer] message sent  
5

## 2024-11-05 NOTE — TELEPHONE ENCOUNTER
Called pt to discuss CT findings, which shows a mass like lesion by the stomach and a lesion at the lateral aspect of the liver.  Case discussed with radiology.  We discussed that malignancy or GIST are possibilities and will try to expedite her upper EUS.  Case discussed with Dr. Bermudez as well, who is agreement in trying to expedite her upper EUS.      GI staff - hopefully this case can be expedited. She's currently scheduled for 11/25.  Thank you!

## 2024-11-11 ENCOUNTER — HOSPITAL ENCOUNTER (OUTPATIENT)
Facility: HOSPITAL | Age: 64
Setting detail: HOSPITAL OUTPATIENT SURGERY
Discharge: HOME | End: 2024-11-11
Attending: INTERNAL MEDICINE | Admitting: INTERNAL MEDICINE
Payer: COMMERCIAL

## 2024-11-11 ENCOUNTER — ANESTHESIA EVENT (OUTPATIENT)
Dept: ENDOSCOPY | Facility: HOSPITAL | Age: 64
End: 2024-11-11
Payer: COMMERCIAL

## 2024-11-11 ENCOUNTER — ANESTHESIA (OUTPATIENT)
Dept: ENDOSCOPY | Facility: HOSPITAL | Age: 64
End: 2024-11-11
Payer: COMMERCIAL

## 2024-11-11 VITALS
HEART RATE: 50 BPM | OXYGEN SATURATION: 100 % | RESPIRATION RATE: 12 BRPM | HEIGHT: 67 IN | WEIGHT: 153 LBS | DIASTOLIC BLOOD PRESSURE: 87 MMHG | BODY MASS INDEX: 24.01 KG/M2 | SYSTOLIC BLOOD PRESSURE: 124 MMHG

## 2024-11-11 DIAGNOSIS — K25.9 GASTRIC ULCER, UNSPECIFIED CHRONICITY, UNSPECIFIED WHETHER GASTRIC ULCER HEMORRHAGE OR PERFORATION PRESENT: ICD-10-CM

## 2024-11-11 PROBLEM — R93.5 ABNORMAL CT OF THE ABDOMEN: Status: ACTIVE | Noted: 2024-11-11

## 2024-11-11 PROCEDURE — 43238 EGD US FINE NEEDLE BX/ASPIR: CPT | Performed by: INTERNAL MEDICINE

## 2024-11-11 PROCEDURE — BD47ZZZ ULTRASONOGRAPHY OF GASTROINTESTINAL TRACT: ICD-10-PCS | Performed by: INTERNAL MEDICINE

## 2024-11-11 PROCEDURE — 0DB68ZX EXCISION OF STOMACH, VIA NATURAL OR ARTIFICIAL OPENING ENDOSCOPIC, DIAGNOSTIC: ICD-10-PCS | Performed by: INTERNAL MEDICINE

## 2024-11-11 RX ORDER — SODIUM CHLORIDE, SODIUM LACTATE, POTASSIUM CHLORIDE, CALCIUM CHLORIDE 600; 310; 30; 20 MG/100ML; MG/100ML; MG/100ML; MG/100ML
INJECTION, SOLUTION INTRAVENOUS CONTINUOUS
Status: DISCONTINUED | OUTPATIENT
Start: 2024-11-11 | End: 2024-11-11

## 2024-11-11 RX ORDER — LIDOCAINE HYDROCHLORIDE 10 MG/ML
INJECTION, SOLUTION EPIDURAL; INFILTRATION; INTRACAUDAL; PERINEURAL AS NEEDED
Status: DISCONTINUED | OUTPATIENT
Start: 2024-11-11 | End: 2024-11-11 | Stop reason: SURG

## 2024-11-11 RX ADMIN — SODIUM CHLORIDE, SODIUM LACTATE, POTASSIUM CHLORIDE, CALCIUM CHLORIDE: 600; 310; 30; 20 INJECTION, SOLUTION INTRAVENOUS at 15:44:00

## 2024-11-11 RX ADMIN — LIDOCAINE HYDROCHLORIDE 25 MG: 10 INJECTION, SOLUTION EPIDURAL; INFILTRATION; INTRACAUDAL; PERINEURAL at 15:05:00

## 2024-11-11 NOTE — ANESTHESIA PREPROCEDURE EVALUATION
Anesthesia PreOp Note    HPI:     Mikayla Mondragon is a 64 year old female who presents for preoperative consultation requested by: Clark Bermudez MD    Date of Surgery: 11/11/2024    Procedure(s):  ESOPHAGOGASTRODUODENOSCOPY  ENDOSCOPIC ULTRASOUND (EUS)  Indication: Gastric ulcer, unspecified chronicity, unspecified whether gastric ulcer hemorrhage or perforation present    Relevant Problems   No relevant active problems       NPO:                         History Review:  Patient Active Problem List    Diagnosis Date Noted    Acute blood loss anemia 07/05/2024    GI bleed 07/04/2024    Gastrointestinal hemorrhage, unspecified gastrointestinal hemorrhage type 07/04/2024    Anemia, unspecified type 07/04/2024    Dense breasts 05/01/2024    Family history of breast cancer in sister 05/01/2024    Hemorrhoids 11/21/2007       Past Medical History:    Amenorrhea    Last period    Anxiety    Job threatened[place]in [place]fitness program    Chicken pox    Disorder of thyroid    Hemorrhoids    Dr. Lisette Coronado     History of hepatitis    Hypothyroidism    Lipid screening    Measles    Mumps    Viral hepatitis C    Visual impairment       Past Surgical History:   Procedure Laterality Date    Colonoscopy  02/01/2008    Dr. Lisette Coronado, Repeat colonoscopy in five years. Family history of coloncancer     Colonoscopy N/A 03/24/2021    Procedure: COLONOSCOPY;  Surgeon: Nehemias Valdez MD;  Location: [facility] ENDO    Needle biopsy right      benign     Tonsillectomy  1982       Prescriptions Prior to Admission[1]  Current Medications and Prescriptions Ordered in Epic[2]    Allergies[3]    Family History   Problem Relation Age of Onset    Heart Disease Father         Coronary Artery Disease     Heart Surgery Father         Coronary artery bypass grafting     Heart Disorder Father         Triple bypass 1983, no complications since    Lipids Father         Hyperlipidemia     Dementia Father     Hypertension Mother      Psychiatric Mother         Severe anxiety 1920's, Shock therapy 1962    Other (Pschyologicals problems) Mother     Depression Mother     Cancer Paternal Grandmother         Colon cancer 1993    Cancer Paternal Grandfather         Brain cancer 1959    Cancer Sister     Breast Cancer Sister 57    Stroke Other         Grandmother     Colon Cancer Other         Family history of      Social History     Socioeconomic History    Marital status: Single   Tobacco Use    Smoking status: Never    Smokeless tobacco: Never   Vaping Use    Vaping status: Never Used   Substance and Sexual Activity    Alcohol use: Yes     Comment: 1 drink a month    Drug use: No    Sexual activity: Never   Other Topics Concern    Caffeine Concern No    Stress Concern No    Weight Concern No    Special Diet No    Exercise Yes    Seat Belt Yes       Available pre-op labs reviewed.  Lab Results   Component Value Date    WBC 6.7 08/27/2024    RBC 4.65 08/27/2024    HGB 14.6 08/27/2024    HCT 44.7 08/27/2024    MCV 96.1 08/27/2024    MCH 31.4 08/27/2024    MCHC 32.7 08/27/2024    RDW 12.2 08/27/2024    .0 08/27/2024             Vital Signs:  Body mass index is 23.96 kg/m².   height is 1.702 m (5' 7\") and weight is 69.4 kg (153 lb).   Vitals:    11/08/24 1158   Weight: 69.4 kg (153 lb)   Height: 1.702 m (5' 7\")        Anesthesia Evaluation     Patient summary reviewed    Airway   Mallampati: I  TM distance: >3 FB  Neck ROM: full  Dental - Dentition appears grossly intact     Pulmonary - normal exam   Cardiovascular - normal exam    ECG reviewed    Neuro/Psych    (+)  anxiety/panic attacks,        GI/Hepatic/Renal    (+) hepatitis C, liver disease    Endo/Other    Abdominal  - normal exam                 Anesthesia Plan:   ASA:  2  Plan:   MAC  Informed Consent Plan and Risks Discussed With:  Patient      I have informed Mikayla Mondragon and/or legal guardian or family member of the nature of the anesthetic plan, benefits, risks including possible  dental damage if relevant, major complications, and any alternative forms of anesthetic management.   All of the patient's questions were answered to the best of my ability. The patient desires the anesthetic management as planned.  Sakina Kent CRNA  11/11/2024 1:33 PM  Present on Admission:  **None**           [1]   Medications Prior to Admission   Medication Sig Dispense Refill Last Dose/Taking    pantoprazole 40 MG Oral Tab EC Take 1 tablet (40 mg total) by mouth 2 (two) times daily before meals. 180 tablet 3 Taking    ferrous sulfate 325 (65 FE) MG Oral Tab EC Take 1 tablet (325 mg total) by mouth 2 (two) times daily with meals.   Taking   [2]   Current Facility-Administered Medications Ordered in Epic   Medication Dose Route Frequency Provider Last Rate Last Admin    lactated ringers infusion   Intravenous Continuous Clark Bermudez MD         No current [facility]-ordered outpatient medications on file.   [3] No Known Allergies

## 2024-11-11 NOTE — OPERATIVE REPORT
Esophagogastroduodenoscopy (EGD) & Endoscopic Ultrasound (EUS) Report    Mikayla Mondragon     1960 Age 64 year old   PCP Misha Goodson MD Endoscopist Clark Bermudez MD     Date of procedure: 24    Procedure: EGD and EUS esophagus, stomach, duodenum with guided FNA/B    Pre-operative diagnosis:  gastric ulcer, abnormal CT abdomen     Patient of Dr. Donovan who underwent upper endoscopy in 2024 for melena and blood loss anemia found to have a gastric ulcer.  Surveillance upper endoscopy at the end of 2024 showed persistent small clean-based ulcer.  CT scan of the abdomen and pelvis from 2024 showed a 6.7 cm perigastric mass.  Here today for evaluation with EGD and EUS.    Post-operative diagnosis: see impression    Sedation: Monitored anesthesia care (MAC)    Consent: We discussed the risks/benefits and alternatives to this procedure, as well as the planned sedation.  Informed consent was obtained from the patient after the risks of the procedure were discussed, including but not limited to bleeding, perforation, aspiration, infection, or possibility of a missed lesion as well as the risks of anesthesia including but not limited to cardiopulmonary complications. The patient signed informed consent and elected to proceed with EGD/EUS with intervention [i.e. Biopsy, control of bleeding, dilatation, FNA, FNB, polypectomy, endoscopic mucosal resection, etc.] as indicated.     EGD & EUS procedure: The lubricated tip of the [id number] diagnostic video upper endoscope was carefully inserted and advanced using direct visualization into the posterior pharynx and ultimately into the esophagus. After the EGD was performed, the gastroscope was removed and the echoendoscope was then carefully inserted through the oropharynx, esophagus intubated, then advanced to the stomach and descending duodenum.    Air was then withdrawn and the echoendoscope was removed. The patient tolerated the procedure  well. There were no immediate postoperative complications. The patient’s vital signs were monitored throughout the procedure and remained stable.    Estimated blood loss: insignificant    Specimens collected:  gastric mass biopsies    Complications: none    EGD findings:    1. Esophagus: The squamocolumnar junction was noted and appeared regular. The esophageal mucosa appeared unremarkable.  2. Stomach:  Normal rugal folds were seen. The pylorus was patent. There was a large protruding subepithelial lesion along the posterior wall of the proximal stomach with a small central cavitated ulcer. There was an endoscopic hemoclip in place. The gastric mucosa appeared unremarkable. Retroflexion revealed a normal fundus and cardia.   3. Duodenum: The duodenal mucosa appeared normal in the 1st and 2nd portion of the duodenum.     EUS findings:  A radial echoendoscope was used initially.  Evaluation from the stomach showed a 6.4 cm x 5.9 cm hypoechoic perigastric mass.  A linear echoendoscope was then introduced.  From the stomach fine-needle biopsy was performed using a 22-gauge core needle with 3 passes performed and using doppler to avoid any intervening vessels.    Impression:  A 6.4 cm x 5.9 cm perigastric mass s/p fine needle biopsy    Recommend:  Await pathology results  Continue your current medications  Follow up with your primary care physician and primary gastroenterologist on a routine basis.     >>>If biopsies were performed and you have not received your pathology results either by phone or letter within 2 weeks, please call our office at [phone].     MD [facility] - Gastroenterology  11/11/2024

## 2024-11-11 NOTE — DISCHARGE INSTRUCTIONS
Home Care Instructions for Gastroscopy with Sedation    Diet:  - Resume your regular diet as tolerated unless otherwise instructed.  - Start with light meals to minimize bloating.  - Do not drink alcohol today.    Medication:  - If you have questions about resuming your normal medications, please contact your Primary Care Physician.    Activities:  - Take it easy today. Do not return to work today.  - Do not drive today.  - Do not operate any machinery today (including kitchen equipment).      Gastroscopy:  - You may have a sore throat for 2-3 days following the exam. This is normal. Gargling with warm salt water (1/2 tsp salt to 1 glass warm water) or using throat lozenges will help.  - If you experience any sharp pain in your neck, abdomen or chest, vomiting of blood, oral temperature over 100 degrees Fahrenheit, light-headedness or dizziness, or any other problems, contact your doctor.    **If unable to reach your doctor, please go to the [facility] Emergency Room**    - Your referring physician will receive a full report of your examination.  - If you do not hear from your doctor's office within two weeks of your biopsy, please call them for your results.    You may be able to see your laboratory results in [facility] between 4 and 7 business days.  In some cases, your physician may not have viewed the results before they are released to [employer].  If you have questions regarding your results contact the physician who ordered the test/exam by phone or via [facility] by choosing \"Ask a Medical Question.\"

## 2024-11-11 NOTE — H&P
History & Physical Examination    Patient Name: Mikayla Mondragon  MRN: [id number]  CSN: [id number]  YOB: 1960    Diagnosis: gastric ulcer, abnormal CT abdomen    Patient of Dr. Donovan who underwent upper endoscopy in July 2024 for melena and blood loss anemia found to have a gastric ulcer.  Surveillance upper endoscopy at the end of October 2024 showed persistent small clean-based ulcer.  CT scan of the abdomen and pelvis from 11/1/2024 showed a 6.7 cm perigastric mass.  Here today for evaluation with EGD and EUS.    Prescriptions Prior to Admission[1]  Current Facility-Administered Medications   Medication Dose Route Frequency    lactated ringers infusion   Intravenous Continuous       Allergies: Allergies[2]    Past Medical History:    Amenorrhea    Last period    Anxiety    Job threatened[place]fitness program    Chicken pox    Disorder of thyroid    Hemorrhoids    Dr. Lisette Coronado     History of hepatitis    Hypothyroidism    Lipid screening    Measles    Mumps    Viral hepatitis C    Visual impairment     Past Surgical History:   Procedure Laterality Date    Colonoscopy  02/01/2008    Dr. Lisette Coronado, Repeat colonoscopy in five years. Family history of coloncancer     Colonoscopy N/A 03/24/2021    Procedure: COLONOSCOPY;  Surgeon: Nehemias Valdez MD;  Location: [facility] ENDO    Needle biopsy right      benign     Tonsillectomy  1982     Family History   Problem Relation Age of Onset    Heart Disease Father         Coronary Artery Disease     Heart Surgery Father         Coronary artery bypass grafting     Heart Disorder Father         Triple bypass 1983, no complications since    Lipids Father         Hyperlipidemia     Dementia Father     Hypertension Mother     Psychiatric Mother         Severe anxiety 1920's, Shock therapy 1962    Other (Pschyologicals problems) Mother     Depression Mother     Cancer Paternal Grandmother         Colon cancer 1993    Cancer Paternal  Grandfather         Brain cancer 1959    Cancer Sister     Breast Cancer Sister 57    Stroke Other         Grandmother     Colon Cancer Other         Family history of      Social History     Tobacco Use    Smoking status: Never    Smokeless tobacco: Never   Substance Use Topics    Alcohol use: Not Currently     Comment: 1 drink a month       SYSTEM Check if Physical Exam is Normal If not normal, please explain:   HEENT [ X]    NECK  [ X]    HEART [ X]    LUNGS [ X]    ABDOMEN [ X]    EXTREMITIES [ X]      General:awake, cooperative, no acute distress  HEENT: EOMI, no scleral icterus, MMM; oral pharnyx is without exudates or lesions  Neck: no lymphadenopathy; thyroid is not enlarged and without nodules  CV: RRR  Resp: non-labored breathing  Abd: soft, non-tender, non-distended  Ext: no lower extremity swelling  Neuro: Alert, Oriented X 3  Skin: no rashes, bruises  Psych: normal affect  I have discussed the risks and benefits and alternatives of the procedure with the patient/family.  She understands and agreed to proceed with plan of care.   EGD Consent: I have discussed the risks, benefits, and alternatives to EGD with the patient [who demonstrated understanding], including but not limited to the risks of bleeding, infection, pain, perforation as well as the cardiopulmonary risks of anesthesia all leading to prolonged hospital stay or surgical intervention. All questions were answered to the patient’s satisfaction. The patient elected to proceed with EGD with intervention [i.e. Biopsy, dilatation, control of bleeding, etc.] as indicated.  EUS Consent: I have discussed the risks, benefits, and alternatives to EUS with the patient [who demonstrated understanding], including but not limited to the risks of bleeding, infection, pain, pancreatitis, perforation, missed lesions as well as the cardiopulmonary risks of anesthesia all leading to prolonged hospital stay or surgical intervention. All questions were answered to  the patient’s satisfaction. The patient elected to proceed with EUS with intervention [i.e. FNA, FNB, etc.] as indicated.  Clark Bermudez MD  [facility] - Gastroenterology  11/11/2024  2:58 PM                   [1]   Medications Prior to Admission   Medication Sig Dispense Refill Last Dose/Taking    pantoprazole 40 MG Oral Tab EC Take 1 tablet (40 mg total) by mouth 2 (two) times daily before meals. 180 tablet 3 11/4/2024    ferrous sulfate 325 (65 FE) MG Oral Tab EC Take 1 tablet (325 mg total) by mouth 2 (two) times daily with meals.   11/4/2024   [2] No Known Allergies

## 2024-11-11 NOTE — ANESTHESIA POSTPROCEDURE EVALUATION
Patient: Mikayla Mondragon    Procedure Summary       Date: 11/11/24 Room / Location: [facility] ENDOSCOPY 01 / [facility] ENDOSCOPY    Anesthesia Start: 1500 Anesthesia Stop:     Procedures:       ESOPHAGOGASTRODUODENOSCOPY      ENDOSCOPIC ULTRASOUND (EUS) Diagnosis:       Gastric ulcer, unspecified chronicity, unspecified whether gastric ulcer hemorrhage or perforation present      (Gastric ulcer, unspecified chronicity, unspecified whether gastric ulcer hemorrhage or perforation present)    Surgeons: Clark Bermudez MD Anesthesiologist: Sakina Kent CRNA    Anesthesia Type: MAC ASA Status: 2            Anesthesia Type: MAC    Vitals Value Taken Time   /65 11/11/24 1544   Temp 97F 11/11/24 1544   Pulse 51 11/11/24 1544   Resp 12 11/11/24 1544   SpO2 100% 11/11/24 1544       [facility] AN Post Evaluation:   Patient Evaluated in PACU  Patient Participation: complete - patient participated  Level of Consciousness: awake and alert  Pain Score: 0  Pain Management: adequate  Airway Patency:patent  Yes    Nausea/Vomiting: none  Cardiovascular Status: acceptable  Respiratory Status: acceptable  Postoperative Hydration acceptable      Sakina Kent CRNA  11/11/2024 3:44 PM

## 2024-11-13 ENCOUNTER — TELEPHONE (OUTPATIENT)
Dept: GASTROENTEROLOGY | Facility: CLINIC | Age: 64
End: 2024-11-13

## 2024-11-13 NOTE — TELEPHONE ENCOUNTER
I called and spoke with the patient and discussed path results of this being a GIST. Discussed recommendation for surgical oncology evaluation to discuss next steps whether that be surgery and removal of the liver lesion or further of the liver lesion. All questions answered to best of my abilities and she was appreciative of the call.    STEFANIE Barreto and Dr. Goodson and MD [facility] - Gastroenterology  11/13/2024  12:35 PM

## 2024-11-14 ENCOUNTER — PATIENT MESSAGE (OUTPATIENT)
Dept: GASTROENTEROLOGY | Facility: CLINIC | Age: 64
End: 2024-11-14

## 2024-11-14 ENCOUNTER — TELEPHONE (OUTPATIENT)
Dept: GASTROENTEROLOGY | Facility: CLINIC | Age: 64
End: 2024-11-14

## 2024-11-14 NOTE — TELEPHONE ENCOUNTER
Called pt back and answered her questions to her satisfaction. Will await recs from Dr. Dickinson.

## 2024-11-19 ENCOUNTER — OFFICE VISIT (OUTPATIENT)
Age: 64
End: 2024-11-19
Payer: COMMERCIAL

## 2024-11-19 VITALS
BODY MASS INDEX: 25.46 KG/M2 | WEIGHT: 162.19 LBS | HEIGHT: 67 IN | DIASTOLIC BLOOD PRESSURE: 74 MMHG | HEART RATE: 67 BPM | OXYGEN SATURATION: 98 % | TEMPERATURE: 98 F | SYSTOLIC BLOOD PRESSURE: 124 MMHG | RESPIRATION RATE: 16 BRPM

## 2024-11-19 DIAGNOSIS — C49.A2 MALIGNANT GASTROINTESTINAL STROMAL TUMOR (GIST) OF STOMACH (HCC): Primary | ICD-10-CM

## 2024-11-19 PROCEDURE — 99205 OFFICE O/P NEW HI 60 MIN: CPT | Performed by: SURGERY

## 2024-11-19 NOTE — PATIENT INSTRUCTIONS
Surgery: Xi Robot-assisted, laparoscopic, possible open, partial gastrectomy, possible distal gastrectomy with B2 reconstruction     Date of Surgery: TBD    Surgery Length: 3 hours    Anesthesia: [facility]    Hospital:    [facility]- [address], [place] [address]   Phone: [phone]. Pre-Admission [phone]    This is an inpatient procedure.  Use the provided Chlorhexadine surgical soap(instructions attached) to shower the night before and morning of your procedure.  Do not apply powders, creams, lotions or deodorant after showering.  Do not apply any kind of makeup and make sure to remove nail polish prior to your surgery.  For faster recovery from anesthesia and surgery please follow the instructions below regarding your pre-op diet:  12 hours prior to your surgery time you are to drink one 10oz bottle of Ensure Pre-Surgery Drink. You are to have NO solid food or water after 11pm the night before your surgery EXCEPT one additional 10oz bottle of Ensure Pre-Surgery Drink. You need to finish drinking this 4 hours prior to surgery time.  Bring your picture ID and insurance card with you.  Wear comfortable clothing that can easily be removed. Preferably, something that zips, snaps, or buttons up the front.   You will be contacted by the hospital the day prior to your surgery to confirm details and give you specific instructions about when and where to arrive the day of your procedure.   If you are taking blood thinners including: Plavix, Eliquis, Coumadin you will need to contact the prescribing provider for specific instructions on holding these medications for your procedure  Motrin, Advil, Ibuprofen, Aspirin, Baby Aspirin and Fish Oil are also blood thinners and need to be held at least one week prior to your procedure. It is okay to take Tylenol.  Inform your primary care physician of your surgery and ask if him/her will need to see you prior to surgery.      Pre-Operative  Testing  x CBC x CMP  BMP    PT, PTT, INR  UA x EKG    Chest X-Ray  Cardiac Clearance x [facility] Clearance     Does patient have pacemaker: [] YES [x] No Does patient have JOCE:  [] YES [x] No  Is patient diabetic?  [] YES    [x] NO    Please call PCP/specialty physician to schedule pre-op exam for medical clearance needed 7 days prior to surgery.     Demetrio Dickinson M.D.  Complex General Surgical Oncology  [facility]     For Dr. Dickinson's office: [phone]/ Fax: [phone]  After hours you will reach the answering service    Dr. Dickinson's nurse; Cristal RN:  [phone]  Monday through Friday 8:00am to 4:30pm.     Central [phone]  Medical Records:   [phone]

## 2024-11-20 ENCOUNTER — DOCUMENTATION ONLY (OUTPATIENT)
Dept: SURGERY | Facility: CLINIC | Age: 64
End: 2024-11-20

## 2024-11-21 ENCOUNTER — TELEPHONE (OUTPATIENT)
Dept: SURGERY | Facility: CLINIC | Age: 64
End: 2024-11-21

## 2024-11-21 DIAGNOSIS — C49.A2 MALIGNANT GASTROINTESTINAL STROMAL TUMOR (GIST) OF STOMACH (HCC): Primary | ICD-10-CM

## 2024-11-21 NOTE — PROGRESS NOTES
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     Procedure: Xi Robot-assisted, laparoscopic, possible open, partial gastrectomy, possible distal gastrectomy with B2 reconstruction     Location: [x] [place] [facility]  [] [facility]  [] Endo Lab    Type: [x] Inpatient  [] Outpatient  [] 23-hour observation    Number of days inpatient: 3 days    Length of Surgery: 3 hours    Anesthesia: [] Local  [] MAC [x] General  [] Pec Block     Joint case with: [] Yes,   [x] No   Needs SA:  [x] Yes  [] No    Position/Special Equipment:    Penicillin Allergy: [] Yes [x] No   Nickel Allergy: [] Yes [x] No   Latex Allergy: [] Yes [x] No    Orders:  Colon Bundle/Bowel Prep: [] Yes  [x] No    Type and Cross 2 units PRBC: [x] Yes [] No    Type and Screen: [x] Yes [] No    Advanced Oncology Order Set (HIPEC): [] Yes [x] No    Heparin Pre-op (Heparin 5000 units subQ x 1 dose): [x] Yes  [] No    Nuclear Med Injection: [] Yes  [x] No    Wire localization needed: [] Yes [x] No    Midline placement (HIPEC,Whipple, Esophagectomy, Liver): [x] Yes [] No    CHG Cloths (HIPEC, Whipple, Esophagectomy, Liver): [x] Yes [] No    Tylenol administration prior to surgery: [x] Yes [] No    Does patient have a pacemaker: [] Yes [x] No  Sleep Apnea: [] Yes  [x] No      Is patient diabetic: [] Yes, if so, no Ensure/yes to Sugar free Gatorade [x] No    Antibiotics: [x] /EM prophylactic antibiotic protocol [] No need of antibiotics    PAT Orders: [x]CBC  [x]CMP [x]EKG  []CT Scan []Chest X-ray

## 2024-11-26 ENCOUNTER — HOSPITAL ENCOUNTER (OUTPATIENT)
Dept: NUCLEAR MEDICINE | Facility: HOSPITAL | Age: 64
Discharge: HOME | End: 2024-11-26
Attending: SURGERY
Payer: COMMERCIAL

## 2024-11-26 DIAGNOSIS — C49.A2 MALIGNANT GASTROINTESTINAL STROMAL TUMOR (GIST) OF STOMACH (HCC): ICD-10-CM

## 2024-11-26 LAB — GLUCOSE BLDC GLUCOMTR-MCNC: 99 MG/DL (ref 70–99)

## 2024-11-26 PROCEDURE — 78815 PET IMAGE W/CT SKULL-THIGH: CPT | Performed by: SURGERY

## 2024-11-26 PROCEDURE — 82962 GLUCOSE BLOOD TEST: CPT

## 2024-11-28 ENCOUNTER — PATIENT MESSAGE (OUTPATIENT)
Age: 64
End: 2024-11-28

## 2024-12-05 NOTE — PAT NURSING NOTE
Message left recording with Cristal re: Midline Placement if Preop, can not be 1st case and would need to place order and call PICC Line RN to schedule placement. Awaiting response.

## 2024-12-05 NOTE — PAT NURSING NOTE
Per Cristal RN with Dr Dickinson, Midline Placement to be done preop.     Eri in surgery scheduling notified of need for Midline placement preop. Surgery can not be 1st case. Verbalized understanding.

## 2024-12-09 ENCOUNTER — OFFICE VISIT (OUTPATIENT)
Dept: FAMILY MEDICINE CLINIC | Facility: CLINIC | Age: 64
End: 2024-12-09
Payer: COMMERCIAL

## 2024-12-09 ENCOUNTER — LAB ENCOUNTER (OUTPATIENT)
Dept: LAB | Age: 64
End: 2024-12-09
Attending: PHYSICIAN ASSISTANT
Payer: COMMERCIAL

## 2024-12-09 ENCOUNTER — EKG ENCOUNTER (OUTPATIENT)
Dept: LAB | Age: 64
End: 2024-12-09
Attending: PHYSICIAN ASSISTANT
Payer: COMMERCIAL

## 2024-12-09 VITALS
RESPIRATION RATE: 16 BRPM | HEIGHT: 67 IN | WEIGHT: 160 LBS | OXYGEN SATURATION: 97 % | HEART RATE: 60 BPM | BODY MASS INDEX: 25.11 KG/M2 | DIASTOLIC BLOOD PRESSURE: 68 MMHG | SYSTOLIC BLOOD PRESSURE: 112 MMHG

## 2024-12-09 DIAGNOSIS — Z01.818 PREOPERATIVE CLEARANCE: ICD-10-CM

## 2024-12-09 DIAGNOSIS — Z01.818 PREOPERATIVE CLEARANCE: Primary | ICD-10-CM

## 2024-12-09 DIAGNOSIS — D62 ACUTE BLOOD LOSS ANEMIA: ICD-10-CM

## 2024-12-09 DIAGNOSIS — D49.0 GASTRIC TUMOR: ICD-10-CM

## 2024-12-09 LAB
ALBUMIN SERPL-MCNC: 4.1 G/DL (ref 3.2–4.8)
ALBUMIN/GLOB SERPL: 1.5 {RATIO} (ref 1–2)
ALP LIVER SERPL-CCNC: 38 U/L
ALT SERPL-CCNC: 29 U/L
ANION GAP SERPL CALC-SCNC: 7 MMOL/L (ref 0–18)
AST SERPL-CCNC: 21 U/L (ref ?–34)
ATRIAL RATE: 48 BPM
BASOPHILS # BLD AUTO: 0.07 X10(3) UL (ref 0–0.2)
BASOPHILS NFR BLD AUTO: 1.3 %
BILIRUB SERPL-MCNC: 0.5 MG/DL (ref 0.2–1.1)
BUN BLD-MCNC: 17 MG/DL (ref 9–23)
CALCIUM BLD-MCNC: 10.4 MG/DL (ref 8.7–10.4)
CHLORIDE SERPL-SCNC: 110 MMOL/L (ref 98–112)
CO2 SERPL-SCNC: 25 MMOL/L (ref 21–32)
CREAT BLD-MCNC: 1 MG/DL
EGFRCR SERPLBLD CKD-EPI 2021: 62 ML/MIN/1.73M2 (ref 60–?)
EOSINOPHIL # BLD AUTO: 0.19 X10(3) UL (ref 0–0.7)
EOSINOPHIL NFR BLD AUTO: 3.5 %
ERYTHROCYTE [DISTWIDTH] IN BLOOD BY AUTOMATED COUNT: 14.7 %
GLOBULIN PLAS-MCNC: 2.7 G/DL (ref 2–3.5)
GLUCOSE BLD-MCNC: 99 MG/DL (ref 70–99)
HCT VFR BLD AUTO: 41.1 %
HGB BLD-MCNC: 13.1 G/DL
IMM GRANULOCYTES # BLD AUTO: 0.02 X10(3) UL (ref 0–1)
IMM GRANULOCYTES NFR BLD: 0.4 %
LYMPHOCYTES # BLD AUTO: 1.91 X10(3) UL (ref 1–4)
LYMPHOCYTES NFR BLD AUTO: 34.9 %
MCH RBC QN AUTO: 30.4 PG (ref 26–34)
MCHC RBC AUTO-ENTMCNC: 31.9 G/DL (ref 31–37)
MCV RBC AUTO: 95.4 FL
MONOCYTES # BLD AUTO: 0.37 X10(3) UL (ref 0.1–1)
MONOCYTES NFR BLD AUTO: 6.8 %
NEUTROPHILS # BLD AUTO: 2.91 X10 (3) UL (ref 1.5–7.7)
NEUTROPHILS # BLD AUTO: 2.91 X10(3) UL (ref 1.5–7.7)
NEUTROPHILS NFR BLD AUTO: 53.1 %
OSMOLALITY SERPL CALC.SUM OF ELEC: 296 MOSM/KG (ref 275–295)
P AXIS: 55 DEGREES
P-R INTERVAL: 120 MS
PLATELET # BLD AUTO: 164 10(3)UL (ref 150–450)
POTASSIUM SERPL-SCNC: 5.2 MMOL/L (ref 3.5–5.1)
PROT SERPL-MCNC: 6.8 G/DL (ref 5.7–8.2)
Q-T INTERVAL: 444 MS
QRS DURATION: 76 MS
QTC CALCULATION (BEZET): 396 MS
R AXIS: 28 DEGREES
RBC # BLD AUTO: 4.31 X10(6)UL
SODIUM SERPL-SCNC: 142 MMOL/L (ref 136–145)
T AXIS: 39 DEGREES
VENTRICULAR RATE: 48 BPM
WBC # BLD AUTO: 5.5 X10(3) UL (ref 4–11)

## 2024-12-09 PROCEDURE — 85025 COMPLETE CBC W/AUTO DIFF WBC: CPT

## 2024-12-09 PROCEDURE — 36415 COLL VENOUS BLD VENIPUNCTURE: CPT

## 2024-12-09 PROCEDURE — 99214 OFFICE O/P EST MOD 30 MIN: CPT | Performed by: PHYSICIAN ASSISTANT

## 2024-12-09 PROCEDURE — 80053 COMPREHEN METABOLIC PANEL: CPT

## 2024-12-09 PROCEDURE — 93005 ELECTROCARDIOGRAM TRACING: CPT

## 2024-12-09 PROCEDURE — 93010 ELECTROCARDIOGRAM REPORT: CPT | Performed by: INTERNAL MEDICINE

## 2024-12-09 NOTE — PROGRESS NOTES
Subjective:   Patient ID: Mikayla Mondragon is a 64 year old female.    HPI  Patient presents for preop exam.    Surgery: Xi Robot-assisted, laparoscopic, possible open, partial gastrectomy, possible distal gastrectomy with B2 reconstruction   Date: 12/16/2024   Surgeon: Dr. Demetrio Dickinson   Location: [facility] Main OR   Anesthesia: General     H/o: gastric mass   Imaging: CT 11/1/2024 primary gastric malignancy with central ulceration or a gyn gastric ulcer with contained hematoma, no CT evidence of gastric perforation     Diet: well balanced   Exercise: regular   Tobacco use: denies   Drug use: denies   Alcohol use: denies     NSAIDs/Antiplatelets/Anticoagulation: denies use   Vitamins/supplements: reports stopped taking   Personal or family history of heart attack/stroke: denies   Personal or family history of complications from anesthesia: denies      Post-op she will be staying with her brother      History/Other:   Review of Systems   Constitutional:  Negative for chills, fatigue and fever.   HENT:  Negative for congestion, ear pain, postnasal drip, rhinorrhea, sinus pain, sneezing, sore throat and trouble swallowing.    Eyes:  Negative for photophobia, pain, redness and visual disturbance.   Respiratory:  Negative for cough, choking, chest tightness, shortness of breath and wheezing.    Cardiovascular:  Negative for chest pain, palpitations and leg swelling.   Gastrointestinal:  Negative for abdominal pain, constipation, diarrhea, nausea and vomiting.   Endocrine: Negative for polydipsia, polyphagia and polyuria.   Genitourinary:  Negative for difficulty urinating, dysuria, frequency, hematuria and urgency.   Musculoskeletal:  Negative for arthralgias, gait problem, joint swelling and myalgias.   Skin:  Negative for color change, rash and wound.   Neurological:  Negative for tremors, syncope, weakness, light-headedness and headaches.   Hematological:  Negative for adenopathy.   Psychiatric/Behavioral:  Negative for  agitation, behavioral problems, confusion and dysphoric mood. The patient is not nervous/anxious and is not hyperactive.      Current Outpatient Medications   Medication Sig Dispense Refill    pantoprazole 40 MG Oral Tab EC Take 1 tablet (40 mg total) by mouth 2 (two) times daily before meals. 180 tablet 3    ferrous sulfate 325 (65 FE) MG Oral Tab EC Take 1 tablet (325 mg total) by mouth 2 (two) times daily with meals.       Allergies:Allergies[1]    Objective:   Physical Exam  Vitals and nursing note reviewed.   Constitutional:       General: She is not in acute distress.     Appearance: Normal appearance. She is well-developed.   HENT:      Head: Normocephalic and atraumatic.      Right Ear: Hearing, tympanic membrane, ear canal and external ear normal.      Left Ear: Hearing, tympanic membrane, ear canal and external ear normal.      Nose: Nose normal.      Mouth/Throat:      Mouth: Mucous membranes are moist.      Pharynx: Oropharynx is clear. No oropharyngeal exudate, posterior oropharyngeal erythema or postnasal drip.   Eyes:      Extraocular Movements: Extraocular movements intact.      Conjunctiva/sclera: Conjunctivae normal.      Pupils: Pupils are equal, round, and reactive to light.   Neck:      Thyroid: No thyromegaly.   Cardiovascular:      Rate and Rhythm: Normal rate and regular rhythm.      Pulses: Normal pulses.      Heart sounds: Normal heart sounds. No murmur heard.     No gallop.   Pulmonary:      Effort: Pulmonary effort is normal.      Breath sounds: Normal breath sounds. No wheezing, rhonchi or rales.   Abdominal:      General: Bowel sounds are normal. There is no distension.      Palpations: Abdomen is soft. There is no mass.      Tenderness: There is no abdominal tenderness.   Musculoskeletal:         General: No tenderness. Normal range of motion.      Cervical back: Normal range of motion and neck supple.   Lymphadenopathy:      Cervical: No cervical adenopathy.   Skin:     General: Skin  is warm and dry.      Findings: No lesion or rash.   Neurological:      General: No focal deficit present.      Mental Status: She is alert and oriented to person, place, and time.   Psychiatric:         Attention and Perception: Attention and perception normal.         Mood and Affect: Mood and affect normal.         Speech: Speech normal.         Behavior: Behavior normal. Behavior is cooperative.         Thought Content: Thought content normal.         Cognition and Memory: Cognition and memory normal.         Judgment: Judgment normal.         Assessment & Plan:   1. Preoperative clearance  Physical exam is unremarkable. Pre-op labs and EKG ordered. Patient is medically stable for surgery pending labs. Advised to hold all NSAIDs and vitamins from now until procedure. Contact the office if symptoms of a cough, urinary infection, or skin infection develop prior to procedure. Follow-up with PCP 1-2 weeks after procedure.  - CBC W Differential W Platelet [E]; Future  - Comp Metabolic Panel (14) [E]; Future  - EKG 12 Lead; Future    2. Gastric tumor  Planned surgery as above.     3. Acute blood loss anemia  Follow up with labs post op.                [1] No Known Allergies

## 2024-12-09 NOTE — TELEPHONE ENCOUNTER
Spoke to patient regarding message received regarding letter received from insurance company. Confirmed surgery plan as discussed in clinic. Informed patient that [facility] authorization department working with her insurance department for surgery approval. All questions answered, advised to call back for any future questions or concerns. Patient verbalized understanding.

## 2024-12-10 ENCOUNTER — LAB ENCOUNTER (OUTPATIENT)
Dept: LAB | Facility: HOSPITAL | Age: 64
End: 2024-12-10
Attending: SURGERY
Payer: COMMERCIAL

## 2024-12-10 DIAGNOSIS — Z01.818 PRE-OP TESTING: ICD-10-CM

## 2024-12-10 LAB
ANTIBODY SCREEN: NEGATIVE
RH BLOOD TYPE: NEGATIVE

## 2024-12-10 PROCEDURE — 86901 BLOOD TYPING SEROLOGIC RH(D): CPT

## 2024-12-10 PROCEDURE — 86900 BLOOD TYPING SEROLOGIC ABO: CPT

## 2024-12-10 PROCEDURE — 86850 RBC ANTIBODY SCREEN: CPT

## 2024-12-10 PROCEDURE — 36415 COLL VENOUS BLD VENIPUNCTURE: CPT

## 2024-12-13 RX ORDER — SODIUM CHLORIDE, SODIUM LACTATE, POTASSIUM CHLORIDE, CALCIUM CHLORIDE 600; 310; 30; 20 MG/100ML; MG/100ML; MG/100ML; MG/100ML
INJECTION, SOLUTION INTRAVENOUS CONTINUOUS
Status: DISCONTINUED | OUTPATIENT
Start: 2024-12-13 | End: 2024-12-13

## 2024-12-16 ENCOUNTER — ANESTHESIA (OUTPATIENT)
Dept: SURGERY | Facility: HOSPITAL | Age: 64
End: 2024-12-16
Payer: COMMERCIAL

## 2024-12-16 ENCOUNTER — ANESTHESIA EVENT (OUTPATIENT)
Dept: SURGERY | Facility: HOSPITAL | Age: 64
End: 2024-12-16
Payer: COMMERCIAL

## 2024-12-16 ENCOUNTER — HOSPITAL ENCOUNTER (INPATIENT)
Facility: HOSPITAL | Age: 64
LOS: 3 days | Discharge: HOME | End: 2024-12-19
Attending: SURGERY | Admitting: SURGERY
Payer: COMMERCIAL

## 2024-12-16 ENCOUNTER — APPOINTMENT (OUTPATIENT)
Dept: PICC SERVICES | Facility: HOSPITAL | Age: 64
End: 2024-12-16
Attending: SURGERY
Payer: COMMERCIAL

## 2024-12-16 DIAGNOSIS — C49.A0 GASTROINTESTINAL STROMAL TUMOR (GIST) (HCC): ICD-10-CM

## 2024-12-16 DIAGNOSIS — C49.A2 MALIGNANT GASTROINTESTINAL STROMAL TUMOR (GIST) OF STOMACH (HCC): ICD-10-CM

## 2024-12-16 DIAGNOSIS — D21.4 BENIGN GASTROINTESTINAL STROMAL TUMOR (GIST): ICD-10-CM

## 2024-12-16 DIAGNOSIS — Z01.818 PRE-OP TESTING: Primary | ICD-10-CM

## 2024-12-16 PROCEDURE — 0DJ08ZZ INSPECTION OF UPPER INTESTINAL TRACT, VIA NATURAL OR ARTIFICIAL OPENING ENDOSCOPIC: ICD-10-PCS | Performed by: SURGERY

## 2024-12-16 PROCEDURE — 0DB64ZZ EXCISION OF STOMACH, PERCUTANEOUS ENDOSCOPIC APPROACH: ICD-10-PCS | Performed by: SURGERY

## 2024-12-16 PROCEDURE — 8E0W4CZ ROBOTIC ASSISTED PROCEDURE OF TRUNK REGION, PERCUTANEOUS ENDOSCOPIC APPROACH: ICD-10-PCS | Performed by: SURGERY

## 2024-12-16 PROCEDURE — 99222 1ST HOSP IP/OBS MODERATE 55: CPT | Performed by: HOSPITALIST

## 2024-12-16 RX ORDER — MAGNESIUM HYDROXIDE 1200 MG/15ML
LIQUID ORAL CONTINUOUS PRN
Status: DISCONTINUED | OUTPATIENT
Start: 2024-12-16 | End: 2024-12-16 | Stop reason: HOSPADM

## 2024-12-16 RX ORDER — FAMOTIDINE 10 MG/ML
20 INJECTION, SOLUTION INTRAVENOUS 2 TIMES DAILY
Status: DISCONTINUED | OUTPATIENT
Start: 2024-12-16 | End: 2024-12-19

## 2024-12-16 RX ORDER — EPHEDRINE SULFATE 50 MG/ML
INJECTION, SOLUTION INTRAVENOUS AS NEEDED
Status: DISCONTINUED | OUTPATIENT
Start: 2024-12-16 | End: 2024-12-16 | Stop reason: SURG

## 2024-12-16 RX ORDER — ONDANSETRON 2 MG/ML
INJECTION INTRAMUSCULAR; INTRAVENOUS AS NEEDED
Status: DISCONTINUED | OUTPATIENT
Start: 2024-12-16 | End: 2024-12-16 | Stop reason: SURG

## 2024-12-16 RX ORDER — FAMOTIDINE 20 MG/1
20 TABLET, FILM COATED ORAL 2 TIMES DAILY
Status: DISCONTINUED | OUTPATIENT
Start: 2024-12-16 | End: 2024-12-19

## 2024-12-16 RX ORDER — OXYCODONE HYDROCHLORIDE 5 MG/1
5 TABLET ORAL EVERY 4 HOURS PRN
Status: DISCONTINUED | OUTPATIENT
Start: 2024-12-16 | End: 2024-12-19

## 2024-12-16 RX ORDER — MORPHINE SULFATE 10 MG/ML
6 INJECTION, SOLUTION INTRAMUSCULAR; INTRAVENOUS EVERY 10 MIN PRN
Status: DISCONTINUED | OUTPATIENT
Start: 2024-12-16 | End: 2024-12-16 | Stop reason: HOSPADM

## 2024-12-16 RX ORDER — POLYETHYLENE GLYCOL 3350 17 G/17G
17 POWDER, FOR SOLUTION ORAL DAILY PRN
Status: DISCONTINUED | OUTPATIENT
Start: 2024-12-16 | End: 2024-12-19

## 2024-12-16 RX ORDER — MORPHINE SULFATE 4 MG/ML
4 INJECTION, SOLUTION INTRAMUSCULAR; INTRAVENOUS EVERY 10 MIN PRN
Status: DISCONTINUED | OUTPATIENT
Start: 2024-12-16 | End: 2024-12-16 | Stop reason: HOSPADM

## 2024-12-16 RX ORDER — SODIUM CHLORIDE, SODIUM LACTATE, POTASSIUM CHLORIDE, CALCIUM CHLORIDE 600; 310; 30; 20 MG/100ML; MG/100ML; MG/100ML; MG/100ML
2 INJECTION, SOLUTION INTRAVENOUS CONTINUOUS
Status: DISCONTINUED | OUTPATIENT
Start: 2024-12-16 | End: 2024-12-19

## 2024-12-16 RX ORDER — NALOXONE HYDROCHLORIDE 0.4 MG/ML
0.08 INJECTION, SOLUTION INTRAMUSCULAR; INTRAVENOUS; SUBCUTANEOUS AS NEEDED
Status: DISCONTINUED | OUTPATIENT
Start: 2024-12-16 | End: 2024-12-16 | Stop reason: HOSPADM

## 2024-12-16 RX ORDER — HYDROMORPHONE HYDROCHLORIDE 1 MG/ML
0.6 INJECTION, SOLUTION INTRAMUSCULAR; INTRAVENOUS; SUBCUTANEOUS EVERY 5 MIN PRN
Status: DISCONTINUED | OUTPATIENT
Start: 2024-12-16 | End: 2024-12-16 | Stop reason: HOSPADM

## 2024-12-16 RX ORDER — ACETAMINOPHEN 10 MG/ML
1000 INJECTION, SOLUTION INTRAVENOUS ONCE
Status: COMPLETED | OUTPATIENT
Start: 2024-12-16 | End: 2024-12-16

## 2024-12-16 RX ORDER — HYDROMORPHONE HYDROCHLORIDE 1 MG/ML
0.2 INJECTION, SOLUTION INTRAMUSCULAR; INTRAVENOUS; SUBCUTANEOUS EVERY 2 HOUR PRN
Status: DISCONTINUED | OUTPATIENT
Start: 2024-12-16 | End: 2024-12-19

## 2024-12-16 RX ORDER — HEPARIN SODIUM 5000 [USP'U]/ML
5000 INJECTION, SOLUTION INTRAVENOUS; SUBCUTANEOUS EVERY 8 HOURS SCHEDULED
Status: DISCONTINUED | OUTPATIENT
Start: 2024-12-17 | End: 2024-12-19

## 2024-12-16 RX ORDER — OXYCODONE HYDROCHLORIDE 5 MG/1
2.5 TABLET ORAL EVERY 4 HOURS PRN
Status: DISCONTINUED | OUTPATIENT
Start: 2024-12-16 | End: 2024-12-19

## 2024-12-16 RX ORDER — ONDANSETRON 2 MG/ML
4 INJECTION INTRAMUSCULAR; INTRAVENOUS EVERY 6 HOURS PRN
Status: DISCONTINUED | OUTPATIENT
Start: 2024-12-16 | End: 2024-12-19

## 2024-12-16 RX ORDER — MAGNESIUM OXIDE 400 MG/1
400 TABLET ORAL DAILY
Status: DISCONTINUED | OUTPATIENT
Start: 2024-12-16 | End: 2024-12-19

## 2024-12-16 RX ORDER — MORPHINE SULFATE 4 MG/ML
2 INJECTION, SOLUTION INTRAMUSCULAR; INTRAVENOUS EVERY 10 MIN PRN
Status: DISCONTINUED | OUTPATIENT
Start: 2024-12-16 | End: 2024-12-16 | Stop reason: HOSPADM

## 2024-12-16 RX ORDER — SODIUM CHLORIDE 9 MG/ML
INJECTION, SOLUTION INTRAVENOUS ONCE
Status: COMPLETED | OUTPATIENT
Start: 2024-12-16 | End: 2024-12-16

## 2024-12-16 RX ORDER — HYDROMORPHONE HYDROCHLORIDE 1 MG/ML
0.4 INJECTION, SOLUTION INTRAMUSCULAR; INTRAVENOUS; SUBCUTANEOUS EVERY 2 HOUR PRN
Status: DISCONTINUED | OUTPATIENT
Start: 2024-12-16 | End: 2024-12-19

## 2024-12-16 RX ORDER — ROCURONIUM BROMIDE 10 MG/ML
INJECTION, SOLUTION INTRAVENOUS AS NEEDED
Status: DISCONTINUED | OUTPATIENT
Start: 2024-12-16 | End: 2024-12-16 | Stop reason: SURG

## 2024-12-16 RX ORDER — LIDOCAINE HYDROCHLORIDE 10 MG/ML
INJECTION, SOLUTION EPIDURAL; INFILTRATION; INTRACAUDAL; PERINEURAL AS NEEDED
Status: DISCONTINUED | OUTPATIENT
Start: 2024-12-16 | End: 2024-12-16 | Stop reason: SURG

## 2024-12-16 RX ORDER — HEPARIN SODIUM 5000 [USP'U]/ML
5000 INJECTION, SOLUTION INTRAVENOUS; SUBCUTANEOUS
Status: COMPLETED | OUTPATIENT
Start: 2024-12-16 | End: 2024-12-16

## 2024-12-16 RX ORDER — ACETAMINOPHEN 500 MG
1000 TABLET ORAL EVERY 8 HOURS SCHEDULED
Status: DISCONTINUED | OUTPATIENT
Start: 2024-12-16 | End: 2024-12-19

## 2024-12-16 RX ORDER — SENNOSIDES 8.6 MG
17.2 TABLET ORAL NIGHTLY PRN
Status: DISCONTINUED | OUTPATIENT
Start: 2024-12-16 | End: 2024-12-19

## 2024-12-16 RX ORDER — HYDROMORPHONE HYDROCHLORIDE 1 MG/ML
0.2 INJECTION, SOLUTION INTRAMUSCULAR; INTRAVENOUS; SUBCUTANEOUS EVERY 5 MIN PRN
Status: DISCONTINUED | OUTPATIENT
Start: 2024-12-16 | End: 2024-12-16 | Stop reason: HOSPADM

## 2024-12-16 RX ORDER — DEXAMETHASONE SODIUM PHOSPHATE 4 MG/ML
VIAL (ML) INJECTION AS NEEDED
Status: DISCONTINUED | OUTPATIENT
Start: 2024-12-16 | End: 2024-12-16 | Stop reason: SURG

## 2024-12-16 RX ORDER — SODIUM CHLORIDE, SODIUM LACTATE, POTASSIUM CHLORIDE, CALCIUM CHLORIDE 600; 310; 30; 20 MG/100ML; MG/100ML; MG/100ML; MG/100ML
INJECTION, SOLUTION INTRAVENOUS CONTINUOUS
Status: DISCONTINUED | OUTPATIENT
Start: 2024-12-16 | End: 2024-12-16 | Stop reason: HOSPADM

## 2024-12-16 RX ORDER — ONDANSETRON 2 MG/ML
4 INJECTION INTRAMUSCULAR; INTRAVENOUS EVERY 6 HOURS PRN
Status: DISCONTINUED | OUTPATIENT
Start: 2024-12-16 | End: 2024-12-16 | Stop reason: HOSPADM

## 2024-12-16 RX ORDER — HYDROMORPHONE HYDROCHLORIDE 1 MG/ML
0.4 INJECTION, SOLUTION INTRAMUSCULAR; INTRAVENOUS; SUBCUTANEOUS EVERY 5 MIN PRN
Status: DISCONTINUED | OUTPATIENT
Start: 2024-12-16 | End: 2024-12-16 | Stop reason: HOSPADM

## 2024-12-16 RX ORDER — METOCLOPRAMIDE HYDROCHLORIDE 5 MG/ML
10 INJECTION INTRAMUSCULAR; INTRAVENOUS EVERY 8 HOURS PRN
Status: DISCONTINUED | OUTPATIENT
Start: 2024-12-16 | End: 2024-12-16 | Stop reason: HOSPADM

## 2024-12-16 RX ORDER — ACETAMINOPHEN 500 MG
1000 TABLET ORAL ONCE
Status: COMPLETED | OUTPATIENT
Start: 2024-12-16 | End: 2024-12-16

## 2024-12-16 RX ORDER — METRONIDAZOLE 500 MG/100ML
500 INJECTION, SOLUTION INTRAVENOUS ONCE
Status: COMPLETED | OUTPATIENT
Start: 2024-12-16 | End: 2024-12-16

## 2024-12-16 RX ADMIN — ONDANSETRON 4 MG: 2 INJECTION INTRAMUSCULAR; INTRAVENOUS at 15:36:00

## 2024-12-16 RX ADMIN — SODIUM CHLORIDE: 9 INJECTION, SOLUTION INTRAVENOUS at 14:41:00

## 2024-12-16 RX ADMIN — ROCURONIUM BROMIDE 20 MG: 10 INJECTION, SOLUTION INTRAVENOUS at 15:20:00

## 2024-12-16 RX ADMIN — ROCURONIUM BROMIDE 20 MG: 10 INJECTION, SOLUTION INTRAVENOUS at 14:19:00

## 2024-12-16 RX ADMIN — SODIUM CHLORIDE: 9 INJECTION, SOLUTION INTRAVENOUS at 13:33:00

## 2024-12-16 RX ADMIN — METRONIDAZOLE 500 MG: 500 INJECTION, SOLUTION INTRAVENOUS at 13:42:00

## 2024-12-16 RX ADMIN — ROCURONIUM BROMIDE 10 MG: 10 INJECTION, SOLUTION INTRAVENOUS at 14:44:00

## 2024-12-16 RX ADMIN — SODIUM CHLORIDE: 9 INJECTION, SOLUTION INTRAVENOUS at 15:57:00

## 2024-12-16 RX ADMIN — DEXAMETHASONE SODIUM PHOSPHATE 8 MG: 4 MG/ML VIAL (ML) INJECTION at 13:35:00

## 2024-12-16 RX ADMIN — EPHEDRINE SULFATE 5 MG: 50 INJECTION, SOLUTION INTRAVENOUS at 14:35:00

## 2024-12-16 RX ADMIN — ROCURONIUM BROMIDE 50 MG: 10 INJECTION, SOLUTION INTRAVENOUS at 13:35:00

## 2024-12-16 RX ADMIN — LIDOCAINE HYDROCHLORIDE 50 MG: 10 INJECTION, SOLUTION EPIDURAL; INFILTRATION; INTRACAUDAL; PERINEURAL at 13:35:00

## 2024-12-16 NOTE — ANESTHESIA PROCEDURE NOTES
Airway  Date/Time: 12/16/2024 1:36 PM  Urgency: elective      General Information and Staff    Patient location during procedure: OR  Resident/CRNA: Sanam Redman CRNA  Performed: CRNA   Performed by: Sanam Redman CRNA  Authorized by: Fina Tyler MD      Indications and Patient Condition  Indications for airway management: anesthesia  Spontaneous ventilation: present  Sedation level: deep  Preoxygenated: yes  Patient position: sniffing  MILS maintained throughout  Mask difficulty assessment: 1 - vent by mask  Planned trial extubation    Final Airway Details  Final airway type: endotracheal airway      Successful airway: ETT  Cuffed: yes   Successful intubation technique: direct laryngoscopy  Facilitating devices/methods: intubating stylet  Endotracheal tube insertion site: oral  Blade: Velarde  Blade size: #2  ETT size (mm): 7.0    Cormack-Lehane Classification: grade I - full view of glottis  Placement verified by: capnometry   Measured from: gums  ETT to gums (cm): 21

## 2024-12-16 NOTE — ANESTHESIA PREPROCEDURE EVALUATION
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]    Relevant Problems   No relevant active problems       NPO:  Last Liquid Consumption Date: 12/16/24  Last Liquid Consumption Time: 0800  Last Solid Consumption Date: 12/15/24  Last Solid Consumption Time: 1300  Last Liquid Consumption Date: 12/16/24          History Review:  Patient Active Problem List    Diagnosis Date Noted    Abnormal CT of the abdomen 11/11/2024    Acute blood loss anemia 07/05/2024    GI bleed 07/04/2024    Gastrointestinal hemorrhage, unspecified gastrointestinal hemorrhage type 07/04/2024    Anemia, unspecified type 07/04/2024    Dense breasts 05/01/2024    Family history of breast cancer in sister 05/01/2024    Hemorrhoids 11/21/2007       Past Medical History:    Amenorrhea    Last period    Anxiety    Job threatened[place]-in [place]/fitness program    Chicken pox    Disorder of thyroid    in high school    Hemorrhoids    Dr. Lisette Coronado     History of hepatitis    History of stomach ulcers    Hypothyroidism    Lipid screening    Measles    Mumps    Viral hepatitis C    Visual impairment    glasses       Past Surgical History:   Procedure Laterality Date    Colonoscopy  02/01/2008    Dr. Lisette Coronado, Repeat colonoscopy in five years. Family history of coloncancer     Colonoscopy N/A 03/24/2021    Procedure: COLONOSCOPY;  Surgeon: Nehemias Valdez MD;  Location: [facility] ENDO    Needle biopsy right      benign     Tonsillectomy  1982       Prescriptions Prior to Admission[1]  Current Medications and Prescriptions Ordered in Epic[2]    Allergies[3]    Family History   Problem  Relation Age of Onset    Heart Disease Father         Coronary Artery Disease     Heart Surgery Father         Coronary artery bypass grafting     Heart Disorder Father         Triple bypass 1983, no complications since    Lipids Father         Hyperlipidemia     Dementia Father     Hypertension Mother     Psychiatric Mother         Severe anxiety 1920's, Shock therapy 1962    Other (Pschyologicals problems) Mother     Depression Mother     Cancer Paternal Grandmother         Colon cancer 1993    Cancer Paternal Grandfather         Brain cancer 1959    Cancer Sister     Breast Cancer Sister 57    Stroke Other         Grandmother     Colon Cancer Other         Family history of      Social History     Socioeconomic History    Marital status: Single   Tobacco Use    Smoking status: Never    Smokeless tobacco: Never   Vaping Use    Vaping status: Never Used   Substance and Sexual Activity    Alcohol use: Not Currently     Comment: 1 drink a month    Drug use: No    Sexual activity: Never   Other Topics Concern    Caffeine Concern No    Stress Concern No    Weight Concern No    Special Diet No    Exercise Yes    Seat Belt Yes       Available pre-op labs reviewed.  Lab Results   Component Value Date    WBC 5.5 12/09/2024    RBC 4.31 12/09/2024    HGB 13.1 12/09/2024    HCT 41.1 12/09/2024    MCV 95.4 12/09/2024    MCH 30.4 12/09/2024    MCHC 31.9 12/09/2024    RDW 14.7 12/09/2024    .0 12/09/2024     Lab Results   Component Value Date     12/09/2024    K 5.2 (H) 12/09/2024     12/09/2024    CO2 25.0 12/09/2024    BUN 17 12/09/2024    CREATSERUM 1.00 12/09/2024    GLU 99 12/09/2024    PGLU 99 11/26/2024    CA 10.4 12/09/2024          Vital Signs:  Body mass index is 25.06 kg/m².   height is 1.702 m (5' 7\") and weight is 72.6 kg (160 lb). Her oral temperature is 97.9 °F (36.6 °C). Her blood pressure is 133/73 and her pulse is 57. Her respiration is 16 and oxygen saturation is 97%.   Vitals:    12/09/24  1502 12/16/24 1133   BP:  133/73   Pulse:  57   Resp:  16   Temp:  97.9 °F (36.6 °C)   TempSrc:  Oral   SpO2:  97%   Weight: 72.6 kg (160 lb) 72.6 kg (160 lb)   Height: 1.702 m (5' 7\")         Anesthesia Evaluation     Patient summary reviewed and Nursing notes reviewed    No history of anesthetic complications   Airway   Mallampati: I  TM distance: >3 FB  Neck ROM: full  Dental - Dentition appears grossly intact     Pulmonary    Cardiovascular - normal exam    Neuro/Psych    (+)  anxiety/panic attacks,        GI/Hepatic/Renal    (+) hepatitis, liver disease    Endo/Other    Abdominal                  Anesthesia Plan:   ASA:  3  Plan:   General  Informed Consent Plan and Risks Discussed With:  Patient      I have informed Mikayla Mondragon and/or legal guardian or family member of the nature of the anesthetic plan, benefits, risks including possible dental damage if relevant, major complications, and any alternative forms of anesthetic management.   All of the patient's questions were answered to the best of my ability. The patient desires the anesthetic management as planned.  Vinicius Tyler MD  12/16/2024 12:32 PM  Present on Admission:  **None**           [1]   Medications Prior to Admission   Medication Sig Dispense Refill Last Dose/Taking    pantoprazole 40 MG Oral Tab EC Take 1 tablet (40 mg total) by mouth 2 (two) times daily before meals. 180 tablet 3 12/16/2024 Morning    ferrous sulfate 325 (65 FE) MG Oral Tab EC Take 1 tablet (325 mg total) by mouth 2 (two) times daily with meals.   12/16/2024 at  4:00 AM   [2]   Current Facility-Administered Medications Ordered in Epic   Medication Dose Route Frequency Provider Last Rate Last Admin    cefTRIAXone (Rocephin) 2 g in sodium chloride 0.9% 100 mL IVPB-ADDV  2 g Intravenous Once Demetrio Dickinson MD        metroNIDAZOLE in sodium chloride 0.79% (Flagyl) 5 mg/mL IVPB premix 500 mg  500 mg Intravenous Once Demetrio Dickinson MD        sodium chloride 0.9% infusion    Intravenous Once Demetrio Dickinson MD         No current [facility]-ordered outpatient medications on file.   [3] No Known Allergies

## 2024-12-16 NOTE — ANESTHESIA POSTPROCEDURE EVALUATION
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    EM AN Post Evaluation:   Patient Evaluated in PACU  Patient Participation: complete - patient participated  Level of Consciousness: awake  Pain Score: 0  Pain Management: adequate  Airway Patency:patent  Dental exam unchanged from preop  Yes    Nausea/Vomiting: none  Cardiovascular Status: acceptable  Respiratory Status: acceptable and face mask  Postoperative Hydration acceptable      Sanam Redman CRNA  12/16/2024 4:08 PM

## 2024-12-16 NOTE — H&P
[facility]    Patient Name:  Mikayla Mondragon   YOB: 1960   Gender:  Female   Appt Date:  12/16/24    Provider:  No name on file   Insurance:  [facility]     PATIENT PROVIDERS  Referring Provider: No ref. provider found   Address: No referring provider defined for this encounter.   Phone #: N/A    Primary Care Provider:Misha Goodson MD   Address: [unfilled]   Phone #: [phone]       CHIEF COMPLAINT  No chief complaint on file.       PROBLEMS  Reviewed   Patient Active Problem List   Diagnosis    Hemorrhoids    Dense breasts    Family history of breast cancer in sister    GI bleed    Gastrointestinal hemorrhage, unspecified gastrointestinal hemorrhage type    Anemia, unspecified type    Acute blood loss anemia    Abnormal CT of the abdomen        History of Present Illness:  Very pleasant otherwise healthy six 4-year-old female who is being evaluated today for the above reason.  Patient initially presented with iron deficiency anemia and was evaluated by Dr. Donovan.She underwent an endoscopy on 7/5/2024 which revealed a clean-based gastric ulcer.  This was biopsied.  Initial biopsy consistent with gastric mucosa with mild edema without evidence of dysplasia or carcinoma.  Follow-up endoscopy was performed on 10/21/2024.  This returned to show a persistent 3 to 4 mm clean-based ulcer with heaped up surrounding mucosa.  Repeat biopsy was performed which was unremarkable.  Patient later underwent a CT scan which revealed a 6.7 x 5.6 cm heterogeneous mass along the lesser curvature of the stomach as well as a 2.1 x 1.4 cm low-attenuation lesion with a focal peripheral calcification of the lateral aspect of the left hepatic lobe.  This was followed up with an upper endoscopy and EUS per Dr. Bermudez.  This returned to show 6.4 x 5.9 cm perigastric mass.  EUS guided biopsy was performed which returned to show gastrointestinal stromal tumor, spindle cell type with low  malignant potential.      Vital Signs:  /73 (BP Location: Right arm)   Pulse 57   Temp 97.9 °F (36.6 °C) (Oral)   Resp 16   Ht 1.702 m (5' 7\")   Wt 72.6 kg (160 lb)   LMP 08/12/2013   SpO2 97%   BMI 25.06 kg/m²      Medications Reviewed:  No current outpatient medications on file.     Allergies Reviewed:  Allergies[1]     History:  Reviewed:  Past Medical History:    Amenorrhea    Last period    Anxiety    Job threatened[place]fitness program    Chicken pox    Disorder of thyroid    in high school    Hemorrhoids    Dr. Lisette Coronado     History of hepatitis    History of stomach ulcers    Hypothyroidism    Lipid screening    Measles    Mumps    Viral hepatitis C    Visual impairment    glasses      Reviewed:  Past Surgical History:   Procedure Laterality Date    Colonoscopy  02/01/2008    Dr. Lisette Coronado, Repeat colonoscopy in five years. Family history of coloncancer     Colonoscopy N/A 03/24/2021    Procedure: COLONOSCOPY;  Surgeon: Nehemias Valdez MD;  Location: [facility] ENDO    Needle biopsy right      benign     Tonsillectomy  1982      Reviewed Social History:  Social History     Socioeconomic History    Marital status: Single   Tobacco Use    Smoking status: Never    Smokeless tobacco: Never   Vaping Use    Vaping status: Never Used   Substance and Sexual Activity    Alcohol use: Not Currently     Comment: 1 drink a month    Drug use: No    Sexual activity: Never   Other Topics Concern    Caffeine Concern No    Stress Concern No    Weight Concern No    Special Diet No    Exercise Yes    Seat Belt Yes      Reviewed:  Family History   Problem Relation Age of Onset    Heart Disease Father         Coronary Artery Disease     Heart Surgery Father         Coronary artery bypass grafting     Heart Disorder Father         Triple bypass 1983, no complications since    Lipids Father         Hyperlipidemia     Dementia Father     Hypertension Mother     Psychiatric Mother          Severe anxiety 1920's, Shock therapy 1962    Other (Pschyologicals problems) Mother     Depression Mother     Cancer Paternal Grandmother         Colon cancer 1993    Cancer Paternal Grandfather         Brain cancer 1959    Cancer Sister     Breast Cancer Sister 57    Stroke Other         Grandmother     Colon Cancer Other         Family history of         Review of Systems:  Review of Systems   Constitutional:  Negative for activity change, appetite change, chills, fatigue, fever and unexpected weight change.   HENT:  Negative for congestion and trouble swallowing.    Eyes:  Negative for discharge and redness.   Respiratory:  Negative for cough, chest tightness and shortness of breath.    Cardiovascular:  Negative for chest pain and leg swelling.   Gastrointestinal:  Negative for abdominal distention, abdominal pain and nausea.   Endocrine: Negative for polydipsia and polyuria.   Genitourinary:  Negative for difficulty urinating and dysuria.   Musculoskeletal:  Negative for myalgias.   Skin:  Negative for color change and pallor.   Allergic/Immunologic: Negative for immunocompromised state.   Neurological:  Negative for syncope and weakness.   Hematological:  Does not bruise/bleed easily.   Psychiatric/Behavioral:  Negative for agitation and confusion.         Physical Examination:  Physical Exam  Constitutional:       Appearance: She is well-developed.   HENT:      Head: Normocephalic.   Eyes:      Pupils: Pupils are equal, round, and reactive to light.   Cardiovascular:      Rate and Rhythm: Normal rate and regular rhythm.      Heart sounds: No murmur heard.  Pulmonary:      Effort: Pulmonary effort is normal. No respiratory distress.      Breath sounds: Normal breath sounds. No wheezing or rales.   Abdominal:      General: There is no distension.      Palpations: Abdomen is soft.      Tenderness: There is no abdominal tenderness.   Musculoskeletal:      Cervical back: Normal range of motion.   Skin:     General:  Skin is warm and dry.   Neurological:      Mental Status: She is alert and oriented to person, place, and time.          Assessment / Plan:  Proceed with surgery    Demetrio Dickinson MD  [facility] Surgical Oncology  [facility]  [email or web address]        Follow Up:  No follow-ups on file.       Electronically Signed by: No name on file        [1] No Known Allergies

## 2024-12-16 NOTE — CONSULTS
[facility]    PATIENT'S NAME: PRAVEEN RIVERA   ATTENDING PHYSICIAN: Demetrio Dickinson MD   CONSULTING PHYSICIAN: Jeffrey Danielle MD   PATIENT ACCOUNT#:   [id number]    LOCATION:  [facility] PACU 3 [facility] 10  MEDICAL RECORD #:   [id number]       YOB: 1960  ADMISSION DATE:       12/16/2024      CONSULT DATE:  12/16/2024    REPORT OF CONSULTATION      REASON FOR ADMISSION:  Post partial gastrectomy.    HISTORY OF PRESENT ILLNESS:  Patient is a 64-year-old  female who initially presented in July 2024 for upper gastrointestinal bleed and melena.  EGD identified gastric ulcer.  Repeat EGD later in October showed persistent ulcer, and imaging studies showed perigastric mass.  EGD with endoscopic ultrasound guidance biopsy showed GIST tumor, spindle cell, with low malignant potential.  She was referred to Surgical Oncology and scheduled today for above-mentioned procedure by Dr. Dickinson.  Postoperatively, transferred to PACU for further monitoring.     PAST MEDICAL HISTORY:  None.    PAST SURGICAL HISTORY:  None.    MEDICATIONS:  Please see medication reconciliation list.     ALLERGIES:  No known drug allergies.    SOCIAL HISTORY:  No tobacco or drug use.  Social alcohol.      FAMILY HISTORY:  Mother had hypertension.  Father had coronary artery disease.    REVIEW OF SYSTEMS:  Currently resting in bed.  Some abdominal discomfort.  No chest pain.  No shortness of breath.  Other 12-point review of systems is negative.       PHYSICAL EXAMINATION:    GENERAL:  Alert and oriented to time, place and person.  Mild distress.   VITAL SIGNS:  Temperature 97.5, pulse 57, respiratory rate 15, blood pressure 124/63, pulse ox 98% on room air.  HEENT:  Atraumatic.  Oropharynx clear.  Dry mucous membranes.  Normal hard and soft palate.  Eyes:  Anicteric sclerae.   NECK:  Supple.  No lymphadenopathy.  Trachea midline.  Full range of motion.   LUNGS:  Clear to auscultation bilaterally.  Normal respiratory effort.     HEART:  Regular rate and rhythm.  S1 and S2 auscultated.  No murmur.    ABDOMEN:  Soft, nondistended.  No tenderness.  Laparoscopic incisions.  Hypoactive bowel sounds.   EXTREMITIES:  No peripheral edema, clubbing or cyanosis.   NEUROLOGIC:  Motor and sensory intact.     ASSESSMENT AND PLAN:  Gastrointestinal stromal tumor with peptic ulcer disease, status post robot-assisted laparoscopic excision of gastric mass, partial gastrectomy, and upper endoscopy.  Pain control.  Clear liquid diet.  Monitor hemodynamic status.  Full pathology report still pending.  Further recommendations to follow.     Dictated By Jeffrey Danielle MD  d: 12/16/2024 17:00:47  t: 12/16/2024 17:24:18  Job [id number]  FB/

## 2024-12-17 LAB
ANION GAP SERPL CALC-SCNC: 6 MMOL/L (ref 0–18)
BASOPHILS # BLD AUTO: 0.02 X10(3) UL (ref 0–0.2)
BASOPHILS NFR BLD AUTO: 0.2 %
BUN BLD-MCNC: 11 MG/DL (ref 9–23)
BUN/CREAT SERPL: 10.8 (ref 10–20)
CALCIUM BLD-MCNC: 9.4 MG/DL (ref 8.7–10.4)
CHLORIDE SERPL-SCNC: 109 MMOL/L (ref 98–112)
CO2 SERPL-SCNC: 27 MMOL/L (ref 21–32)
CREAT BLD-MCNC: 1.02 MG/DL
DEPRECATED RDW RBC AUTO: 50.5 FL (ref 35.1–46.3)
EGFRCR SERPLBLD CKD-EPI 2021: 61 ML/MIN/1.73M2 (ref 60–?)
EOSINOPHIL # BLD AUTO: 0 X10(3) UL (ref 0–0.7)
EOSINOPHIL NFR BLD AUTO: 0 %
ERYTHROCYTE [DISTWIDTH] IN BLOOD BY AUTOMATED COUNT: 14.4 % (ref 11–15)
GLUCOSE BLD-MCNC: 119 MG/DL (ref 70–99)
HCT VFR BLD AUTO: 35.2 %
HGB BLD-MCNC: 12 G/DL
IMM GRANULOCYTES # BLD AUTO: 0.06 X10(3) UL (ref 0–1)
IMM GRANULOCYTES NFR BLD: 0.5 %
LYMPHOCYTES # BLD AUTO: 1.09 X10(3) UL (ref 1–4)
LYMPHOCYTES NFR BLD AUTO: 9.8 %
MAGNESIUM SERPL-MCNC: 1.8 MG/DL (ref 1.6–2.6)
MCH RBC QN AUTO: 32.4 PG (ref 26–34)
MCHC RBC AUTO-ENTMCNC: 34.1 G/DL (ref 31–37)
MCV RBC AUTO: 95.1 FL
MONOCYTES # BLD AUTO: 1.06 X10(3) UL (ref 0.1–1)
MONOCYTES NFR BLD AUTO: 9.5 %
NEUTROPHILS # BLD AUTO: 8.92 X10 (3) UL (ref 1.5–7.7)
NEUTROPHILS # BLD AUTO: 8.92 X10(3) UL (ref 1.5–7.7)
NEUTROPHILS NFR BLD AUTO: 80 %
OSMOLALITY SERPL CALC.SUM OF ELEC: 295 MOSM/KG (ref 275–295)
PHOSPHATE SERPL-MCNC: 3.7 MG/DL (ref 2.4–5.1)
PLATELET # BLD AUTO: 158 10(3)UL (ref 150–450)
PLATELETS.RETICULATED NFR BLD AUTO: 15.9 % (ref 0–7)
POTASSIUM SERPL-SCNC: 5 MMOL/L (ref 3.5–5.1)
RBC # BLD AUTO: 3.7 X10(6)UL
SODIUM SERPL-SCNC: 142 MMOL/L (ref 136–145)
WBC # BLD AUTO: 11.2 X10(3) UL (ref 4–11)

## 2024-12-17 PROCEDURE — 99232 SBSQ HOSP IP/OBS MODERATE 35: CPT | Performed by: HOSPITALIST

## 2024-12-17 RX ORDER — HYDROCODONE BITARTRATE AND ACETAMINOPHEN 5; 325 MG/1; MG/1
1 TABLET ORAL EVERY 4 HOURS PRN
Status: DISCONTINUED | OUTPATIENT
Start: 2024-12-17 | End: 2024-12-19

## 2024-12-17 RX ORDER — ZOLPIDEM TARTRATE 5 MG/1
5 TABLET ORAL NIGHTLY PRN
Status: DISCONTINUED | OUTPATIENT
Start: 2024-12-17 | End: 2024-12-19

## 2024-12-17 RX ORDER — HYDROCODONE BITARTRATE AND ACETAMINOPHEN 10; 325 MG/1; MG/1
1 TABLET ORAL EVERY 4 HOURS PRN
Status: DISCONTINUED | OUTPATIENT
Start: 2024-12-17 | End: 2024-12-19

## 2024-12-17 NOTE — OPERATIVE REPORT
Date of operation 12/16/2024    Preoperative diagnosis:  1.  History of gastric gastrointestinal stromal tumor    Operations performed:  1.  Robotic assisted resection of a 6.7 cm intra-abdominal tumor, en bloc partial gastrectomy  2.  Upper endoscopy, leak test    Postoperative diagnosis:  1.  Same    Operating Surgeon:  1.  Demetrio Dickinson MD    Assistant:  1.Surgical Assistant.: Norm Palomino RSA     Indications:  Very pleasant 64-year-old female who was diagnosed with a gastric gastrointestinal stromal tumor.  On CT, tumor measured 6.7 x 5.6 cm.  There was an equivocal lesion in the liver, PET scan was obtained which revealed no uptake in the liver lesion indicating benign disease.  Her case was reviewed in tumor board and she present today for definitive surgical management.    Details of the operation:  Patient was brought to the operating room laid supine on the operating table.  General endotracheal anesthesia was induced without complications.  All pressure points were padded properly.  The arms were tucked.  The abdomen was clipped prepped and draped in a standard center manner.  A timeout completed verify the patient's name, procedure be performed and administration of antibiotics.  Everybody in the room was in agreement.  A nick in the skin was made in the left upper quadrant a Verres needle was introduced.  Pneumoperitoneum was established.  Additional working ports were placed in a straight line across the mid abdomen, 4 x 8 mm.  The right periumbilical port was exchanged for 12 to accommodate a stapler.  A left lower quadrant 5 mm port was placed for an assistant.  Robot was docked per usual.  Attention was directed towards the upper abdomen.  A large gastric exophytic mass was noted.  Dissection was then carried using the vessel sealer opening the lesser sac and dividing the gastro colic ligament.  The tumor was along the lesser curvature in a difficult location just distal to the left gastric pedicle.   The left gastric artery bifurcated near the lesser curvature and a branch of the left gastric artery was divided with the vessel sealer to allow adequate exposure of the tumor.  The tumor was rotated anteriorly and a gastrotomy was made using scissors.  The gastrotomy was then followed with the vessel sealer along the base of the tumor in a manner to include a normal healthy portion of stomach.  The specimen was free.  I then proceeded to close the gastrotomy in a running manner using 2 layers of 2-0 V-Loc suture.  An upper endoscopy was then performed, introduced the scope towards the hiatus however there was difficulty advancing it beyond the hiatus.  The scope was pulled back and I introduced an orogastric tube under direct vision into the stomach.  A leak test was then performed and showed no evidence of air leak.  This concluded the procedure.  Hemostasis was excellent.  The specimen was extracted using a bag through the 12 mm port site after extending it somewhat.  The fascia of the 12 mm port site was closed in a running manner using 0 Vicryl suture.  The remainder of the ports were withdrawn to direct vision and there was no bleeding.  The skin subcutaneous tissue irrigated and the skin approximated in a subcuticular manner using 4-0 Vicryl suture.  Wounds were dressed.  The patient tolerated procedure well and was taken to the postoperative acetic unit in stable state.  I was present for the entire case.    Demetrio Dickinson MD  Complex General Surgical Oncology  [email or web address]  [email or web address]

## 2024-12-17 NOTE — PROGRESS NOTES
[facility]  part of [facility]    Progress Note    Mikayla Mondragon Patient Status:  Inpatient    1960 MRN [id number]   Location [facility] 4W/SW/SE Attending Josefina Epps MD   Hosp Day # 1 PCP Misha Goodson MD     Chief Complaint: No chief complaint on file.      Subjective:   Mikayla Mondragon is doing fair. Had nausea worse with IV meds oxycodone and tylenol work better. Pain tolerable with meds. + nausea better with laying flat. No F/C no vomiting. No flatus no BM. Has been up walking already       Objective:   Objective:    Blood pressure 112/89, pulse 52, temperature 97.8 °F (36.6 °C), temperature source Oral, resp. rate 20, height 5' 7\" (1.702 m), weight 160 lb (72.6 kg), last menstrual period 2013, SpO2 100%, not currently breastfeeding.    Physical Exam:    General: No acute distress.   Respiratory: Clear to auscultation bilaterally. No wheezes. No rhonchi.  Cardiovascular: S1, S2. Regular rate and rhythm. No murmurs, rubs or gallops.   Abdomen: Soft, nontender, nondistended.  hypoactive bowel sounds. No rebound or guarding.  Neurologic: No focal neurological deficits.   Musculoskeletal: Moves all extremities.  Extremities: No edema.      Results:   Results:    Labs:  Recent Labs   Lab 24  0557   WBC 11.2*   HGB 12.0   MCV 95.1   .0       Recent Labs   Lab 24  0557   *   BUN 11   CREATSERUM 1.02   CA 9.4      K 5.0      CO2 27.0       Estimated Creatinine Clearance: 52.8 mL/min (based on SCr of 1.02 mg/dL).    No results for input(s): \"PTP\", \"INR\" in the last 168 hours.         Culture:  No results found for this visit on 24.    Cardiac  No results for input(s): \"TROP\", \"PBNP\" in the last 168 hours.      Imaging: Imaging data reviewed in Epic.  No results found.    Medications:    heparin  5,000 Units Subcutaneous Q8H ANDREW    famotidine  20 mg Oral BID    Or    famotidine  20 mg Intravenous BID    magnesium oxide  400 mg Oral  Daily    acetaminophen  1,000 mg Oral Q8H ANDREW         Assessment and Plan:   Assessment & Plan:        GIST tumor with PUD  Surgical oncology on consult.   S/p robotic assisted laparoscopic excision of gastric mass, partial gastrectomy and upper endoscopy 12/16   Diet per surgery   Path pending   Pepcid  IVF  Pain control.   OOB      >35min spent, >50% spent counseling and coordinating care in the form of educating pt/family and d/w consultants and staff. Most of the time spent discussing the above plan.        Plan of care discussed with patient or family at bedside.    Josefina Epps MD  Hospitalist          Supplementary Documentation:     Quality:  DVT Prophylaxis: heparin   CODE status: Full  Dispo: per clinical course            Estimated date of discharge: TBD  Discharge is dependent on: clinical stability  At this point Ms. Mondragon is expected to be discharge to: home

## 2024-12-17 NOTE — PLAN OF CARE
Patient's pain managed with oxy, dilaudid prn, and cold packs, sched tylenol. Denies of nausea, pt reports only nauseated briefly when getting dilaudid. Ambulating halls, tolerated well. IVF infusing, SL this am. RA. Not passing gas. Up standby, voiding freely. Safety precautions in place.     Problem: Patient Centered Care  Goal: Patient preferences are identified and integrated in the patient's plan of care  Description: Interventions:  - What would you like us to know as we care for you?   - Provide timely, complete, and accurate information to patient/family  - Incorporate patient and family knowledge, values, beliefs, and cultural backgrounds into the planning and delivery of care  - Encourage patient/family to participate in care and decision-making at the level they choose  - Honor patient and family perspectives and choices  Outcome: Progressing     Problem: PAIN - ADULT  Goal: Verbalizes/displays adequate comfort level or patient's stated pain goal  Description: INTERVENTIONS:  - Encourage pt to monitor pain and request assistance  - Assess pain using appropriate pain scale  - Administer analgesics based on type and severity of pain and evaluate response  - Implement non-pharmacological measures as appropriate and evaluate response  - Consider cultural and social influences on pain and pain management  - Manage/alleviate anxiety  - Utilize distraction and/or relaxation techniques  - Monitor for opioid side effects  - Notify MD/LIP if interventions unsuccessful or patient reports new pain  - Anticipate increased pain with activity and pre-medicate as appropriate  Outcome: Progressing     Problem: RISK FOR INFECTION - ADULT  Goal: Absence of fever/infection during anticipated neutropenic period  Description: INTERVENTIONS  - Monitor WBC  - Administer growth factors as ordered  - Implement neutropenic guidelines  Outcome: Progressing      4 = No assist / stand by assistance

## 2024-12-17 NOTE — PHYSICAL THERAPY NOTE
PHYSICAL THERAPY EVALUATION - INPATIENT     Room Number: 456/456-A  Evaluation Date: 12/17/2024  Type of Evaluation: Initial   Physician Order: PT Eval and Treat       Presenting Problem: Robot-assisted laparoscopic excision of gastric mass, partial gastrectomy, upper endoscopy for malignant GIST of stomach on 12/16/24.  Pathology pending  Reason for Therapy: Mobility Dysfunction and Discharge Planning    PHYSICAL THERAPY ASSESSMENT   Patient is a 64 year old female admitted 12/16/2024 for GI tumor s/p lap partial gastrectomy on 12/16/24.  Prior to admission, patient's baseline is independent.  Patient is currently functioning near baseline with bed mobility, transfers, gait, stair negotiation, maintaining seated position, standing prolonged periods, and performing household tasks.    Based on patient demonstrating good understanding of post operative protocol and safe functional mobility observed this date, no further skilled IP PT required at this time. RN made aware. Patient agreeable. Safe for DC to home from a PT standpoint.     PLAN  Patient has been evaluated and presents with no skilled Physical Therapy needs at this time.  Patient will be discharged from Physical Therapy services. Please re-order if a new functional limitation presents during this admission.    PT Device Recommendation: None    PHYSICAL THERAPY MEDICAL/SOCIAL HISTORY   Problem List  Principal Problem:    Gastrointestinal stromal tumor (GIST) (HCC)  Active Problems:    Benign gastrointestinal stromal tumor (GIST)      HOME SITUATION  Type of Home: House  Home Layout: One level  Stairs to Enter : 3   Railing: Yes    Stairs to Bedroom: 3    Railing: Yes    Lives With: Alone (plans to stay at brother's home at DC (details of home listed above))    Drives: Yes   Patient Regularly Uses: None      SUBJECTIVE  \"I've been walking! It feels good to move\"     PHYSICAL THERAPY EXAMINATION   OBJECTIVE  Precautions: Abdominal protective strategies  Fall  Risk: Standard fall risk    PAIN ASSESSMENT  Ratin  Location: abdominal  Management Techniques: Activity promotion;Body mechanics;Repositioning    COGNITION  Overall Cognitive Status:  WFL - within functional limits    RANGE OF MOTION AND STRENGTH ASSESSMENT  Upper extremity ROM and strength are within functional limits   Lower extremity ROM is within functional limits   Lower extremity strength is within functional limits     BALANCE  Static Sitting: Normal  Dynamic Sitting: Normal  Static Standing: Good  Dynamic Standing: Fair +    AM-PAC '6-Clicks' INPATIENT SHORT FORM - BASIC MOBILITY  How much difficulty does the patient currently have...  Patient Difficulty: Turning over in bed (including adjusting bedclothes, sheets and blankets)?: None   Patient Difficulty: Sitting down on and standing up from a chair with arms (e.g., wheelchair, bedside commode, etc.): None   Patient Difficulty: Moving from lying on back to sitting on the side of the bed?: None   How much help from another person does the patient currently need...   Help from Another: Moving to and from a bed to a chair (including a wheelchair)?: None   Help from Another: Need to walk in hospital room?: None   Help from Another: Climbing 3-5 steps with a railing?: A Little     AM-PAC Score:  Raw Score: 23   Approx Degree of Impairment: 11.2%   Standardized Score (AM-PAC Scale): 56.93   CMS Modifier (G-Code): CI    FUNCTIONAL ABILITY STATUS  Functional Mobility/Gait Assessment  Gait Assistance: Modified independent  Distance (ft): 400ft  Assistive Device: Rolling walker  Pattern: Within Functional Limits  Stairs: Stairs  How Many Stairs: 8  Device: 1 Rail  Assist: Supervision  Pattern: Ascend and Descend  Ascend and Descend : Step to  Rolling: independent  Supine to Sit: independent  Sit to Supine: independent  Sit to Stand: independent    Exercise/Education Provided:  Bed mobility  Body mechanics  Energy conservation  Functional activity tolerated  Gait  training  Posture  ROM  Strengthening  Transfer training  Stair negotiation    Skilled Therapy Provided: Patient received supine in bed. RN approved activity. Coordinated session with OT. Therapist educated pt on POC and physiological benefits of mobilization post operatively. Reviewed abdominal protective strategy along with body mechanics/positioning techniques to optimize healing and comfort. Provided with 'rafael pillow' to be used as counter pressure during instances of high pressure. Patient agreeable to participate. Primary complaint is post op abdominal pain which she rates at 1 out of 10 per pain scale. Reports laying flat improves pain, however, encouraged intermittent sitting up in chair to improve OOB tolerance.     The patient's Approx Degree of Impairment: 11.2% has been calculated based on documentation in the [facility] '6 clicks' Inpatient Basic Mobility Short Form.  Research supports that patients with this level of impairment may benefit from home.  Final disposition will be made by interdisciplinary medical team.    Patient End of Session: In bed;Needs met;Call light within reach;RN aware of session/findings    Patient was able to achieve the following ...   Patient able to transfer  At previous, functional level    Patient able to ambulate on level surfaces  At previous, functional level     Patient Evaluation Complexity Level:  History Low - no personal factors and/or co-morbidities   Examination of body systems Low -  addressing 1-2 elements   Clinical Presentation Low- Stable   Clinical Decision Making  Low Complexity     Gait Training: 15 minutes  Therapeutic Activity:  10 minutes

## 2024-12-17 NOTE — OCCUPATIONAL THERAPY NOTE
OCCUPATIONAL THERAPY EVALUATION - INPATIENT     Room Number: 456/456-A  Evaluation Date: 12/17/2024  Type of Evaluation: Initial  Presenting Problem: GI tumor s/p lap partial gastrectomy    Physician Order: IP Consult to Occupational Therapy  Reason for Therapy: ADL/IADL Dysfunction and Discharge Planning    OCCUPATIONAL THERAPY ASSESSMENT   Patient is a 64 year old female admitted 12/16/2024 for lap partial gastrectomy for GI tumor.  Prior to admission, patient was independent w/ adls, ambulation w/o an ad and driving. Pt lives alone and will be staying w/ her brother upon discharge. Pt works as a  for a non profit.  Patient is currently functioning near baseline with adls and functional mobility.    PLAN  Patient has been evaluated and presents with no skilled Occupational Therapy needs  at this time.  Patient will be discharged from Occupational Therapy services.     OT Device Recommendations: None    OCCUPATIONAL THERAPY MEDICAL/SOCIAL HISTORY   Problem List  Principal Problem:    Gastrointestinal stromal tumor (GIST) (HCC)  Active Problems:    Benign gastrointestinal stromal tumor (GIST)    HOME SITUATION  Type of Home: House  Home Layout: One level  Lives With: Alone (plans to stay at brother's home at NH (details of home listed above))  Toilet and Equipment: Comfort height toilet; Grab bar  Other Equipment: None  Occupation/Status: -- ( for a non profit)  Drives: Yes  Patient Regularly Uses: None    Stairs in Home: 3 stairs to enter;3 on inside  Use of Assistive Device(s): none    Prior Level of [place]:  Prior to admission, patient was independent w/ adls, ambulation w/o an ad and driving. Pt lives alone and will be staying w/ her brother upon discharge. Pt works as a  for a non profit    SUBJECTIVE  \"If I lay flat it feels better\"    OCCUPATIONAL THERAPY EXAMINATION    OBJECTIVE  Precautions: Abdominal protective strategies  Fall Risk: Standard fall risk    PAIN ASSESSMENT  Rating:  1  Location: -- (abdominal)  Management Techniques: Activity promotion; Relaxation; Repositioning (abdominal bracing)    ACTIVITY TOLERANCE  good    O2 SATURATIONS  Activity on room air    Behavioral/Emotional/Social: pleasant,receptive to teaching    RANGE OF MOTION   Upper extremity ROM is within functional limits     STRENGTH ASSESSMENT  Upper extremity strength is within functional limits     ACTIVITIES OF DAILY LIVING ASSESSMENT  AM-PAC ‘6-Clicks’ Inpatient Daily Activity Short Form  How much help from another person does the patient currently need…  -   Putting on and taking off regular lower body clothing?: A Little  -   Bathing (including washing, rinsing, drying)?: A Little  -   Toileting, which includes using toilet, bedpan or urinal? : None  -   Putting on and taking off regular upper body clothing?: None  -   Taking care of personal grooming such as brushing teeth?: None  -   Eating meals?: None    AM-PAC Score:  Score: 22  Approx Degree of Impairment: 25.8%  Standardized Score (AM-PAC Scale): 47.1  CMS Modifier (G-Code): CJ    FUNCTIONAL ADL ASSESSMENT  Eating: independent  Grooming: independent  UB Dressing: independent  LB Dressing: min assist  Toileting: independent    Skilled Therapy Provided: OT orders received and chart reviewed. Treatment coordinated w/ PT. Pt agreeable to participation in therapy. Gait belt used during dynamic activity. Pt received in bed, alert and oriented x 4. Abdominal sparing strategies discussed w/ emphasis on adls and transfers. Pt verbalizing understanding of information as discussed. On initial contact pt transitioned supine<>sit w/ supervision/vc to log roll. Pt performing sit<>stand transfers bed/toilet w/ supervision. Pt completed tolieting task w/ mod I. Pt independently maintained static standing at sink level to complete hand hygiene. Pt ambulating in the narayanan w/ rw and supervision. Pt currently unable to identify potential concerns in anticipation of discharge.  Pt presenting w/ sound judgement and good insight into limitations following surgery    At end of session pt returned to bed and left w/ all needs in reach. RN aware of pt's status and performance in therapy. No further OT contact planned    EDUCATION PROVIDED  Patient Education : Role of Occupational Therapy; Plan of Care; Functional Transfer Techniques; Fall Prevention; Surgical Precautions; Posture/Positioning; Abdominal Protective Strategies (compensatory strategies)  Patient's Response to Education: Verbalized Understanding; Returned Demonstration    The patient's Approx Degree of Impairment: 25.8% has been calculated based on documentation in the [facility] '6 clicks' Inpatient Daily Activity Short Form.  Research supports that patients with this level of impairment may benefit from return to home.  Final disposition will be made by interdisciplinary medical team.    Patient End of Session: In bed;Needs met;Call light within reach;RN aware of session/findings;All patient questions and concerns addressed    Patient Evaluation Complexity Level:   Occupational Profile/Medical History LOW - Brief history including review of medical or therapy records    Specific performance deficits impacting engagement in ADL/IADL LOW  1 - 3 performance deficits    Client Assessment/Performance Deficits LOW - No comorbidities nor modifications of tasks    Clinical Decision Making LOW - Analysis of occupational profile, problem-focused assessments, limited treatment options    Overall Complexity LOW     Therapeutic Activity: 15 minutes

## 2024-12-17 NOTE — PROGRESS NOTES
[facility]  Progress Note    Mikayla Mondragon Patient Status:  Inpatient    1960 MRN [id number]   Location [facility] 4W/SW/SE Attending Josefina Epps MD   Hosp Day # 1 PCP Misha Goodson MD     Subjective:  Patient resting in bed.  She feels okay today.  She has moderate abdominal tenderness that is helps by pain medication.  She denies passing gas or having bowel movements.  She has had poor appetite since surgery, tolerating clear liquids well.  She has been able to ambulate on the floor.    Objective/Physical Exam:  General: Alert, orientated x3.  Cooperative.  No apparent distress.  Vital Signs:  Blood pressure 112/89, pulse 52, temperature 97.8 °F (36.6 °C), temperature source Oral, resp. rate 20, height 5' 7\" (1.702 m), weight 160 lb (72.6 kg), last menstrual period 2013, SpO2 100%, not currently breastfeeding.  Wt Readings from Last 3 Encounters:   24 160 lb (72.6 kg)   24 160 lb (72.6 kg)   24 162 lb 3.2 oz (73.6 kg)     Lungs: No respiratory distress.  Cardiac: Regular rate and rhythm.   Abdomen:  Soft, not distended, moderately tender, with no rebound or guarding.  No peritoneal signs.   Extremities:  No lower extremity edema noted.    Incision: Clean, dry, intact, no erythema. Dressings in place      Intake/Output:    Intake/Output Summary (Last 24 hours) at 2024 0952  Last data filed at 2024 0600  Gross per 24 hour   Intake 4182.6 ml   Output 200 ml   Net 3982.6 ml     I/O last 3 completed shifts:  In: 4182.6 [P.O.:480; I.V.:3702.6]  Out: 200 [Urine:200]  No intake/output data recorded.    Medications:    heparin  5,000 Units Subcutaneous Q8H ANDREW    famotidine  20 mg Oral BID    Or    famotidine  20 mg Intravenous BID    magnesium oxide  400 mg Oral Daily    acetaminophen  1,000 mg Oral Q8H ANDREW       Labs:  Lab Results   Component Value Date    WBC 11.2 2024    HGB 12.0 2024    HCT 35.2 2024    .0 2024      Lab Results   Component Value Date     12/17/2024    K 5.0 12/17/2024     12/17/2024    CO2 27.0 12/17/2024    BUN 11 12/17/2024    CREATSERUM 1.02 12/17/2024     12/17/2024    CA 9.4 12/17/2024     No results found for: \"PT\", \"INR\"      Assessment  Patient Active Problem List   Diagnosis    Hemorrhoids    Dense breasts    Family history of breast cancer in sister    GI bleed    Gastrointestinal hemorrhage, unspecified gastrointestinal hemorrhage type    Anemia, unspecified type    Acute blood loss anemia    Abnormal CT of the abdomen    Gastrointestinal stromal tumor (GIST) (HCC)    Benign gastrointestinal stromal tumor (GIST)     POD 1: Robot-assisted laparoscopic excision of gastric mass, partial gastrectomy, 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  Continue diet advancement as tolerated  Analgesics and antiemetics as needed  Ambulate and up to chair  DVT prophylaxis with heparin  GI prophylaxis with pepcid    Quality:  DVT Mechanical Prophylaxis:   SCDs, Early ambuation  DVT Pharmacologic Prophylaxis   Medication    heparin (Porcine) 5000 UNIT/ML injection 5,000 Units                Code Status: Not on file  Galarza: No urinary catheter in place  Galarza Duration (in days):   Central line:    TIESHA: 12/17/2024        TYRA Croft  12/17/2024  9:52 AM

## 2024-12-18 LAB
DEPRECATED RDW RBC AUTO: 50.7 FL (ref 35.1–46.3)
ERYTHROCYTE [DISTWIDTH] IN BLOOD BY AUTOMATED COUNT: 14.5 % (ref 11–15)
HCT VFR BLD AUTO: 34.2 %
HGB BLD-MCNC: 11.5 G/DL
MCH RBC QN AUTO: 31.9 PG (ref 26–34)
MCHC RBC AUTO-ENTMCNC: 33.6 G/DL (ref 31–37)
MCV RBC AUTO: 94.7 FL
PLATELET # BLD AUTO: 145 10(3)UL (ref 150–450)
PLATELETS.RETICULATED NFR BLD AUTO: 15.2 % (ref 0–7)
RBC # BLD AUTO: 3.61 X10(6)UL
WBC # BLD AUTO: 8.9 X10(3) UL (ref 4–11)

## 2024-12-18 PROCEDURE — 99233 SBSQ HOSP IP/OBS HIGH 50: CPT | Performed by: HOSPITALIST

## 2024-12-18 NOTE — PLAN OF CARE
Patient's pain managed with oxy, dilaudid prn, and cold packs, sched tylenol. Denies of nausea. SL. RA. Not passing gas. Up self after set up, voiding freely. Tolerating clears/fulls, pt will try to eat more per her. Safety precautions in place.     Problem: Patient Centered Care  Goal: Patient preferences are identified and integrated in the patient's plan of care  Description: Interventions:  - What would you like us to know as we care for you?   - Provide timely, complete, and accurate information to patient/family  - Incorporate patient and family knowledge, values, beliefs, and cultural backgrounds into the planning and delivery of care  - Encourage patient/family to participate in care and decision-making at the level they choose  - Honor patient and family perspectives and choices  Outcome: Progressing     Problem: PAIN - ADULT  Goal: Verbalizes/displays adequate comfort level or patient's stated pain goal  Description: INTERVENTIONS:  - Encourage pt to monitor pain and request assistance  - Assess pain using appropriate pain scale  - Administer analgesics based on type and severity of pain and evaluate response  - Implement non-pharmacological measures as appropriate and evaluate response  - Consider cultural and social influences on pain and pain management  - Manage/alleviate anxiety  - Utilize distraction and/or relaxation techniques  - Monitor for opioid side effects  - Notify MD/LIP if interventions unsuccessful or patient reports new pain  - Anticipate increased pain with activity and pre-medicate as appropriate  Outcome: Progressing

## 2024-12-18 NOTE — PROGRESS NOTES
[facility]  Progress Note    Mikayla Mondragon Patient Status:  Inpatient    1960 MRN [id number]   Location [facility] 4W/SW/SE Attending Josefina Epps MD   Hosp Day # 2 PCP Misha Goodson MD     Subjective:  Patient resting in bed.  She feels much improved today. Her abdominal pain is managed well with pain medication.  She denies passing gas or having bowel movements.  She is tolerating soft foods well.  She has been able to ambulate on the floor.    Objective/Physical Exam:  General: Alert, orientated x3.  Cooperative.  No apparent distress.  Vital Signs:  Blood pressure 117/59, pulse 50, temperature 98 °F (36.7 °C), temperature source Oral, resp. rate 18, height 5' 7\" (1.702 m), weight 160 lb (72.6 kg), last menstrual period 2013, SpO2 96%, not currently breastfeeding.  Wt Readings from Last 3 Encounters:   24 160 lb (72.6 kg)   24 160 lb (72.6 kg)   24 162 lb 3.2 oz (73.6 kg)     Lungs: No respiratory distress.  Cardiac: Regular rate and rhythm.   Abdomen:  Soft, not distended, mildly tender, with no rebound or guarding.  No peritoneal signs.   Extremities:  No lower extremity edema noted.    Incision: Clean, dry, intact, no erythema. Steri strips in place      Intake/Output:    Intake/Output Summary (Last 24 hours) at 2024 1125  Last data filed at 2024 0000  Gross per 24 hour   Intake 720 ml   Output --   Net 720 ml     I/O last 3 completed shifts:  In: 2455.6 [P.O.:1200; I.V.:1255.6]  Out: -   No intake/output data recorded.    Medications:    heparin  5,000 Units Subcutaneous Q8H ANDREW    famotidine  20 mg Oral BID    Or    famotidine  20 mg Intravenous BID    magnesium oxide  400 mg Oral Daily    acetaminophen  1,000 mg Oral Q8H ANDREW       Labs:  Lab Results   Component Value Date    WBC 8.9 2024    HGB 11.5 2024    HCT 34.2 2024    .0 2024          No results found for: \"PT\", \"INR\"      Assessment  Patient Active  Problem List   Diagnosis    Hemorrhoids    Dense breasts    Family history of breast cancer in sister    GI bleed    Gastrointestinal hemorrhage, unspecified gastrointestinal hemorrhage type    Anemia, unspecified type    Acute blood loss anemia    Abnormal CT of the abdomen    Gastrointestinal stromal tumor (GIST) (HCC)    Benign gastrointestinal stromal tumor (GIST)     POD 2: Robot-assisted laparoscopic excision of gastric mass, partial gastrectomy, 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  Continue post gastrectomy diet as tolerated.  Analgesics and antiemetics as needed  Ambulate and up to chair  DVT prophylaxis with heparin  GI prophylaxis with pepcid    Quality:  DVT Mechanical Prophylaxis:   SCDs, Early ambuation  DVT Pharmacologic Prophylaxis   Medication    heparin (Porcine) 5000 UNIT/ML injection 5,000 Units                Code Status: Full Code  Galarza: No urinary catheter in place  Galarza Duration (in days):   Central line:    TIESHA: 12/19/2024        TYRA Croft  12/18/2024  11:24 AM

## 2024-12-18 NOTE — PLAN OF CARE
Vitals within normal limits. Room Air. Ambulating independently.   Abdominal binder in place for comfort.   Surgical dressings in place.   PRN Oxycodone for pain control. Scheduled Tylenol.   +Belching. - Gas. Hypoactive bowels.   Nauseated after 2 spoonfuls of yogurt this morning. No emesis. PRN Zofran.   Call light within reach.    Problem: Patient Centered Care  Goal: Patient preferences are identified and integrated in the patient's plan of care  Description: Interventions:  - Provide timely, complete, and accurate information to patient/family  - Incorporate patient and family knowledge, values, beliefs, and cultural backgrounds into the planning and delivery of care  - Encourage patient/family to participate in care and decision-making at the level they choose  - Honor patient and family perspectives and choices  Outcome: Progressing    Problem: PAIN - ADULT  Goal: Verbalizes/displays adequate comfort level or patient's stated pain goal  Description: INTERVENTIONS:  - Encourage pt to monitor pain and request assistance  - Assess pain using appropriate pain scale  - Administer analgesics based on type and severity of pain and evaluate response  - Implement non-pharmacological measures as appropriate and evaluate response  - Consider cultural and social influences on pain and pain management  - Manage/alleviate anxiety  - Utilize distraction and/or relaxation techniques  - Monitor for opioid side effects  - Notify MD/LIP if interventions unsuccessful or patient reports new pain  - Anticipate increased pain with activity and pre-medicate as appropriate  Outcome: Progressing     Problem: RISK FOR INFECTION - ADULT  Goal: Absence of fever/infection during anticipated neutropenic period  Description: INTERVENTIONS  - Monitor WBC  - Administer growth factors as ordered  - Implement neutropenic guidelines  Outcome: Progressing

## 2024-12-18 NOTE — PROGRESS NOTES
[facility]  part of [facility]    Progress Note    Mikayla Mondragon Patient Status:  Inpatient    1960 MRN [id number]   Location [facility] 4W/SW/SE Attending Josefina Epps MD   Hosp Day # 2 PCP Misha Goodson MD     Chief Complaint: No chief complaint on file.      Subjective:   Mikayla Mondragon is ambulating well. Tolerating diet. Afebrile. Resting in bed at the moment. Fast asleep. Woke up a little bit then went back to sleep.     Objective:   Objective:    Blood pressure 117/59, pulse 50, temperature 98 °F (36.7 °C), temperature source Oral, resp. rate 18, height 5' 7\" (1.702 m), weight 160 lb (72.6 kg), last menstrual period 2013, SpO2 96%, not currently breastfeeding.    Physical Exam:    General: No acute distress.   Respiratory: Clear to auscultation bilaterally. No wheezes. No rhonchi.  Cardiovascular: S1, S2. Regular rate and rhythm. No murmurs, rubs or gallops.   Abdomen: Soft, nontender, nondistended.  hypoactive bowel sounds. No rebound or guarding.  Neurologic: No focal neurological deficits.   Musculoskeletal: Moves all extremities.  Extremities: No edema.      Results:   Results:    Labs:  Recent Labs   Lab 24  0557 24  0609   WBC 11.2* 8.9   HGB 12.0 11.5*   MCV 95.1 94.7   .0 145.0*       Recent Labs   Lab 24  0557   *   BUN 11   CREATSERUM 1.02   CA 9.4      K 5.0      CO2 27.0       Estimated Creatinine Clearance: 52.8 mL/min (based on SCr of 1.02 mg/dL).    No results for input(s): \"PTP\", \"INR\" in the last 168 hours.         Culture:  No results found for this visit on 24.    Cardiac  No results for input(s): \"TROP\", \"PBNP\" in the last 168 hours.      Imaging: Imaging data reviewed in Epic.  No results found.    Medications:    heparin  5,000 Units Subcutaneous Q8H ANDREW    famotidine  20 mg Oral BID    Or    famotidine  20 mg Intravenous BID    magnesium oxide  400 mg Oral Daily    acetaminophen  1,000 mg Oral  Q8H [facility]         Assessment and Plan:   Assessment & Plan:        GIST tumor with PUD  Surgical oncology on consult.   S/p robotic assisted laparoscopic excision of gastric mass, partial gastrectomy and upper endoscopy 12/16   Postgastrectomy diet  per surgery   Path pending   Pepcid  IVF  Pain control.   OOB      >35min spent, >50% spent counseling and coordinating care in the form of educating pt/family and d/w consultants and staff. Most of the time spent discussing the above plan.        Plan of care discussed with patient or family at bedside.      Vero No MD  Hospitalist          Supplementary Documentation:     Quality:  DVT Prophylaxis: heparin   CODE status: Full  Dispo: per clinical course            Estimated date of discharge: TBD  Discharge is dependent on: clinical stability  At this point Ms. Mondragon is expected to be discharge to: home

## 2024-12-19 ENCOUNTER — PATIENT OUTREACH (OUTPATIENT)
Dept: CASE MANAGEMENT | Age: 64
End: 2024-12-19

## 2024-12-19 ENCOUNTER — TELEPHONE (OUTPATIENT)
Dept: FAMILY MEDICINE CLINIC | Facility: CLINIC | Age: 64
End: 2024-12-19

## 2024-12-19 VITALS
RESPIRATION RATE: 18 BRPM | WEIGHT: 160 LBS | BODY MASS INDEX: 25.11 KG/M2 | OXYGEN SATURATION: 100 % | HEIGHT: 67 IN | HEART RATE: 57 BPM | DIASTOLIC BLOOD PRESSURE: 79 MMHG | SYSTOLIC BLOOD PRESSURE: 144 MMHG | TEMPERATURE: 98 F

## 2024-12-19 LAB
ANION GAP SERPL CALC-SCNC: 3 MMOL/L (ref 0–18)
BASOPHILS # BLD AUTO: 0.06 X10(3) UL (ref 0–0.2)
BASOPHILS NFR BLD AUTO: 0.7 %
BUN BLD-MCNC: 8 MG/DL (ref 9–23)
BUN/CREAT SERPL: 8.5 (ref 10–20)
CALCIUM BLD-MCNC: 9.9 MG/DL (ref 8.7–10.4)
CHLORIDE SERPL-SCNC: 108 MMOL/L (ref 98–112)
CO2 SERPL-SCNC: 31 MMOL/L (ref 21–32)
CREAT BLD-MCNC: 0.94 MG/DL
DEPRECATED RDW RBC AUTO: 49.8 FL (ref 35.1–46.3)
EGFRCR SERPLBLD CKD-EPI 2021: 67 ML/MIN/1.73M2 (ref 60–?)
EOSINOPHIL # BLD AUTO: 0.08 X10(3) UL (ref 0–0.7)
EOSINOPHIL NFR BLD AUTO: 1 %
ERYTHROCYTE [DISTWIDTH] IN BLOOD BY AUTOMATED COUNT: 14.3 % (ref 11–15)
GLUCOSE BLD-MCNC: 114 MG/DL (ref 70–99)
HCT VFR BLD AUTO: 36.7 %
HGB BLD-MCNC: 11.9 G/DL
IMM GRANULOCYTES # BLD AUTO: 0.04 X10(3) UL (ref 0–1)
IMM GRANULOCYTES NFR BLD: 0.5 %
LYMPHOCYTES # BLD AUTO: 1.05 X10(3) UL (ref 1–4)
LYMPHOCYTES NFR BLD AUTO: 12.7 %
MCH RBC QN AUTO: 30.8 PG (ref 26–34)
MCHC RBC AUTO-ENTMCNC: 32.4 G/DL (ref 31–37)
MCV RBC AUTO: 95.1 FL
MONOCYTES # BLD AUTO: 0.64 X10(3) UL (ref 0.1–1)
MONOCYTES NFR BLD AUTO: 7.7 %
NEUTROPHILS # BLD AUTO: 6.43 X10 (3) UL (ref 1.5–7.7)
NEUTROPHILS # BLD AUTO: 6.43 X10(3) UL (ref 1.5–7.7)
NEUTROPHILS NFR BLD AUTO: 77.4 %
OSMOLALITY SERPL CALC.SUM OF ELEC: 293 MOSM/KG (ref 275–295)
PLATELET # BLD AUTO: 160 10(3)UL (ref 150–450)
PLATELETS.RETICULATED NFR BLD AUTO: 16.7 % (ref 0–7)
POTASSIUM SERPL-SCNC: 4 MMOL/L (ref 3.5–5.1)
RBC # BLD AUTO: 3.86 X10(6)UL
SODIUM SERPL-SCNC: 142 MMOL/L (ref 136–145)
WBC # BLD AUTO: 8.3 X10(3) UL (ref 4–11)

## 2024-12-19 PROCEDURE — 99239 HOSP IP/OBS DSCHRG MGMT >30: CPT | Performed by: HOSPITALIST

## 2024-12-19 RX ORDER — HYDROCODONE BITARTRATE AND ACETAMINOPHEN 5; 325 MG/1; MG/1
1 TABLET ORAL EVERY 4 HOURS PRN
Qty: 20 TABLET | Refills: 0 | Status: SHIPPED | OUTPATIENT
Start: 2024-12-19

## 2024-12-19 NOTE — PROGRESS NOTES
[facility]  Progress Note    Mikayla Mondragon Patient Status:  Inpatient    1960 MRN [id number]   Location [facility] 4W/SW/SE Attending Josefina Epps MD   Hosp Day # 3 PCP Misha Goodson MD     Subjective:  VSS  Bloated    Objective/Physical Exam:  General: Alert, orientated x3.  Cooperative.  No apparent distress.  Vital Signs:  Blood pressure 124/66, pulse 71, temperature 97.9 °F (36.6 °C), temperature source Oral, resp. rate 18, height 1.702 m (5' 7\"), weight 72.6 kg (160 lb), last menstrual period 2013, SpO2 100%, not currently breastfeeding.  Wt Readings from Last 3 Encounters:   24 72.6 kg (160 lb)   24 72.6 kg (160 lb)   24 73.6 kg (162 lb 3.2 oz)     Lungs: No respiratory distress.  Cardiac: Regular rate and rhythm.   Abdomen:  Soft, not distended, mildly tender, with no rebound or guarding.  No peritoneal signs.   Extremities:  No lower extremity edema noted.    Incision: Clean, dry, intact, no erythema. Steri strips in place      Intake/Output:    Intake/Output Summary (Last 24 hours) at 2024 0740  Last data filed at 2024 1824  Gross per 24 hour   Intake 460 ml   Output --   Net 460 ml     I/O last 3 completed shifts:  In: 700 [P.O.:700]  Out: -   No intake/output data recorded.    Medications:    heparin  5,000 Units Subcutaneous Q8H ANDREW    famotidine  20 mg Oral BID    Or    famotidine  20 mg Intravenous BID    magnesium oxide  400 mg Oral Daily    acetaminophen  1,000 mg Oral Q8H ANDREW       Labs:  Lab Results   Component Value Date    WBC 8.3 2024    HGB 11.9 2024    HCT 36.7 2024    .0 2024     Lab Results   Component Value Date     2024    K 4.0 2024     2024    CO2 31.0 2024    BUN 8 2024    CREATSERUM 0.94 2024     2024    CA 9.9 2024       No results found for: \"PT\", \"INR\"      Assessment  Patient Active Problem List   Diagnosis     Hemorrhoids    Dense breasts    Family history of breast cancer in sister    GI bleed    Gastrointestinal hemorrhage, unspecified gastrointestinal hemorrhage type    Anemia, unspecified type    Acute blood loss anemia    Abnormal CT of the abdomen    Gastrointestinal stromal tumor (GIST) (HCC)    Benign gastrointestinal stromal tumor (GIST)     POD 3: Robot-assisted laparoscopic excision of gastric mass, partial gastrectomy, 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  May dc from my standpoint  Demetrio Dickinson MD  Complex General Surgical Oncology  [facility]  [email or web address]

## 2024-12-19 NOTE — PLAN OF CARE
Problem: Patient Centered Care  Goal: Patient preferences are identified and integrated in the patient's plan of care  Description: Interventions:  - Provide timely, complete, and accurate information to patient/family  - Incorporate patient and family knowledge, values, beliefs, and cultural backgrounds into the planning and delivery of care  - Encourage patient/family to participate in care and decision-making at the level they choose  - Honor patient and family perspectives and choices  Outcome: Progressing     Problem: PAIN - ADULT  Goal: Verbalizes/displays adequate comfort level or patient's stated pain goal  Description: INTERVENTIONS:  - Encourage pt to monitor pain and request assistance  - Assess pain using appropriate pain scale  - Administer analgesics based on type and severity of pain and evaluate response  - Implement non-pharmacological measures as appropriate and evaluate response  - Consider cultural and social influences on pain and pain management  - Manage/alleviate anxiety  - Utilize distraction and/or relaxation techniques  - Monitor for opioid side effects  - Notify MD/LIP if interventions unsuccessful or patient reports new pain  - Anticipate increased pain with activity and pre-medicate as appropriate  Outcome: Progressing     Problem: RISK FOR INFECTION - ADULT  Goal: Absence of fever/infection during anticipated neutropenic period  Description: INTERVENTIONS  - Monitor WBC  - Administer growth factors as ordered  - Implement neutropenic guidelines  Outcome: Progressing    No acute changes overnight.  Patient aoX4 with complaints of mild/moderate pain managed with scheduled Tylenol and PRN Norco. VSS on room air, no tele, IV saline locked, heparin for VTE prophylaxis, voids freely and ambulates per self. Patient walked around unit twice overnight. Still no bowel movement this shift, but patient is belching and passing gas; PRN senna given.  6 lap sites from surgical procedure with steri  strips CDI. Safety precautions maintained. Call light within reach.

## 2024-12-19 NOTE — DIETARY NOTE
Educated pt on diet post partial gastrecotmy. Discussed progression as tolerated in about 2 weeks or as recommended by MD. Handout and contact information provided.     Carole Merino RD, LDN   Clinical Dietitian [id number]

## 2024-12-19 NOTE — PLAN OF CARE
Vitals within normal limits. Room Air. Ambulating independently.   Abdominal binder in place for comfort.   Surgical sites are open to air with steri strips   PRN Norco for pain control. Scheduled Tylenol.   +Belching. - Gas. Hypoactive bowels. Miralax given this morning.   Denies nausea or vomiting today. Able to tolerate meal in small portions.   Call light within reach.     Problem: PAIN - ADULT  Goal: Verbalizes/displays adequate comfort level or patient's stated pain goal  Description: INTERVENTIONS:  - Encourage pt to monitor pain and request assistance  - Assess pain using appropriate pain scale  - Administer analgesics based on type and severity of pain and evaluate response  - Implement non-pharmacological measures as appropriate and evaluate response  - Consider cultural and social influences on pain and pain management  - Manage/alleviate anxiety  - Utilize distraction and/or relaxation techniques  - Monitor for opioid side effects  - Notify MD/LIP if interventions unsuccessful or patient reports new pain  - Anticipate increased pain with activity and pre-medicate as appropriate  Outcome: Progressing     Problem: RISK FOR INFECTION - ADULT  Goal: Absence of fever/infection during anticipated neutropenic period  Description: INTERVENTIONS  - Monitor WBC  - Administer growth factors as ordered  - Implement neutropenic guidelines  Outcome: Progressing

## 2024-12-19 NOTE — TELEPHONE ENCOUNTER
Patient is being discharged today and is needing a hospital follow up appointment - prefers morning within 7 days.    Currently no open appointments.    Please advise.

## 2024-12-19 NOTE — PROGRESS NOTES
Patient getting discharged; patient would like morning appointment    Dr Demetrio Dickinson  Surgical Oncology   [facility]  [address]  [facility] [address] [phone]   Office advised they will call the patient to schedule appointment tomorrow    Dr Misha Goodson  [address] [address]  [phone]  Dr Goodson's office will call pt, due to no availability in needed time frame  Patient verbalized understanding  Closing encounter

## 2024-12-19 NOTE — TELEPHONE ENCOUNTER
Patient was seen by LR on 12/09/24 for pre op clearance. Malignant GI stromal tumor stomach.  Surgery on 12/16/24.  She is requesting to schedule a post op follow up. Please advise if able to work in to your schedule.  Thank you.

## 2024-12-19 NOTE — DISCHARGE SUMMARY
[facility]  part of [facility]    Discharge Summary    Mikayla Mondragon Patient Status:  Inpatient    1960 MRN [id number]   Location [facility] 4W/SW/SE Attending Vero No MD   Hosp Day # 3 PCP Misha Goodson MD     Date of Admission: 2024   Date of Discharge: 2024    Hospital Discharge Diagnoses: Acute GI Bleed     Lace+ Score: 61  59-90 High Risk  29-58 Medium Risk  0-28   Low Risk.    TCM Follow-Up Recommendation:  LACE > 58: High Risk of readmission after discharge from the hospital.        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 Patient is stable for discharge.     Discharge Physical Exam:   Physical Exam:    General: No acute distress.   Respiratory: Clear to auscultation bilaterally. No wheezes. No rhonchi.  Cardiovascular: S1, S2. Regular rate and rhythm. No murmurs, rubs or gallops.   Abdomen: Soft, nontender, nondistended.  Positive bowel sounds. No rebound or guarding.  Neurologic: No focal neurological deficits.   Musculoskeletal: Moves all extremities.    Hospital Course:     GIST tumor with PUD  Surgical oncology on consult.   S/p robotic assisted laparoscopic excision of gastric mass, partial gastrectomy and upper endoscopy    Postgastrectomy diet  per surgery   Path pending   Home today  Heplock IV  Pain control with norco   OOB       >35min spent, >50% spent counseling and coordinating care in the form of educating pt/family and d/w consultants and staff. Most of the time spent discussing the above plan.     Complications: none    Consultants         Provider    Role Specialty     Jeffrey Danielle MD      Consulting Physician HOSPITALIST          Surgical Procedures       Case IDs Date Procedure Surgeon Location Status    [id number] 12/16/24 XI robot-assisted, laparoscopic, excision of gastric mass, partial gastrectomy, upper endoscopy Demetrio Dickinson MD [facility] MAIN OR Comp          Pending Labs       Order Current Status    PDGFRA Analysis in GISTs In process    Specimen to Pathology Tissue In process            Discharge Plan:   Discharge Condition: Stable    Current Discharge Medication List        New Orders    Details   HYDROcodone-acetaminophen 5-325 MG Oral Tab Take 1 tablet by mouth every 4 (four) hours as needed.           Home Meds - Unchanged    Details   pantoprazole 40 MG Oral Tab EC Take 1 tablet (40 mg total) by mouth 2 (two) times daily before meals.      ferrous sulfate 325 (65 FE) MG Oral Tab EC Take 1 tablet (325 mg total) by mouth 2 (two) times daily with meals.                 Discharge Diet: As tolerated    Discharge Activity: As tolerated       Discharge Medications        START taking these medications        Instructions Prescription details   HYDROcodone-acetaminophen 5-325 MG Tabs  Commonly known as: Norco  Notes to patient: FOR PAIN -- DO NOT TAKE AT SAME TIME OF TYLENOL BECAUSE THIS MEDICINE HAS TYLENOL IN IT      Take 1 tablet by mouth every 4 (four) hours as needed.   Quantity: 20 tablet  Refills: 0            CONTINUE taking these medications        Instructions Prescription details   ferrous sulfate 325 (65 FE) MG Tbec      Take 1 tablet (325 mg total) by mouth 2 (two) times daily with meals.   Refills: 0     pantoprazole 40 MG Tbec  Commonly known as: Protonix      Take 1 tablet (40 mg total) by mouth 2 (two) times daily before meals.   Quantity: 180 tablet  Refills: 3               Where to Get Your Medications        These medications were sent to [id number] - [place], [place] - [address] [phone], [phone]  [address], [facility]  [address]      Phone: [phone]   HYDROcodone-acetaminophen 5-325 MG Tabs         Follow up:      Follow-up Information       Demetrio Dickinson MD Follow up.    Specialty: Surgical Oncology  Contact information:  [address] [facility] [address] [phone]               Misha Goodson MD. Schedule an appointment as soon as possible for a visit in 1 week(s).    Specialty: Family Medicine  Why: Your provider Dr Misha Goodson stated that they will give you a call to schedule your Hospital follow-up appointment once you are home.  If you do not hear from them within a day or two, please call them to schedule your appointment to be within 1 week.  Contact information:  [address] [place] [address] [address]  [facility] [address] [phone]                             Follow up Labs and imaging:         Other Discharge Instructions:         Gastrectomy Discharge Instructions  Thank you for choosing the Surgical Oncology team at [facility].    You just had a surgery (gastrectomy) that removed part or all of your stomach.   Managing Your Pain  Follow the guidelines below to help manage your pain at home:  Take your medications as directed and as needed. You may also take 1000 mg of acetaminophen (Tylenol®) every 6 hours as needed for pain.  Call your doctor if the medication prescribed for you doesn't ease your pain.  Don't drive or drink alcohol while you're taking prescription pain medication  Take enough medication to do your exercises comfortably. However, it's normal for your pain to increase a little as you start to be more active.  Keep track of when you take your pain medication. It works best 30 to 45 minutes after you take it. Taking it when your pain first begins is better than waiting for the pain to get worse.  Pain medication may cause constipation  Managing Constipation  Talk with your nurse about how to manage constipation. You can also follow the guidelines below.  Go to the bathroom at the same  time every day.   Exercise. Walking is an excellent form of exercise.  Drink 8 (8-ounce) glasses (2 liters) of liquids daily, if you can. Drink water, juices (such as prune juice), soups, ice cream shakes, and other drinks that don't have caffeine.   If you haven't had a bowel movement in 3 days, contact our office.  Caring for Your Incision  It's normal for the skin below your incision to feel numb. This happens because some of the nerves were cut during your surgery. The numbness will go away over time.  Your surgeon used dissolvable sutures (stitches) beneath the skin, and they will not need to be removed.   If the area around your incision is red, puffy, or if you have any drainage from your incision, contact our office.  Eating and Drinking   After your gastrectomy, the way you eat and digest food will change. Your stomach may be smaller. You will not be able to eat as much as you did before your surgery. Eat at least 5-6 small meals each day.   Drink plenty of liquids. Drink most of your liquids at least one hour before or one hour after eating. Don't drink more than 4 ounces of liquids with your meals. Don't drink alcohol until you check with your surgeon.  Showering  You may shower 48 hours after surgery. When you shower, use soap to gently wash your incision. After you shower, pat the area dry with a clean towel. Don't rub over your incision. Leave your incision uncovered, unless there's drainage.  Avoid tub baths until your surgeon says it's okay.  Activity and Exercise  Remember, recovery after surgery takes several weeks. It is common to feel tired or fatigued. Rest as needed.   Doing aerobic exercise, such as walking and stair climbing, will help you gain strength and feel better. Gradually increase the distance you walk. Rest or stop as needed.  Don't lift anything heavier than 10 pounds for at least 8 weeks after your surgery or until your surgeon says it's okay.   Avoid strenuous activity and  exercises until your surgeon says it's okay.  Ask your surgeon when it is okay for you to drive.   Ask your surgeon when you can return to work.  When to Call:  Contact our office if you experience the following symptoms:  Fever of 100.4° F (38°C) or higher  Cloudy or smelly drainage from the incision site  The skin around your incision is warm/red/swollen  Sudden increase in pain or new pain  Shaking chills  Fast pulse  Shortness of breath or chest pain  Nausea or vomiting.   Diarrhea  Constipation that isn't relieved in 3 days  Signs of a bladder infection (urinating more often than usual, burning while urinating, bleeding or hesitancy while urinating).  Any new or unexplained symptoms    Our office will contact you for a follow up appointment.     Please arrive at the following location:  [place]: [address] [address]  [place] [facility]: [address] [place] [address]  Please call us at [phone] if you are unable to make it to your appointment or if you have any questions.             Time spent:  > 30 minutes    CARLOS A DOMINGUEZ MD  12/19/2024

## 2024-12-19 NOTE — PLAN OF CARE
Patient alert and oriented X4, vitals stable. Pain being managed with [place]. Saline locked. Tolerating diet, denies nausea and vomiting. Seen by dietician. Voiding freely. States not passing flatus, MD aware. Independent in room. Cleared for discharge. Discharge instructions reviewed with patient at bedside including need for follow up appointments; new and continued medications; incision care; and when to seek medical attention. PIV removed. Escorted to lobby via wheelchair.    Problem: Patient Centered Care  Goal: Patient preferences are identified and integrated in the patient's plan of care  Description: Interventions:  - What would you like us to know as we care for you?   - Provide timely, complete, and accurate information to patient/family  - Incorporate patient and family knowledge, values, beliefs, and cultural backgrounds into the planning and delivery of care  - Encourage patient/family to participate in care and decision-making at the level they choose  - Honor patient and family perspectives and choices  Outcome: Adequate for Discharge        Problem: PAIN - ADULT  Goal: Verbalizes/displays adequate comfort level or patient's stated pain goal  Description: INTERVENTIONS:  - Encourage pt to monitor pain and request assistance  - Assess pain using appropriate pain scale  - Administer analgesics based on type and severity of pain and evaluate response  - Implement non-pharmacological measures as appropriate and evaluate response  - Consider cultural and social influences on pain and pain management  - Manage/alleviate anxiety  - Utilize distraction and/or relaxation techniques  - Monitor for opioid side effects  - Notify MD/LIP if interventions unsuccessful or patient reports new pain  - Anticipate increased pain with activity and pre-medicate as appropriate  Outcome: Adequate for Discharge     Problem: RISK FOR INFECTION - ADULT  Goal: Absence of fever/infection during anticipated neutropenic  period  Description: INTERVENTIONS  - Monitor WBC  - Administer growth factors as ordered  - Implement neutropenic guidelines  Outcome: Adequate for Discharge

## 2024-12-20 ENCOUNTER — TELEPHONE (OUTPATIENT)
Dept: SURGERY | Facility: CLINIC | Age: 64
End: 2024-12-20

## 2024-12-20 ENCOUNTER — PATIENT OUTREACH (OUTPATIENT)
Dept: CASE MANAGEMENT | Age: 64
End: 2024-12-20

## 2024-12-20 ENCOUNTER — TELEPHONE (OUTPATIENT)
Dept: FAMILY MEDICINE CLINIC | Facility: CLINIC | Age: 64
End: 2024-12-20

## 2024-12-20 NOTE — TELEPHONE ENCOUNTER
I think this is okay. She will have follow up with surg/onc on 12/31 and based on that appointment we can make adjustments in timing if needed.

## 2024-12-20 NOTE — PAYOR COMM NOTE
--------------  DISCHARGE REVIEW    Payor: [employer]UL[employer]ADY  Subscriber #:  [id number]  Authorization Number: [id number]    Admit date: 24  Admit time:  11:22 AM  Discharge Date: 2024  2:34 PM     Admitting Physician: Demetrio Dickinson MD  Attending Physician:  No att. providers found  Primary Care Physician: Misha Goodson MD          Discharge Summary Notes        Discharge Summary signed by Vero No MD at 2024  1:26 PM       Author: Vero No MD Specialty: HOSPITALIST Author Type: Physician    Filed: 2024  1:26 PM Date of Service: 2024  1:20 PM Status: Signed    : Vero No MD (Physician)         [facility]  part of [facility]    Discharge Summary    Mikayla Mondragon Patient Status:  Inpatient    1960 MRN [id number]   Location [facility] 4// Attending Vero No MD   Hosp Day # 3 PCP Misha Goodson MD     Date of Admission: 2024   Date of Discharge: 2024    Hospital Discharge Diagnoses: Acute GI Bleed     Lace+ Score: 61  59-90 High Risk  29-58 Medium Risk  0-28   Low Risk.    TCM Follow-Up Recommendation:  LACE > 58: High Risk of readmission after discharge from the hospital.        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 Patient is stable for discharge.     Discharge Physical Exam:   Physical Exam:    General: No acute distress.   Respiratory: Clear to auscultation bilaterally. No wheezes. No rhonchi.  Cardiovascular: S1, S2. Regular rate and rhythm. No murmurs, rubs or gallops.   Abdomen: Soft, nontender, nondistended.   Positive bowel sounds. No rebound or guarding.  Neurologic: No focal neurological deficits.   Musculoskeletal: Moves all extremities.    Hospital Course:     GIST tumor with PUD  Surgical oncology on consult.   S/p robotic assisted laparoscopic excision of gastric mass, partial gastrectomy and upper endoscopy 12/16   Postgastrectomy diet  per surgery   Path pending   Home today  Heplock IV  Pain control with norco   OOB       >35min spent, >50% spent counseling and coordinating care in the form of educating pt/family and d/w consultants and staff. Most of the time spent discussing the above plan.     Complications: none    Consultants         Provider   Role Specialty     Jeffrey Danielle MD      Consulting Physician HOSPITALIST          Surgical Procedures       Case IDs Date Procedure Surgeon Location Status    [id number] 12/16/24 XI robot-assisted, laparoscopic, excision of gastric mass, partial gastrectomy, upper endoscopy Demetrio Dickinson MD [facility] MAIN OR Comp          Pending Labs       Order Current Status    PDGFRA Analysis in GISTs In process    Specimen to Pathology Tissue In process            Discharge Plan:   Discharge Condition: Stable    Current Discharge Medication List        New Orders    Details   HYDROcodone-acetaminophen 5-325 MG Oral Tab Take 1 tablet by mouth every 4 (four) hours as needed.           Home Meds - Unchanged    Details   pantoprazole 40 MG Oral Tab EC Take 1 tablet (40 mg total) by mouth 2 (two) times daily before meals.      ferrous sulfate 325 (65 FE) MG Oral Tab EC Take 1 tablet (325 mg total) by mouth 2 (two) times daily with meals.                 Discharge Diet: As tolerated    Discharge Activity: As tolerated       Discharge Medications        START taking these medications        Instructions Prescription details   HYDROcodone-acetaminophen 5-325 MG Tabs  Commonly known as: Norco  Notes to patient: FOR PAIN -- DO NOT TAKE AT SAME TIME OF TYLENOL BECAUSE THIS MEDICINE HAS  TYLENOL IN IT      Take 1 tablet by mouth every 4 (four) hours as needed.   Quantity: 20 tablet  Refills: 0            CONTINUE taking these medications        Instructions Prescription details   ferrous sulfate 325 (65 FE) MG Tbec      Take 1 tablet (325 mg total) by mouth 2 (two) times daily with meals.   Refills: 0     pantoprazole 40 MG Tbec  Commonly known as: Protonix      Take 1 tablet (40 mg total) by mouth 2 (two) times daily before meals.   Quantity: 180 tablet  Refills: 3               Where to Get Your Medications        These medications were sent to [id number] - [place], [place] - [address] [facility] [phone], [phone]  [address] [facility] [address]      Phone: [phone]   HYDROcodone-acetaminophen 5-325 MG Tabs         Follow up:      Follow-up Information       Demetrio Dickinson MD Follow up.    Specialty: Surgical Oncology  Contact information:  [address] [facility] [address] [phone]               Misha Goodson MD. Schedule an appointment as soon as possible for a visit in 1 week(s).    Specialty: Family Medicine  Why: Your provider Dr Misha Goodson stated that they will give you a call to schedule your Hospital follow-up appointment once you are home.  If you do not hear from them within a day or two, please call them to schedule your appointment to be within 1 week.  Contact information:  [address] [place] [address] [phone]                             Follow up Labs and imaging:         Other Discharge Instructions:         Gastrectomy Discharge Instructions  Thank you for choosing the Surgical Oncology team at [facility].    You just had a surgery (gastrectomy) that removed part or all of your stomach.   Managing Your Pain  Follow the guidelines below to help manage your pain at home:  Take your medications as directed and as needed. You may also take 1000 mg of acetaminophen (Tylenol®) every 6 hours as needed for pain.  Call your doctor if  the medication prescribed for you doesn't ease your pain.  Don't drive or drink alcohol while you're taking prescription pain medication  Take enough medication to do your exercises comfortably. However, it's normal for your pain to increase a little as you start to be more active.  Keep track of when you take your pain medication. It works best 30 to 45 minutes after you take it. Taking it when your pain first begins is better than waiting for the pain to get worse.  Pain medication may cause constipation  Managing Constipation  Talk with your nurse about how to manage constipation. You can also follow the guidelines below.  Go to the bathroom at the same time every day.   Exercise. Walking is an excellent form of exercise.  Drink 8 (8-ounce) glasses (2 liters) of liquids daily, if you can. Drink water, juices (such as prune juice), soups, ice cream shakes, and other drinks that don't have caffeine.   If you haven't had a bowel movement in 3 days, contact our office.  Caring for Your Incision  It's normal for the skin below your incision to feel numb. This happens because some of the nerves were cut during your surgery. The numbness will go away over time.  Your surgeon used dissolvable sutures (stitches) beneath the skin, and they will not need to be removed.   If the area around your incision is red, puffy, or if you have any drainage from your incision, contact our office.  Eating and Drinking   After your gastrectomy, the way you eat and digest food will change. Your stomach may be smaller. You will not be able to eat as much as you did before your surgery. Eat at least 5-6 small meals each day.   Drink plenty of liquids. Drink most of your liquids at least one hour before or one hour after eating. Don't drink more than 4 ounces of liquids with your meals. Don't drink alcohol until you check with your surgeon.  Showering  You may shower 48 hours after surgery. When you shower, use soap to gently wash your  incision. After you shower, pat the area dry with a clean towel. Don't rub over your incision. Leave your incision uncovered, unless there's drainage.  Avoid tub baths until your surgeon says it's okay.  Activity and Exercise  Remember, recovery after surgery takes several weeks. It is common to feel tired or fatigued. Rest as needed.   Doing aerobic exercise, such as walking and stair climbing, will help you gain strength and feel better. Gradually increase the distance you walk. Rest or stop as needed.  Don't lift anything heavier than 10 pounds for at least 8 weeks after your surgery or until your surgeon says it's okay.   Avoid strenuous activity and exercises until your surgeon says it's okay.  Ask your surgeon when it is okay for you to drive.   Ask your surgeon when you can return to work.  When to Call:  Contact our office if you experience the following symptoms:  Fever of 100.4° F (38°C) or higher  Cloudy or smelly drainage from the incision site  The skin around your incision is warm/red/swollen  Sudden increase in pain or new pain  Shaking chills  Fast pulse  Shortness of breath or chest pain  Nausea or vomiting.   Diarrhea  Constipation that isn't relieved in 3 days  Signs of a bladder infection (urinating more often than usual, burning while urinating, bleeding or hesitancy while urinating).  Any new or unexplained symptoms    Our office will contact you for a follow up appointment.     Please arrive at the following location:  [place]: [address] [place] [address]  [facility]: [address] [place] [address]  Please call us at [phone] if you are unable to make it to your appointment or if you have any questions.             Time spent:  > 30 minutes    VERO DOMINGUEZ MD  12/19/2024      Electronically signed by Vero Dominguez MD on 12/19/2024  1:26 PM         REVIEWER COMMENTS

## 2024-12-20 NOTE — TELEPHONE ENCOUNTER
Post op call made to patient. Patient states she is doing well. Denies fevers, no nausea or vomiting. States pain is mild, 1-2 on scale 0-10, controlled with PRN medication. Review pain medications instructions and pain management education with patient. Patient is tolerating activity without complaints. No concerns with surgical sites. Wound care instructions provided. Path reviewed with patient by Dr. Dickinson and to be reviewed again at post-op appointment.    Post op appointment scheduled with Dr. Dickinson on 12/31/24 at 11:15 am.    Patient agrees to call if any problems or concerns.

## 2024-12-20 NOTE — PROGRESS NOTES
NCM attempted to reach the patient to complete a [facility] follow up call. Left message to call back. [facility] provided direct contact info at [phone].

## 2024-12-20 NOTE — PROGRESS NOTES
Transitional Care Management   Discharge Date: 24  Contact Date: 2024    Assessment:  TCM Initial Assessment    General:  Assessment completed with: Patient  Patient Subjective: Spoke with patient who reports she is doing good since leaving the hospital. She reports incisional pain a 1/10 controlled with the prescribed Norco. The patient denies chest pain, shortness of breath, fever, chills, nausea, vomiting, diarrhea. She does report concern for constipation. She denies inability to pass gas. The patient does report when she eats she notices that it takes longer to digest. She reports she is eating smaller meals. Staying hydrated. Up and walking when she is able to. Has an incentive spirometer and states she has been using it a few times a day. Patient reports her incision looks fine and denies any erythema, drainage, or foul odor.  Chief Complaint: Acute GI Bleed  Verify patient name and  with patient/ caregiver: Yes    Hospital Stay/Discharge:  Tell me what you understand of why you were in the hospital or emergency department: Patient states she had part of her stomach removed  Prior to leaving the hospital were your Discharge Instructions reviewed with you?: Yes  Did you receive a copy of your written Discharge Instructions?: Yes  What questions do you have about your Discharge Instructions?: pain management instructions  Do you feel better or worse since you left the hospital or emergency department?: Better    Follow - Up Appointment:  Do you have a follow-up appointment?: Yes  Date: 24  Physician: Dr. Dickinson  Are there any barriers to getting to your follow-up appointment?: No    Home Health/DME:  Prior to leaving the hospital was Home Health (HH) arranged for you?: N/A  Are HH needs identified by staff during the assessment?: No     Prior to leaving the hospital or emergency department was Durable Medical Equipment (DME), medical supplies, or infusions arranged for you?: Yes  Have you  received your DME/supplies/infusions?: Yes  Do you have questions about using your DME/supplies/infusions?: No     Medications/Diet:  Did any of your medications change, during or after your hospital stay or ED visit?: Yes  Do you have your new or updated medications?: Yes  Do you understand what your medications are for and possible side effects?: Yes  Are there any reasons that keep you from taking your medication as prescribed?: No  Any concerns about medication refills?: No    Were you given a different diet per your Discharge Instructions?: Yes  Diet Type: eat smaller meals/ 5-6 darien a day  Reason: Gastrectomy  Are there any barriers to following that diet?: No     Questions/Concerns:  Do you have any questions or concerns that have not been discussed?: No         Nursing Interventions:    NCM advised patient to try to increase the amount of times she is using the incentive spirometer up to 10 times per hour while awake, if possible--If watching TV, use it at every commercial break.     NCM provided education on signs and symptoms of infection/ reviewed postop discharge instructions with the patient    NCM reviewed ways to alleviate constipation-- patient states she has prune juice and Colace at home. She states today she drank a hot beverage and started passing more gas so she feels a bm is coming. She will try some prune juice as well.     POSTOP appointment scheduled for 12/31/2024.     The patient is scheduled for a follow up appointment with Osirsi Sandy on 01/13/2025. This appointment is outside the recommended TCM time frame. NCM attempted to schedule a sooner appointment but no availability within the recommended TCM time frame. A telephone encounter has been sent to clinical staff to assist to assist with a sooner appointment if able to.      All d/c instructions reviewed with pt.  Reviewed when to call MD vs when to go to ER/call 911.  Educated pt on the importance of taking all meds as prescribed  as well as close f/u with PCP/specialists.  Pt verbalized understanding and will contact office with any further questions or concerns.       Medication Review:   Current Outpatient Medications   Medication Sig Dispense Refill    HYDROcodone-acetaminophen 5-325 MG Oral Tab Take 1 tablet by mouth every 4 (four) hours as needed. 20 tablet 0    pantoprazole 40 MG Oral Tab EC Take 1 tablet (40 mg total) by mouth 2 (two) times daily before meals. 180 tablet 3    ferrous sulfate 325 (65 FE) MG Oral Tab EC Take 1 tablet (325 mg total) by mouth 2 (two) times daily with meals.       Did patient review medications using current pill bottles and not just a medication list?  Yes  Discharge medications reviewed/discussed/and reconciled against outpatient medications with patient.  Any changes or updates to medications sent to primary care provider.  Patient Acknowledged          Follow-up Appointments:  Your appointments       Date & Time Appointment Department ([place])    Dec 31, 2024 11:15 AM CST Post Op Visit with Demetrio Dickinson MD [facility] ([facility] [place])        Jan 13, 2025 8:30 AM CST Follow Up Visit with Osiris Sandy PA-C [address] ([facility] [address])    Contact your primary care provider if your insurance requires a referral.    Please arrive 15 minutes prior to your scheduled appointment. Be sure to bring your current Insurance card, Photo ID, and medication bottles or a list of your current medications.      A 24 hour notice is required to cancel any appointment or you may be charged a $40 No Show Fee.     Important: 24 hour notice is required to cancel any appointment or you may be charged a $40 No Show Fee. Please notify your physician office.               [address]  [address]  [address]  [phone] [facility]  [facility] [place] [address] [address] [phone]            Transitional Care Clinic  Was TCC Ordered: No      Primary Care Provider (If no TCC appointment)  Does patient already have a PCP appointment scheduled? Yes  Nurse Care Manager Attempted to schedule PCP office TCM appointment with patient           Specialist  Does the patient have any other follow-up appointment(s) that need to be scheduled? No   -If yes: Nurse Care Manager reviewed upcoming specialist appointments with patient: Yes   -Does the patient need assistance scheduling appointment(s): No    CCM referral placed:  Not Applicable

## 2024-12-20 NOTE — TELEPHONE ENCOUNTER
Spoke to patient for TCM today.      The patient is scheduled for a follow up appointment with Osiris Sandy on 01/13/2025. This appointment is outside the recommended TCM time frame. [facility] attempted to schedule a sooner appointment but no availability within the recommended TCM time frame.     TCM appointment recommended by 01/02/2024 as patient is a High risk for readmission.  Please advise.    BOOK BY DATE (last date for TCM): 01/02/2024     Clinical staff:  Please follow-up with patient and try to get them to schedule as patient would greatly benefit from TCM appointment.  Thank you!     Future Appointments   Date Time Provider Department Center   12/31/2024 11:15 AM Demetrio Dickinson MD [facility]   1/13/2025  8:30 AM Osiris Sandy PA-C [facility] [address]

## 2024-12-21 ENCOUNTER — MOBILE ENCOUNTER (OUTPATIENT)
Dept: SURGERY | Facility: CLINIC | Age: 64
End: 2024-12-21

## 2024-12-21 DIAGNOSIS — N30.00 ACUTE CYSTITIS WITHOUT HEMATURIA: Primary | ICD-10-CM

## 2024-12-21 RX ORDER — SULFAMETHOXAZOLE AND TRIMETHOPRIM 800; 160 MG/1; MG/1
1 TABLET ORAL 2 TIMES DAILY
Qty: 10 TABLET | Refills: 0 | Status: SHIPPED | OUTPATIENT
Start: 2024-12-21 | End: 2024-12-26

## 2024-12-23 ENCOUNTER — TELEPHONE (OUTPATIENT)
Age: 64
End: 2024-12-23

## 2024-12-23 NOTE — TELEPHONE ENCOUNTER
Spoke to patient regarding message received with c/o of constipation. Patient states she was able to have a normal bowel movement today, 12/23/ at 11 am. Notified patient that Dr. Dickinson was notified and recommends patient to take OTC Miralax for future concerns of constipation between now and her post-op appointment. Informed patient that if she had not had a bowel movement and the Miralax did not work, then to next step would be an OTC tap water enema. Advised patient to continue current medical management and take the OTC Miralax for future constipation issues. Patient confirms she had called and spoken with Dr. Hall regarding c/o UTI and Rx ordered. Patient reports UTI symptoms resolved and continuing taking Rx. All questions answered to best of my ability, advised to call back for any future questions or concerns. Patient verbalized understanding.

## 2024-12-27 ENCOUNTER — PATIENT MESSAGE (OUTPATIENT)
Dept: GASTROENTEROLOGY | Facility: CLINIC | Age: 64
End: 2024-12-27

## 2024-12-31 ENCOUNTER — OFFICE VISIT (OUTPATIENT)
Age: 64
End: 2024-12-31
Payer: COMMERCIAL

## 2024-12-31 DIAGNOSIS — C49.A2 MALIGNANT GASTROINTESTINAL STROMAL TUMOR (GIST) OF STOMACH (HCC): Primary | ICD-10-CM

## 2024-12-31 PROCEDURE — 99024 POSTOP FOLLOW-UP VISIT: CPT | Performed by: SURGERY

## 2024-12-31 NOTE — PROGRESS NOTES
[facility]    Patient Name:  Mikayla Mondragon   YOB: 1960   Gender:  Female   Appt Date:  12/31/2024   Provider:  Demetrio Dickinson MD   Insurance:  [facility]     PATIENT PROVIDERS  Referring Provider: No ref. provider found   Address: No referring provider defined for this encounter.   Phone #: N/A    Primary Care Provider:Misha Goodson MD   Address: [address] [place] [address]   Phone #: [phone]       CHIEF COMPLAINT  No chief complaint on file.       PROBLEMS  Reviewed   Patient Active Problem List   Diagnosis    Hemorrhoids    Dense breasts    Family history of breast cancer in sister    GI bleed    Gastrointestinal hemorrhage, unspecified gastrointestinal hemorrhage type    Anemia, unspecified type    Acute blood loss anemia    Abnormal CT of the abdomen    Gastrointestinal stromal tumor (GIST) (HCC)    Benign gastrointestinal stromal tumor (GIST)        History of Present Illness:  Patient presents for postoperative visit.  Very pleasant otherwise healthy six 4-year-old female who was diagnosed with a large gastrointestinal stromal tumor of the stomach after presenting with bleeding.  She underwent a robotic assisted partial gastrectomy, resection on 12/16/2024.  Pathology consistent with a 7.1 cm gastrointestinal stromal tumor, mitotic rate 2 per 5 mm², G1 low-grade, low risk, all margins negative.  Doing well from postoperative standpoint.  Pain controlled.  Tolerating diet without issues.     Vital Signs:  LMP 08/12/2013      Medications Reviewed:    Current Outpatient Medications:     HYDROcodone-acetaminophen 5-325 MG Oral Tab, Take 1 tablet by mouth every 4 (four) hours as needed., Disp: 20 tablet, Rfl: 0    pantoprazole 40 MG Oral Tab EC, Take 1 tablet (40 mg total) by mouth 2 (two) times daily before meals., Disp: 180 tablet, Rfl: 3    ferrous sulfate 325 (65 FE) MG Oral Tab EC, Take 1 tablet (325 mg total) by mouth 2 (two)  times daily with meals., Disp: , Rfl:      Allergies Reviewed:  Allergies[1]     History:  Reviewed:  Past Medical History:    Amenorrhea    Last period    Anxiety    Job threatened/[place]oved [place]-in counseling/fitness program    Chicken pox    Disorder of thyroid    in high school    Hemorrhoids    Dr. Lisette Coronado     History of hepatitis    History of stomach ulcers    Hypothyroidism    Lipid screening    Measles    Mumps    Viral hepatitis C    Visual impairment    glasses      Reviewed:  Past Surgical History:   Procedure Laterality Date    Colonoscopy  02/01/2008    Dr. Lisette Coronado, Repeat colonoscopy in five years. Family history of coloncancer     Colonoscopy N/A 03/24/2021    Procedure: COLONOSCOPY;  Surgeon: Nehemias Valdez MD;  Location[facility] ENDO    Needle biopsy right      benign     Tonsillectomy  1982      Reviewed Social History:  Social History     Socioeconomic History    Marital status: Single   Tobacco Use    Smoking status: Never    Smokeless tobacco: Never   Vaping Use    Vaping status: Never Used   Substance and Sexual Activity    Alcohol use: Not Currently     Comment: 1 drink a month    Drug use: No    Sexual activity: Never   Other Topics Concern    Caffeine Concern No    Stress Concern No    Weight Concern No    Special Diet No    Exercise Yes    Seat Belt Yes      Reviewed:  Family History   Problem Relation Age of Onset    Heart Disease Father         Coronary Artery Disease     Heart Surgery Father         Coronary artery bypass grafting     Heart Disorder Father         Triple bypass 1983, no complications since    Lipids Father         Hyperlipidemia     Dementia Father     Hypertension Mother     Psychiatric Mother         Severe anxiety 1920's, Shock therapy 1962    Other (Pschyologicals problems) Mother     Depression Mother     Cancer Paternal Grandmother         Colon cancer 1993    Cancer Paternal Grandfather         Brain cancer 1959    Cancer Sister     Breast Cancer  Sister 57    Stroke Other         Grandmother     Colon Cancer Other         Family history of         Review of Systems:  Review of Systems   Constitutional:  Negative for activity change, appetite change, chills, fatigue, fever and unexpected weight change.   HENT:  Negative for congestion and trouble swallowing.    Eyes:  Negative for discharge and redness.   Respiratory:  Negative for cough, chest tightness and shortness of breath.    Cardiovascular:  Negative for chest pain and leg swelling.   Gastrointestinal:  Negative for abdominal distention, abdominal pain and nausea.   Endocrine: Negative for polydipsia and polyuria.   Genitourinary:  Negative for difficulty urinating and dysuria.   Musculoskeletal:  Negative for myalgias.   Skin:  Negative for color change and pallor.   Allergic/Immunologic: Negative for immunocompromised state.   Neurological:  Negative for syncope and weakness.   Hematological:  Does not bruise/bleed easily.   Psychiatric/Behavioral:  Negative for agitation and confusion.         Physical Examination:  Physical Exam  Constitutional:       Appearance: She is well-developed.   HENT:      Head: Normocephalic.   Eyes:      Pupils: Pupils are equal, round, and reactive to light.   Cardiovascular:      Rate and Rhythm: Normal rate and regular rhythm.      Heart sounds: No murmur heard.  Pulmonary:      Effort: Pulmonary effort is normal. No respiratory distress.      Breath sounds: Normal breath sounds. No wheezing or rales.   Abdominal:      General: There is no distension.      Palpations: Abdomen is soft.      Tenderness: There is no abdominal tenderness.   Musculoskeletal:      Cervical back: Normal range of motion.   Skin:     General: Skin is warm and dry.   Neurological:      Mental Status: She is alert and oriented to person, place, and time.        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  Tumor extends  to 0.5 mm from closest specimen margin.  All surgical margins negative for tumor.  Five lymph nodes with no tumor identified (0/5).  pT3,N0.     Comment:  Sections demonstrate a well-circumscribed spindle cell tumor.  The spindle cells are arranged in a predominantly fascicular pattern and demonstrate minimal cytologic atypia.  The mitotic rate is low (2 mitoses per 5 mm²).  No necrosis is identified.  The tumor cells are positive for  and CD34; the Ki-67 proliferation index is low (< 2%).  These findings support the above diagnosis     Paraffin-embedded tumor (block A1) has been submitted for c-KIT and Platelet-Derived Growth Factor Receptor (PDGFR) mutation analysis.  Results will be conveyed as a linked report.        The immunohistochemical stains interpreted in this case demonstrate appropriate reactivity of positive controls.  The stains were performed on formalin-fixed, paraffin-embedded tissue sections with each antibody analysis being performed on a separate slide.        Electronically signed by Silvano Guerra MD on 12/19/2024 at  3:10 PM        Synoptic Report     GASTROINTESTINAL STROMAL TUMOR (GIST): Resection   8th Edition - Protocol posted: 12/14/2022GASTROINTESTINAL STROMAL TUMOR (GIST): RESECTION - All Specimens  SPECIMEN   Procedure  Partial gastrectomy   TUMOR   Tumor Focality  Unifocal   Tumor Site  Stomach: Lesser curvature   Histologic Type  Gastrointestinal stromal tumor, spindle cell type   Tumor Size  Greatest Dimension (Centimeters): 7.1 cm   Mitotic Rate  2 mitoses per 5 mm2   Histologic Grade  G1, low grade   Necrosis  Not identified   Treatment Effect  No known presurgical therapy   Risk Assessment  Low risk   MARGINS   Margin Status  All margins negative for GIST   Closest Margin(s) to GIST  Omental (radial)   Distance from GIST to Closest Margin  0.5 mm   REGIONAL LYMPH NODES   Regional Lymph Node Status  All regional lymph nodes negative for tumor   Number of Lymph Nodes  Examined  5   pTNM CLASSIFICATION (AJCC 8th Edition)   Reporting of pT, pN, and (when applicable) pM categories is based on information available to the pathologist at the time the report is issued. As per the AJCC (Chapter 1, 8th Ed.) it is the managing physician's responsibility to establish the final pathologic stage based upon all pertinent information, including but potentially not limited to this pathology report.   pT Category  pT3   pN Category  pN0   .        Clinical Information      C49.A2 Malignant Gastrointestinal Stromal Tumor (GIST) of Stomach (Hcc).        Gross Description      The specimen is labeled \"Mondragon, partial gastrectomy, gastric tumor\" received in formalin. The specimen consists of a 111 gram unoriented bulging subserosal mass measuring 7.1 x 5.9 x 5.1 cm. The peripheral/mucosal margin is inked orange, the serosal surface is inked black. The specimen is serially sectioned to reveal a pink-tan whorled cut surface. The mass is <0.1 cm from the nearest peripheral/mucosal margin. No mucosal or serosal invasion is grossly identified. Representative sections are submitted as follows: A1-A5, mass with closest peripheral/mucosal margins including mucosal side and serosal side; A6-A8, additional sections of mass (serosal side); A9-A10, additional sections of mass (mucosal side). (jq)      Silvano Guerra M.D./trent         Assessment / Plan:  Patient doing well from postoperative standpoint.  Reviewed pathology, will review in tumor board  Follow-up in 6 months with repeat CT chest abdomen pelvis  Will discuss with medical oncology however do not believe there is role for adjuvant therapy    Demetrio Dickinson MD  [facility]  [facility]  [email or web address]        Follow Up:  No follow-ups on file.       Electronically Signed by: Demetrio Dickinson MD         [1] No Known Allergies

## 2025-01-02 ENCOUNTER — TELEPHONE (OUTPATIENT)
Dept: SURGERY | Facility: CLINIC | Age: 65
End: 2025-01-02

## 2025-01-02 NOTE — TELEPHONE ENCOUNTER
LVM, patient should complete CT last week of May or first week of June. Will reschedule follow-up appt with Dr. Dickinson for sooner appt once CT rescheduled.

## 2025-01-03 ENCOUNTER — LAB ENCOUNTER (OUTPATIENT)
Dept: LAB | Facility: HOSPITAL | Age: 65
End: 2025-01-03
Attending: PHYSICIAN ASSISTANT
Payer: COMMERCIAL

## 2025-01-03 DIAGNOSIS — R19.5 DARK STOOLS: ICD-10-CM

## 2025-01-03 LAB — HEMOCCULT STL QL: NEGATIVE

## 2025-01-03 PROCEDURE — 82272 OCCULT BLD FECES 1-3 TESTS: CPT

## 2025-01-09 ENCOUNTER — PATIENT MESSAGE (OUTPATIENT)
Age: 65
End: 2025-01-09

## 2025-01-09 ENCOUNTER — OFFICE VISIT (OUTPATIENT)
Dept: FAMILY MEDICINE CLINIC | Facility: CLINIC | Age: 65
End: 2025-01-09
Payer: COMMERCIAL

## 2025-01-09 VITALS
OXYGEN SATURATION: 98 % | RESPIRATION RATE: 16 BRPM | HEART RATE: 59 BPM | BODY MASS INDEX: 24.33 KG/M2 | WEIGHT: 155 LBS | HEIGHT: 67 IN | SYSTOLIC BLOOD PRESSURE: 122 MMHG | DIASTOLIC BLOOD PRESSURE: 78 MMHG

## 2025-01-09 DIAGNOSIS — R73.09 ELEVATED GLUCOSE: ICD-10-CM

## 2025-01-09 DIAGNOSIS — D50.0 IRON DEFICIENCY ANEMIA DUE TO CHRONIC BLOOD LOSS: ICD-10-CM

## 2025-01-09 DIAGNOSIS — D21.4 BENIGN GASTROINTESTINAL STROMAL TUMOR (GIST): Primary | ICD-10-CM

## 2025-01-09 PROBLEM — K92.2 GI BLEED: Status: RESOLVED | Noted: 2024-07-04 | Resolved: 2025-01-09

## 2025-01-09 PROCEDURE — 99214 OFFICE O/P EST MOD 30 MIN: CPT | Performed by: PHYSICIAN ASSISTANT

## 2025-01-09 NOTE — PROGRESS NOTES
Subjective:   Patient ID: Mikayla Mondragon is a 64 year old female.    HPI  Patient presents for follow-up of recent GIST tumor resection, partial gastrectomy, and EGD.  She tolerated procedure well and there were no complications.  She has since followed up with the surgical oncologist.  She has had a smooth recovery postop.  Her pathology will be reviewed with tumor board to determine the need for any adjuvant therapy.  Right now she is taking pantoprazole 40 mg twice daily and iron supplements.    States her incisions feel the best today  The 3 in incision of the LLQ bothers her a little bit  Her abdomen feels a bit bloated  She gets a sharp pain in the upper abdomen but goes away quickly  She is eating three times a day but still smaller portions  Her BM are better, still a little loose - had one stool that was black (occult stool test negative for blood)  She is drinking 10 glasses water/day  She is walking 1-2 miles/day - she gets tired easily but no pain    She is having left mid back/flank pain/aching  Worse if she is sitting for prolonged periods  No urinary symptoms    She is sleeping only 2-3 hours at a time  Not able to nap during the day  She cannot tolerate melatonin very well  She is feeling more emotional and teary - unsure if because of sleep deprivation, also the fact that she has never had to have surgery is overwhelming    She plans to retire this year  She is going to return to work on 1/20/25      History/Other:   Review of Systems   Constitutional:  Negative for chills, fatigue and fever.   HENT:  Negative for congestion, ear pain, rhinorrhea and sore throat.    Eyes:  Negative for visual disturbance.   Respiratory:  Negative for cough, shortness of breath and wheezing.    Cardiovascular:  Negative for chest pain, palpitations and leg swelling.   Gastrointestinal:  Positive for abdominal distention and abdominal pain. Negative for constipation, diarrhea, nausea and vomiting.   Genitourinary:   Negative for dysuria, frequency and hematuria.   Musculoskeletal:  Negative for arthralgias, gait problem and myalgias.   Skin:  Negative for rash.   Neurological:  Negative for weakness, light-headedness and headaches.   Hematological:  Negative for adenopathy.   Psychiatric/Behavioral:  Positive for sleep disturbance. Negative for dysphoric mood. The patient is not nervous/anxious.      Current Outpatient Medications   Medication Sig Dispense Refill    pantoprazole 40 MG Oral Tab EC Take 1 tablet (40 mg total) by mouth 2 (two) times daily before meals. 180 tablet 3    ferrous sulfate 325 (65 FE) MG Oral Tab EC Take 1 tablet (325 mg total) by mouth 2 (two) times daily with meals.       Allergies:Allergies[1]    Objective:   Physical Exam  Vitals and nursing note reviewed.   Constitutional:       General: She is not in acute distress.     Appearance: She is well-developed.   HENT:      Head: Normocephalic and atraumatic.   Cardiovascular:      Rate and Rhythm: Normal rate and regular rhythm.      Heart sounds: Normal heart sounds.   Pulmonary:      Effort: Pulmonary effort is normal.      Breath sounds: Normal breath sounds. No wheezing or rales.   Abdominal:      General: Bowel sounds are normal. There is distension (lower abdomen).      Palpations: Abdomen is soft. There is no mass.      Tenderness: There is abdominal tenderness in the suprapubic area. There is no guarding or rebound.      Comments: Well healing abdominal incisions   Musculoskeletal:      Cervical back: Normal range of motion and neck supple.   Lymphadenopathy:      Cervical: No cervical adenopathy.   Skin:     General: Skin is warm and dry.   Neurological:      Mental Status: She is alert.         Assessment & Plan:   1. Benign gastrointestinal stromal tumor (GIST)  S/p resection of tumor and partial gastrectomy. Patient has had an expectant post op course. Still dealing with some abdominal soreness and bloating and will monitor symptoms.  Continue PPI BID until current rx finished and for next refill will reduce to once daily dosing. Continue with postgastrectomy diet. Follow up in 3 months. Scan due in 5 months.     2. Iron deficiency anemia due to chronic blood loss  Reduce iron supplement to once a day and recheck in 3 months.   - CBC With Differential With Platelet; Future  - Iron And Tibc; Future  - Ferritin; Future    3. Elevated glucose  - Hemoglobin A1C [E]; Future             [1] No Known Allergies

## 2025-01-10 NOTE — TELEPHONE ENCOUNTER
Spoke to patient regarding messaged received. Patient confirms tolerating soft diet without issue. Reviewed diet instructions and advised to start advancing to general diet on Monday, 1/13. Informed patient to call back regarding any concerns or difficulty. Patient scheduled for follow-up on 6/3/2025. All questions answered, advised to call back for any future questions or concerns. Patient verbalized understanding.

## 2025-01-27 NOTE — CONSULTS
[facility]    Patient Name:  Mikayla Mondragon   YOB: 1960   Gender:  Female   Appt Date:  11/19/2024   Provider:  Demetrio Dickinson MD   Insurance:  [facility]     PATIENT PROVIDERS  Referring Provider: No ref. provider found   Address: No referring provider defined for this encounter.   Phone #: N/A    Primary Care Provider:Misha Goodson MD   Address: [address] [place] [address]   Phone #: [phone]       CHIEF COMPLAINT  Chief Complaint   Patient presents with    Consult        PROBLEMS  Reviewed   Patient Active Problem List   Diagnosis    Hemorrhoids    Dense breasts    Family history of breast cancer in sister    GI bleed    Gastrointestinal hemorrhage, unspecified gastrointestinal hemorrhage type    Anemia, unspecified type    Acute blood loss anemia    Abnormal CT of the abdomen        History of Present Illness:  Asked to evaluate patient and render opinion regarding surgical management of malignant gastrointestinal stromal tumor.  Very pleasant otherwise healthy six 4-year-old female who is being evaluated today for the above reason.  Patient initially presented with iron deficiency anemia and was evaluated by Dr. Donovan.She underwent an endoscopy on 7/5/2024 which revealed a clean-based gastric ulcer.  This was biopsied.  Initial biopsy consistent with gastric mucosa with mild edema without evidence of dysplasia or carcinoma.  Follow-up endoscopy was performed on 10/21/2024.  This returned to show a persistent 3 to 4 mm clean-based ulcer with heaped up surrounding mucosa.  Repeat biopsy was performed which was unremarkable.  Patient later underwent a CT scan which revealed a 6.7 x 5.6 cm heterogeneous mass along the lesser curvature of the stomach as well as a 2.1 x 1.4 cm low-attenuation lesion with a focal peripheral calcification of the lateral aspect of the left hepatic lobe.  This was followed up with an upper endoscopy and EUS  Subjective   Patient ID: Fco Barriga is a 9 m.o. male who presents with mother for Well Child (9 month well exam. ).  HPI  Questions or Concerns Raised Today Include:   At the end of last week he was spitting up more and fussier than normal. No vomiting/diarrhea. Today he is better     General Health: Infant overall is in good health.     Diet:   Formula   Fruits and Vegetables.   Meats.   Using baby foods and table foods.    Using cups.    Elimination: patterns are appropriate.     Sleep:   Patterns are appropriate. 9-7  Fco sleeps in a crib.    Developmental Activity:   Parents are reading to Fco  Social Language and Self-Help:   Object permanence   Plays peek-a-wong and pat-a-cake   Turns consistently when name is called   Becomes fussy when bored   Uses basic gestures (arms out to be picked up, waves bye bye)  Verbal Language:   Says You or Mama nonspecifically   Copies sounds that you make  Gross Motor:   Sits well without support   Pulls to standing   Crawls   Transitions well between lying and sitting  Fine Motor:   Picks up food and eats it   Picks up small objects with 3 fingers and thumb   Lets go of objects intentionally   [place] objects together    Childcare:  and doing well. Limited home and other kids.     Safety Assessment: Home is baby-proofed and uses a Car Seat.     Patient has not had any serious prior vaccine reactions.    Review of Systems    Objective   Ht 71.1 cm   Wt 10.1 kg   HC 48 cm   BMI 20.07 kg/m²     Physical Exam  Vitals and nursing note reviewed.   Constitutional:       General: He is active.      Appearance: Normal appearance. He is well-developed.   HENT:      Head: Normocephalic and atraumatic. Anterior fontanelle is flat.      Right Ear: Tympanic membrane and external ear normal.      Left Ear: Tympanic membrane and external ear normal.      Nose: Nose normal.      Mouth/Throat:      Mouth: Mucous membranes are moist.   Eyes:      General: Red reflex is present  bilaterally.      Conjunctiva/sclera: Conjunctivae normal.      Pupils: Pupils are equal, round, and reactive to light.   Cardiovascular:      Rate and Rhythm: Normal rate and regular rhythm.      Pulses: Normal pulses.      Heart sounds: Normal heart sounds. No murmur heard.  Pulmonary:      Effort: Pulmonary effort is normal.      Breath sounds: Normal breath sounds.   Abdominal:      General: Abdomen is flat. Bowel sounds are normal.      Palpations: Abdomen is soft.   Genitourinary:     Penis: Normal and circumcised.       Testes: Normal.      Rectum: Normal.   Musculoskeletal:         General: Normal range of motion.      Cervical back: Normal range of motion and neck supple.      Right hip: Negative right Ortolani and negative right Mcdonald.      Left hip: Negative left Ortolani and negative left Mcdonald.   Lymphadenopathy:      Cervical: No cervical adenopathy.   Skin:     General: Skin is warm and dry.      Turgor: Normal.   Neurological:      General: No focal deficit present.      Mental Status: He is alert.      Motor: No abnormal muscle tone.          Assessment/Plan   Diagnoses and all orders for this visit:  Encounter for well child visit at 9 months of age    Patient Instructions   Good to see you today     Fco is doing very well. Good growth and appropriate development  He is a sweet baby  You are doing a great job!    As for his fussiness last week, his ears are good on exam today as is the rest of his exam.   Continue to monitor. Call back with questions or concerns.     Continue good health habits - These are of primary importance for your child's optimal good health, growth, and development:   Good Nutrition - continue to offer purees and move towards solids as he tolerates. We discussed ways to introduce chunkier foods   Eat together as a family.    Floor time/play for at least an hour a day.    No Screen time - this promotes more imagination and development and less behavior concerns now and in  per Dr. Bermudez.  This returned to show 6.4 x 5.9 cm perigastric mass.  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     Vital Signs:  /74 (BP Location: Right arm, Patient Position: Sitting, Cuff Size: adult)   Pulse 67   Temp 97.5 °F (36.4 °C) (Temporal)   Resp 16   Ht 1.702 m (5' 7\")   Wt 73.6 kg (162 lb 3.2 oz)   LMP 08/12/2013   SpO2 98%   BMI 25.40 kg/m²      Medications Reviewed:    Current Outpatient Medications:     pantoprazole 40 MG Oral Tab EC, Take 1 tablet (40 mg total) by mouth 2 (two) times daily before meals., Disp: 180 tablet, Rfl: 3    ferrous sulfate 325 (65 FE) MG Oral Tab EC, Take 1 tablet (325 mg total) by mouth 2 (two) times daily with meals., Disp: , Rfl:      Allergies Reviewed:  Allergies[1]     History:  Reviewed:  Past Medical History:    Amenorrhea    Last period    Anxiety    Job threatened[place]oved [place]-in counseling/fitness program    Chicken pox    Disorder of thyroid    Hemorrhoids    Dr. Lisette Coronado     History of hepatitis    Hypothyroidism    Lipid screening    Measles    Mumps    Viral hepatitis C    Visual impairment      Reviewed:  Past Surgical History:   Procedure Laterality Date    Colonoscopy  02/01/2008    Dr. Lisette Coronado, Repeat colonoscopy in five years. Family history of coloncancer     Colonoscopy N/A 03/24/2021    Procedure: COLONOSCOPY;  Surgeon: Nehemias Valdez MD;  Location: [facility]    Needle biopsy right      benign     Tonsillectomy  1982      Reviewed Social History:  Social History     Socioeconomic History    Marital status: Single   Tobacco Use    Smoking status: Never    Smokeless tobacco: Never   Vaping Use    Vaping status: Never Used   Substance and Sexual Activity    Alcohol use: Not Currently     Comment: 1 drink a month    Drug use: No    Sexual activity: Never   Other Topics Concern    Caffeine Concern No    Stress Concern No    Weight  Concern No    Special Diet No    Exercise Yes    Seat Belt Yes      Reviewed:  Family History   Problem Relation Age of Onset    Heart Disease Father         Coronary Artery Disease     Heart Surgery Father         Coronary artery bypass grafting     Heart Disorder Father         Triple bypass 1983, no complications since    Lipids Father         Hyperlipidemia     Dementia Father     Hypertension Mother     Psychiatric Mother         Severe anxiety 1920's, Shock therapy 1962    Other (Pschyologicals problems) Mother     Depression Mother     Cancer Paternal Grandmother         Colon cancer 1993    Cancer Paternal Grandfather         Brain cancer 1959    Cancer Sister     Breast Cancer Sister 57    Stroke Other         Grandmother     Colon Cancer Other         Family history of         Review of Systems:  Review of Systems   Constitutional:  Negative for activity change, appetite change, chills, fatigue, fever and unexpected weight change.   HENT:  Negative for congestion and trouble swallowing.    Eyes:  Negative for discharge and redness.   Respiratory:  Negative for cough, chest tightness and shortness of breath.    Cardiovascular:  Negative for chest pain and leg swelling.   Gastrointestinal:  Negative for abdominal distention, abdominal pain and nausea.   Endocrine: Negative for polydipsia and polyuria.   Genitourinary:  Negative for difficulty urinating and dysuria.   Musculoskeletal:  Negative for myalgias.   Skin:  Negative for color change and pallor.   Allergic/Immunologic: Negative for immunocompromised state.   Neurological:  Negative for syncope and weakness.   Hematological:  Does not bruise/bleed easily.   Psychiatric/Behavioral:  Negative for agitation and confusion.         Physical Examination:  Physical Exam  Constitutional:       Appearance: She is well-developed.   HENT:      Head: Normocephalic.   Eyes:      Pupils: Pupils are equal, round, and reactive to light.   Cardiovascular:      Rate  the future   Continue to foster Good Sleeping habits   To be seen at next check up in 3 months     These habits will help you promote physical health, growth, and development in your baby.    Continue to enjoy!       and Rhythm: Normal rate and regular rhythm.      Heart sounds: Normal heart sounds. No murmur heard.  Pulmonary:      Effort: Pulmonary effort is normal. No respiratory distress.      Breath sounds: Normal breath sounds. No wheezing or rales.   Abdominal:      General: Bowel sounds are normal. There is no distension.      Palpations: Abdomen is soft.      Tenderness: There is no abdominal tenderness.   Musculoskeletal:      Cervical back: Normal range of motion.   Skin:     General: Skin is warm and dry.   Neurological:      Mental Status: She is alert and oriented to person, place, and time.      Deep Tendon Reflexes: Reflexes are normal and symmetric.        PROCEDURE: CT ABDOMEN + PELVIS (CONTRAST ONLY) (CPT=74177)     COMPARISON: [facility], CT CALCIUM SCORING OVER READ, 7/02/2024, 1:11 PM.     INDICATIONS: History of non-healing gastric ulcer revealed on an EGD of 10/21/2024.     TECHNIQUE: CT images of the abdomen and pelvis were obtained with non-ionic intravenous contrast material.  Automated exposure control for dose reduction was used. Adjustment of the mA and/or kV was done based on the patient's size. Use of iterative  reconstruction technique for dose reduction was used.  Dose information is transmitted to the ACR (American College of Radiology) NRDR (National Radiology Data Registry) which includes the Dose Index Registry.     FINDINGS:     The following findings were made:     1. There is a 6.7 x 5.6 by 5.6 cm well-defined lobulated heterogeneous though mainly low attenuated mass like density along the lesser curve of the stomach.  There there are a few clips at the periphery of this mass.  This could represent a primary  gastric malignancy with central ulceration or a gyn gastric ulcer with contained hematoma.  There is no CT evidence of gastric perforation.     2. Immediately superior and anterior to the aforementioned mass is a 2.4 by 1.4 cm low attenuated lesion  with a focal peripheral calcification at the lateral aspect of the left hepatic lobe.  This raises possibility of localized metastatic spread .  No  other hepatic lesions are identified.     3. There are a few small calcified fibroids within an otherwise normal appearing uterus.     The remainder of the examination is unremarkable.  Specifically, the lung bases are clear.  The visualized aspects of the spleen, pancreas, gallbladder, adrenals, kidneys, small bowel, colon, and remaining soft tissues demonstrated grossly normal CT  appearance for patient of this age.  There is no free fluid or organized fluid collection.  There is no significant adenopathy.               Impression   CONCLUSION: There is a 6.7 x 5.6 by 5.6 cm well-defined lobulated heterogeneous though mainly low attenuated mass like density along the lesser curve of the stomach.  There there are a few clips at the periphery of this mass.  This could represent a  primary gastric malignancy with central ulceration or a gyn gastric ulcer with contained hematoma.  There is no CT evidence of gastric perforation. Immediately superior and anterior to the aforementioned mass is a 2.4 by 1.4 cm low attenuated lesion with   a focal peripheral calcification at the lateral aspect of the left hepatic lobe.  This raises possibility of localized metastatic spread .  No other hepatic lesions are identified. Further evaluation with biopsy is recommended.          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     Comment:  CT of the abdomen and pelvis (11-1-2024), reported to show a 6.7 x 5.6 x 5.6 cm well-defined lobulated heterogeneous low attenuated mass along the lesser curve of  the stomach.   Immediately superior and anterior to the aforementioned mass, there was a 2.4 by 1.4 cm low attenuated lesion with a focal peripheral calcification at the lateral aspect of the left hepatic lobe.      Multiple sections of the perigastric mass core bipsies demonstrate bland spindle cells with faintly eosinophilic cytoplasm in a syncytial pattern; elongated nuclei ,and inconspicuous nucleoli;.  No evidence of increased mitotic rate, significant cytologic atypia, or necrosis is identified.     Immunohistochemical stains are performed for further collateralization and demonstrate the following:   The lesion is positive for  and CD34.  The lesion is negative for Actin and S100.  Ki-67 proliferation index is 2%.     Overall, the morphologic features along with the immunohistochemical findings, are consistent with gastric gastrointestinal stromal tumor (GIST), spindle cell type, with low malignant potential.     For  purposes, this case was reviewed by a second pathologist who concurred with the diagnosis.     The immunohistochemical stains interpreted in this case demonstrated appropriate reactivity of the positive controls. These stains were performed on formalin-fixed, paraffin-embedded tissue sections with each antibody analysis being performed on a separate slide.     Electronically signed by Kelin Bell MD on 11/13/2024 at  8:59 AM        Intraoperative consultation         1. Perigastric, touch preparation of core biopsy; immediate evaluation:   Additional tissue required.       2. Perigastric, touch preparation of core biopsy; immediate evaluation:   Additional tissue required.       Additional comments:  Three additional cores are received and submitted in formalin.         Clinical Information      K25.9 Gastric Ulcer, Unspecified Chronicity, Unspecified Whether Gastric Ulcer Hemorrhage Or Perforation Present.        Gross Description      The specimen is labeled \"Alanna  perigastric FNB\" received in formalin. The specimen consists of multiple pink-tan needle core measuring in aggregate 1.9 x 0.6 x 0.1 cm.      Two immediate evaluations of the specimen are performed and reported as \"Additional tissue required\". Three additional cores are received and submitted in formalin. The specimen is inked with eosin and submitted entirely in cassette A1. (jq)      Kelin Bell M.D./trent         Assessment / Plan:    ICD-10-CM    1. 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  Explained that goal will be to perform a limited resection specifically if the mass appears exophytic as suggested by the CT scan.  It is not close rectum is a left gastric band along the lesser curvature so there is the potential for a distal gastrectomy with B2 dissection which the patient was counseled about as well.  We will find time for surgery.    Demetrio Dickinson MD  Complex General Surgical Oncology  [facility]  [email or web address]          Follow Up:  No follow-ups on file.       Electronically Signed by: Demetrio Dickinson MD         [1] No Known Allergies

## 2025-03-19 ENCOUNTER — PATIENT MESSAGE (OUTPATIENT)
Dept: GASTROENTEROLOGY | Facility: CLINIC | Age: 65
End: 2025-03-19

## 2025-03-19 NOTE — TELEPHONE ENCOUNTER
Dr. Donovan, please see patient's message below. The patient also sent message to Osiris Sandy PA-C

## 2025-03-27 ENCOUNTER — TELEPHONE (OUTPATIENT)
Facility: CLINIC | Age: 65
End: 2025-03-27

## 2025-03-27 NOTE — TELEPHONE ENCOUNTER
STEFANIE Garcia I spoke to the patient    Patient is scheduled for office visit on 4/8/2025 at 11:30 am    Location, date and time verified with the patient    Patient verbalized understanding and has no further questions at this time    Your appointments                       Apr 08, 2025 11:30 AM CDT Follow Up Visit with Mary Lou Donovan MD [facility], [facility] ([facility])

## 2025-04-01 ENCOUNTER — OFFICE VISIT (OUTPATIENT)
Facility: CLINIC | Age: 65
End: 2025-04-01
Payer: COMMERCIAL

## 2025-04-01 VITALS
SYSTOLIC BLOOD PRESSURE: 111 MMHG | OXYGEN SATURATION: 98 % | HEART RATE: 63 BPM | DIASTOLIC BLOOD PRESSURE: 74 MMHG | RESPIRATION RATE: 16 BRPM | BODY MASS INDEX: 23.23 KG/M2 | TEMPERATURE: 98 F | HEIGHT: 67 IN | WEIGHT: 148 LBS

## 2025-04-01 DIAGNOSIS — C49.A2 MALIGNANT GASTROINTESTINAL STROMAL TUMOR (GIST) OF STOMACH (HCC): Primary | ICD-10-CM

## 2025-04-01 PROCEDURE — 99214 OFFICE O/P EST MOD 30 MIN: CPT | Performed by: SURGERY

## 2025-04-01 NOTE — PROGRESS NOTES
[facility]    Patient Name:  Mikayla Mondragon   YOB: 1960   Gender:  Female   Appt Date:  4/1/2024   Provider:  Demetrio Dickinson MD   Insurance:  [facility]     PATIENT PROVIDERS  Referring Provider: No ref. provider found   Address: No referring provider defined for this encounter.   Phone #: N/A    Primary Care Provider:Misha Goodson MD   Address: [address] [place] [address]   Phone #: [phone]       CHIEF COMPLAINT  No chief complaint on file.  Follow up     PROBLEMS  Reviewed   Patient Active Problem List   Diagnosis    Hemorrhoids    Dense breasts    Family history of breast cancer in sister    Gastrointestinal hemorrhage, unspecified gastrointestinal hemorrhage type    Anemia, unspecified type    Acute blood loss anemia    Abnormal CT of the abdomen    Gastrointestinal stromal tumor (GIST) (HCC)    Benign gastrointestinal stromal tumor (GIST)        History of Present Illness:  Patient presents today with complaints of abdominal pain, gas, bloating, indigestion.     Oncological history:  64 year old female who was diagnosed with a large gastrointestinal stromal tumor of the stomach after presenting with bleeding.  She underwent a robotic assisted partial gastrectomy, resection on 12/16/2024.  Pathology consistent with a 7.1 cm gastrointestinal stromal tumor, mitotic rate 2 per 5 mm², G1 low-grade, low risk, all margins negative.  pT3 N0. Tolerated procedure well.   Patient has CT abdomen and pelvis scheduled on 5/27/25.     Vital Signs:  LMP 08/12/2013      Medications Reviewed:    Current Outpatient Medications:     pantoprazole 40 MG Oral Tab EC, Take 1 tablet (40 mg total) by mouth 2 (two) times daily before meals., Disp: 180 tablet, Rfl: 3    ferrous sulfate 325 (65 FE) MG Oral Tab EC, Take 1 tablet (325 mg total) by mouth 2 (two) times daily with meals., Disp: , Rfl:      Allergies Reviewed:  Allergies[1]      History:  Reviewed:  Past Medical History:    Amenorrhea    Last period    Anxiety    Job threatened[place]-in [place]/fitness program    Chicken pox    Disorder of thyroid    in high school    Hemorrhoids    Dr. Lisette Coronado     History of hepatitis    History of stomach ulcers    Hypothyroidism    Lipid screening    Measles    Mumps    Viral hepatitis C    Visual impairment    glasses      Reviewed:  Past Surgical History:   Procedure Laterality Date    Colonoscopy  02/01/2008    Dr. Lisette Coronado, Repeat colonoscopy in five years. Family history of coloncancer     Colonoscopy N/A 03/24/2021    Procedure: COLONOSCOPY;  Surgeon: Nehemias Valdez MD;  [facility]    Needle biopsy right      benign     Tonsillectomy  1982      Reviewed Social History:  Social History     Socioeconomic History    Marital status: Single   Tobacco Use    Smoking status: Never    Smokeless tobacco: Never   Vaping Use    Vaping status: Never Used   Substance and Sexual Activity    Alcohol use: Not Currently     Comment: 1 drink a month    Drug use: No    Sexual activity: Never   Other Topics Concern    Caffeine Concern No    Stress Concern No    Weight Concern No    Special Diet No    Exercise Yes    Seat Belt Yes      Reviewed:  Family History   Problem Relation Age of Onset    Heart Disease Father         Coronary Artery Disease     Heart Surgery Father         Coronary artery bypass grafting     Heart Disorder Father         Triple bypass 1983, no complications since    Lipids Father         Hyperlipidemia     Dementia Father     Hypertension Mother     Psychiatric Mother         Severe anxiety 1920's, Shock therapy 1962    Other (Pschyologicals problems) Mother     Depression Mother     Cancer Paternal Grandmother         Colon cancer 1993    Cancer Paternal Grandfather         Brain cancer 1959    Cancer Sister     Breast Cancer Sister 57    Stroke Other         Grandmother     Colon Cancer Other          Family history of         Review of Systems:  Review of Systems   Constitutional:  Negative for activity change, appetite change, chills, fatigue, fever and unexpected weight change.   HENT:  Negative for congestion and trouble swallowing.    Eyes:  Negative for discharge and redness.   Respiratory:  Negative for cough, chest tightness and shortness of breath.    Cardiovascular:  Negative for chest pain and leg swelling.   Gastrointestinal:  Negative for abdominal distention, abdominal pain and nausea.   Endocrine: Negative for polydipsia and polyuria.   Genitourinary:  Negative for difficulty urinating and dysuria.   Musculoskeletal:  Negative for myalgias.   Skin:  Negative for color change and pallor.   Allergic/Immunologic: Negative for immunocompromised state.   Neurological:  Negative for syncope and weakness.   Hematological:  Does not bruise/bleed easily.   Psychiatric/Behavioral:  Negative for agitation and confusion.         Physical Examination:  Physical Exam  Constitutional:       Appearance: She is well-developed.   HENT:      Head: Normocephalic.   Eyes:      Pupils: Pupils are equal, round, and reactive to light.   Cardiovascular:      Rate and Rhythm: Normal rate and regular rhythm.      Heart sounds: No murmur heard.  Pulmonary:      Effort: Pulmonary effort is normal. No respiratory distress.      Breath sounds: Normal breath sounds. No wheezing or rales.   Abdominal:      General: There is no distension.      Palpations: Abdomen is soft.      Tenderness: There is no abdominal tenderness.   Musculoskeletal:      Cervical back: Normal range of motion.   Skin:     General: Skin is warm and dry.   Neurological:      Mental Status: She is alert and oriented to person, place, and time.        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  Tumor extends to 0.5 mm from closest specimen margin.  All surgical margins negative for  tumor.  Five lymph nodes with no tumor identified (0/5).  pT3,N0.     Comment:  Sections demonstrate a well-circumscribed spindle cell tumor.  The spindle cells are arranged in a predominantly fascicular pattern and demonstrate minimal cytologic atypia.  The mitotic rate is low (2 mitoses per 5 mm²).  No necrosis is identified.  The tumor cells are positive for  and CD34; the Ki-67 proliferation index is low (< 2%).  These findings support the above diagnosis     Paraffin-embedded tumor (block A1) has been submitted for c-KIT and Platelet-Derived Growth Factor Receptor (PDGFR) mutation analysis.  Results will be conveyed as a linked report.        The immunohistochemical stains interpreted in this case demonstrate appropriate reactivity of positive controls.  The stains were performed on formalin-fixed, paraffin-embedded tissue sections with each antibody analysis being performed on a separate slide.        Electronically signed by Silvano uGerra MD on 12/19/2024 at  3:10 PM        Synoptic Report     GASTROINTESTINAL STROMAL TUMOR (GIST): Resection   8th Edition - Protocol posted: 12/14/2022GASTROINTESTINAL STROMAL TUMOR (GIST): RESECTION - All Specimens  SPECIMEN   Procedure  Partial gastrectomy   TUMOR   Tumor Focality  Unifocal   Tumor Site  Stomach: Lesser curvature   Histologic Type  Gastrointestinal stromal tumor, spindle cell type   Tumor Size  Greatest Dimension (Centimeters): 7.1 cm   Mitotic Rate  2 mitoses per 5 mm2   Histologic Grade  G1, low grade   Necrosis  Not identified   Treatment Effect  No known presurgical therapy   Risk Assessment  Low risk   MARGINS   Margin Status  All margins negative for GIST   Closest Margin(s) to GIST  Omental (radial)   Distance from GIST to Closest Margin  0.5 mm   REGIONAL LYMPH NODES   Regional Lymph Node Status  All regional lymph nodes negative for tumor   Number of Lymph Nodes Examined  5   pTNM CLASSIFICATION (AJCC 8th Edition)   Reporting of pT, pN, and  (when applicable) pM categories is based on information available to the pathologist at the time the report is issued. As per the AJCC (Chapter 1, 8th Ed.) it is the managing physician's responsibility to establish the final pathologic stage based upon all pertinent information, including but potentially not limited to this pathology report.   pT Category  pT3   pN Category  pN0   .        Clinical Information      C49.A2 Malignant Gastrointestinal Stromal Tumor (GIST) of Stomach (Hcc).        Gross Description      The specimen is labeled \"Mondragon, partial gastrectomy, gastric tumor\" received in formalin. The specimen consists of a 111 gram unoriented bulging subserosal mass measuring 7.1 x 5.9 x 5.1 cm. The peripheral/mucosal margin is inked orange, the serosal surface is inked black. The specimen is serially sectioned to reveal a pink-tan whorled cut surface. The mass is <0.1 cm from the nearest peripheral/mucosal margin. No mucosal or serosal invasion is grossly identified. Representative sections are submitted as follows: A1-A5, mass with closest peripheral/mucosal margins including mucosal side and serosal side; A6-A8, additional sections of mass (serosal side); A9-A10, additional sections of mass (mucosal side). (loreeq)      Silvano Guerra M.D./[facility]       Study Result    Narrative   PROCEDURE: CT ABDOMEN + PELVIS (CONTRAST ONLY) (CPT=74177)     COMPARISON: [facility], CT CALCIUM SCORING OVER READ, 7/02/2024, 1:11 PM.     INDICATIONS: History of non-healing gastric ulcer revealed on an EGD of 10/21/2024.     TECHNIQUE: CT images of the abdomen and pelvis were obtained with non-ionic intravenous contrast material.  Automated exposure control for dose reduction was used. Adjustment of the mA and/or kV was done based on the patient's size. Use of iterative  reconstruction technique for dose reduction was used.  Dose information is transmitted to the ACR (American College of Radiology)  NRDR (National Radiology Data Registry) which includes the Dose Index Registry.     FINDINGS:     The following findings were made:     1. There is a 6.7 x 5.6 by 5.6 cm well-defined lobulated heterogeneous though mainly low attenuated mass like density along the lesser curve of the stomach.  There there are a few clips at the periphery of this mass.  This could represent a primary  gastric malignancy with central ulceration or a gyn gastric ulcer with contained hematoma.  There is no CT evidence of gastric perforation.     2. Immediately superior and anterior to the aforementioned mass is a 2.4 by 1.4 cm low attenuated lesion with a focal peripheral calcification at the lateral aspect of the left hepatic lobe.  This raises possibility of localized metastatic spread .  No  other hepatic lesions are identified.     3. There are a few small calcified fibroids within an otherwise normal appearing uterus.     The remainder of the examination is unremarkable.  Specifically, the lung bases are clear.  The visualized aspects of the spleen, pancreas, gallbladder, adrenals, kidneys, small bowel, colon, and remaining soft tissues demonstrated grossly normal CT  appearance for patient of this age.  There is no free fluid or organized fluid collection.  There is no significant adenopathy.               Impression   CONCLUSION: There is a 6.7 x 5.6 by 5.6 cm well-defined lobulated heterogeneous though mainly low attenuated mass like density along the lesser curve of the stomach.  There there are a few clips at the periphery of this mass.  This could represent a  primary gastric malignancy with central ulceration or a gyn gastric ulcer with contained hematoma.  There is no CT evidence of gastric perforation. Immediately superior and anterior to the aforementioned mass is a 2.4 by 1.4 cm low attenuated lesion with   a focal peripheral calcification at the lateral aspect of the left hepatic lobe.  This raises possibility of  localized metastatic spread .  No other hepatic lesions are identified. Further evaluation with biopsy is recommended.          Dictated by (CST): Augustine Mesa MD on 11/01/2024 at 9:56 AM      Finalized by (CST): Augustine Mesa MD on 11/01/2024 at 10:14 AM           Assessment / Plan:  Proceed with upper GI    Demetrio Dickinson MD  [facility] General Surgical Oncology  [facility]  [email or web address]        Follow Up:  No follow-ups on file.       Electronically Signed by: Demetrio Dickinson MD         [1] No Known Allergies

## 2025-04-03 ENCOUNTER — TELEPHONE (OUTPATIENT)
Dept: SURGERY | Facility: CLINIC | Age: 65
End: 2025-04-03

## 2025-04-03 ENCOUNTER — HOSPITAL ENCOUNTER (OUTPATIENT)
Dept: GENERAL RADIOLOGY | Facility: HOSPITAL | Age: 65
Discharge: HOME | End: 2025-04-03
Payer: MEDICARE

## 2025-04-03 DIAGNOSIS — K31.84 GASTROPARESIS: ICD-10-CM

## 2025-04-03 DIAGNOSIS — C49.A2 MALIGNANT GASTROINTESTINAL STROMAL TUMOR (GIST) OF STOMACH (HCC): Primary | ICD-10-CM

## 2025-04-03 DIAGNOSIS — C49.A2 MALIGNANT GASTROINTESTINAL STROMAL TUMOR (GIST) OF STOMACH (HCC): ICD-10-CM

## 2025-04-03 PROCEDURE — 74246 X-RAY XM UPR GI TRC 2CNTRST: CPT

## 2025-04-03 RX ORDER — METOCLOPRAMIDE 10 MG/1
10 TABLET ORAL
Qty: 43 TABLET | Refills: 0 | Status: SHIPPED | OUTPATIENT
Start: 2025-04-03

## 2025-04-03 NOTE — TELEPHONE ENCOUNTER
Called patient to discuss results of upper GI study that was completed. No answer. Left voicemail with office contact information for her to return phone call when convenient.

## 2025-04-03 NOTE — TELEPHONE ENCOUNTER
Called patient to inform her of her upper GI results showing gastro paresis with reflux. Recommend starting reglan 3 x daily 15 - 20 minutes before meals. Will follow up with her next week to see how she is doing. Patient verbalized understanding. Will call with concerns or questions. Answered all patient questions.   

## 2025-04-08 ENCOUNTER — OFFICE VISIT (OUTPATIENT)
Dept: GASTROENTEROLOGY | Facility: CLINIC | Age: 65
End: 2025-04-08
Payer: MEDICARE

## 2025-04-08 ENCOUNTER — TELEPHONE (OUTPATIENT)
Dept: GASTROENTEROLOGY | Facility: CLINIC | Age: 65
End: 2025-04-08

## 2025-04-08 VITALS
HEART RATE: 69 BPM | DIASTOLIC BLOOD PRESSURE: 68 MMHG | SYSTOLIC BLOOD PRESSURE: 111 MMHG | HEIGHT: 67 IN | BODY MASS INDEX: 23.07 KG/M2 | WEIGHT: 147 LBS

## 2025-04-08 DIAGNOSIS — R10.13 DYSPEPSIA: Primary | ICD-10-CM

## 2025-04-08 DIAGNOSIS — K31.84 GASTROPARESIS: Primary | ICD-10-CM

## 2025-04-08 DIAGNOSIS — D21.4 BENIGN GASTROINTESTINAL STROMAL TUMOR (GIST): ICD-10-CM

## 2025-04-08 PROCEDURE — 99214 OFFICE O/P EST MOD 30 MIN: CPT | Performed by: INTERNAL MEDICINE

## 2025-04-08 RX ORDER — PANTOPRAZOLE SODIUM 40 MG/1
40 TABLET, DELAYED RELEASE ORAL
Qty: 180 TABLET | Refills: 3 | Status: SHIPPED | OUTPATIENT
Start: 2025-04-08

## 2025-04-08 RX ORDER — METOCLOPRAMIDE 10 MG/1
10 TABLET ORAL
Qty: 43 TABLET | Refills: 0 | Status: SHIPPED | OUTPATIENT
Start: 2025-04-08

## 2025-04-08 NOTE — TELEPHONE ENCOUNTER
Per Jumana samuels ok to add EGD on 5/06/2025 @ [facility] (ok to add at end of day).    I called and left message for patient to call back to schedule EGD procedure

## 2025-04-08 NOTE — PROGRESS NOTES
[facility] - Gastroenterology                                                                                                      Clinic Follow-up Visit    Chief Complaint   Patient presents with    Follow - Up         Subjective/HPI:     64 F with h/o hepatitis C (in the 1970s s/p treatment and last hepatitis panel with SVR), gastric GIST tumor s/p partial gastrectomy 12/2024 here for the following:    After her gastrectomy, she recovered well. About 2 months ago, pt developed post prandial gas and bloating that occurs several hours after eating.  An upper GI study was done and shows e/o gastroparesis. She was started on reglan TID and is feeling better though she still notes gas in the evenings.  Pt also stopped her PPI in February. Denies any other symptoms. She officially retired and feels really good about it.       UGI 4/3/25  Impression   CONCLUSION:  1. Normal caliber esophagus.  Mild gastroesophageal reflux.  2. Large amount of retained food material within the gastric lumen despite NPO >12 hours.  Retained food material within the lumen limits evaluation for subtle mucosal abnormalities.  Suspect mild component of gastro paresis without outlet obstruction.    Normal duodenum.  3. Recommend upper endoscopy for further evaluation.         EGD - see A/P below.   Last CLN in 2021 - recall in 10 years recommended.     Wt Readings from Last 6 Encounters:   04/08/25 147 lb (66.7 kg)   04/01/25 148 lb (67.1 kg)   01/09/25 155 lb (70.3 kg)   12/16/24 160 lb (72.6 kg)   12/09/24 160 lb (72.6 kg)   11/19/24 162 lb 3.2 oz (73.6 kg)        History, Medications, Allergies, ROS:      Past Medical History:    Amenorrhea    Last period    Anxiety    Job threatened[place]-in [place]/fitness program    Chicken pox    Disorder of thyroid    in high school    Hemorrhoids    Dr. Lisette Coronado     History of hepatitis     History of stomach ulcers    Hypothyroidism    Lipid screening    Measles    Mumps    Viral hepatitis C    Visual impairment    glasses      Past Surgical History:   Procedure Laterality Date    Colonoscopy  02/01/2008    Dr. Lisette Coronado, Repeat colonoscopy in five years. Family history of coloncancer     Colonoscopy N/A 03/24/2021    Procedure: COLONOSCOPY;  Surgeon: Nehemias Valdez MD;  Location: [facility] ENDO    Needle biopsy right      benign     Tonsillectomy  1982      Family History   Problem Relation Age of Onset    Heart Disease Father         Coronary Artery Disease     Heart Surgery Father         Coronary artery bypass grafting     Heart Disorder Father         Triple bypass 1983, no complications since    Lipids Father         Hyperlipidemia     Dementia Father     Hypertension Mother     Psychiatric Mother         Severe anxiety 1920's, Shock therapy 1962    Other (Pschyologicals problems) Mother     Depression Mother     Cancer Paternal Grandmother         Colon cancer 1993    Cancer Paternal Grandfather         Brain cancer 1959    Cancer Sister     Breast Cancer Sister 57    Stroke Other         Grandmother     Colon Cancer Other         Family history of       Social History:   Social History     Socioeconomic History    Marital status: Single   Tobacco Use    Smoking status: Never    Smokeless tobacco: Never   Vaping Use    Vaping status: Never Used   Substance and Sexual Activity    Alcohol use: Not Currently     Comment: 1 drink a month    Drug use: No    Sexual activity: Never   Other Topics Concern    Caffeine Concern No    Stress Concern No    Weight Concern No    Special Diet No    Exercise Yes    Seat Belt Yes     Social Drivers of Health     Food Insecurity: No Food Insecurity (12/16/2024)    Food Insecurity     Food Insecurity: Never true   Transportation Needs: No Transportation Needs (12/16/2024)    Transportation Needs     Lack of Transportation: No   Housing Stability: Low Risk   (12/16/2024)    Housing Stability     Housing Instability: No        Medications (Active prior to today's visit):  Current Outpatient Medications   Medication Sig Dispense Refill    metoclopramide 10 MG Oral Tab Take 1 tablet (10 mg total) by mouth 3 (three) times daily before meals. Please take 10 to 15 minutes before meals 43 tablet 0    pantoprazole 40 MG Oral Tab EC Take 1 tablet (40 mg total) by mouth 2 (two) times daily before meals. 180 tablet 3       Allergies:  No Known Allergies    ROS:   CONSTITUTIONAL:  negative for fevers, chills  EYES:  negative for change in vision  RESPIRATORY:  negative for  shortness of breath  CARDIOVASCULAR:  negative for  chest pain  GASTROINTESTINAL:  see HPI  GENITOURINARY:  negative for dysuria  INTEGUMENT/BREAST:  SKIN:  negative for  rash  ALLERGIC/IMMUNOLOGIC:  negative for hay fever  ENDOCRINE:  negative for cold intolerance and heat intolerance  MUSCULOSKELETAL:  negative for  joint stiffness and joint swelling  BEHAVIOR/PSYCH:  negative for depressed mood    PHYSICAL EXAM:   Blood pressure 111/68, pulse 69, height 5' 7\" (1.702 m), weight 147 lb (66.7 kg), last menstrual period 08/12/2013, not currently breastfeeding.    GEN - Patient appears comfortable and in no acute discomfort  ENT - MMM  EYES - the sclera appears anicteric  CV - no edema  RESP -  No increased work of breathing  ABDOMEN - soft, non-tender exam in all quadrants without rigidity or guarding, non-distended, no abnormal bowel sounds noted, no masses are palpated  SKIN - No jaundice  NEURO - Alert and appropriate, and gross movements of extremities normal  PSYCH - normal affect, non-agitated      Labs/Imaging:     Patient's labs and imaging were reviewed and discussed with patient today.     .  ASSESSMENT/PLAN:     64 F with h/o hepatitis C (in the 1970s s/p treatment and last hepatitis panel with SVR), gastric GIST tumor s/p partial gastrectomy 12/2024 here for the following:    Post prandial  gas/dyspepsia  Gastroparesis - likely based on UGI findings from 4/2025. Could be post surgical vs post viral.   After her gastrectomy 12/2024, she recovered well. About 2 months ago, pt developed post prandial gas and bloating that occurs several hours after eating.  An upper GI study was done and shows e/o gastroparesis. She was started on reglan TID and is feeling better though she still notes gas in the evenings.  Pt also stopped her PPI in February. We discussed gastroparesis in detail along with treatment. Recommend diet modification to accommodate the gastroparesis - small/frequent meals, avoiding fiber and fat, etc.. Hopefully, it will improve over the next several weeks/months.     Gastric ulcer 2/2 gastric GIST tumor - Pt had melena and a ~6 g drop in hgb in July 2024 that led to an admission. S/p EGD on the admission that revealed a clean based gastric ulcer. EGD to ensure healing of the gastric ulcer was done in 10/2024 and a submucosal lesion was suspected after which pt had an upper EUS that confirmed a GIST tumor. She is s/p partial gastrectomy 12/2024. See above.      CRC screening - last CLN was in 2021 and recall recommended in 10 years.      Recommend    - Please review the gastroparesis diet information    - Modify your diet to accommodate the gastroparesis    - EGD for luminal eval given UGI findings    - GES in ~2 months to re-eval stomach emptying - pt will hopefully be off reglan by then    - Restart pantoprazole 40 mg BID for now    - Recommend weaning reglan over the next couple of weeks once she adjusts her diet given the side effect profile    - Surveillance CT scan is scheduled for May. I also recommend ensuring that her case was discussed at tumor board post-op since I don't see any documentation. Pt has follow up with Dr. Bell this week and will discuss this with him.         EGD consent: I have discussed the risks, benefits, and alternatives to upper endoscopy/enteroscopy with  the patient/primary decision maker [who demonstrated understanding], including but not limited to the risks of bleeding, infection, pain, death, as well as the risks of anesthesia and perforation all leading to prolonged hospitalization, surgical intervention, or even death. I also specifically mentioned the miss rate of upper endoscopy of 5-10% in the best of all circumstances.  The patient has agreed to sign an informed consent and elected to proceed with procedure with possible intervention [i.e. polypectomy, stent placement, etc.] as indicated.        Orders This Visit:  No orders of the defined types were placed in this encounter.      Meds This Visit:  Requested Prescriptions     Signed Prescriptions Disp Refills    metoclopramide 10 MG Oral Tab 43 tablet 0     Sig: Take 1 tablet (10 mg total) by mouth 3 (three) times daily before meals. Please take 10 to 15 minutes before meals    pantoprazole 40 MG Oral Tab  tablet 3     Sig: Take 1 tablet (40 mg total) by mouth 2 (two) times daily before meals.       Imaging & Referrals:  [facility] GASTRIC EMPTYING STUDY  (CPT=78264)     4/8/2025     Mary Lou Donovan MD        This note may have been partially prepared using Dragon Medical voice recognition dictation software. As a result, errors may occur. When identified, these errors have been corrected. While every attempt is made to correct errors during dictation, discrepancies may still exist.

## 2025-04-08 NOTE — PATIENT INSTRUCTIONS
PLAN    - Please read the information provided re: the gastroparesis diet    - Once you are tolerating a gastroparesis diet, please try weaning off the reglan. Go down to twice a day for a few days, then daily for a few days, then off.    - Schedule the gastric emptying test in ~2 months    - Continue pantoprazole 40 mg twice a day    - Follow up with Dr. Donovan in ~3 months    Instructions for the EGD  1. Schedule upper endoscopy (EGD) with [facility] [Diagnosis: dyspepsia]    2. If you start any NEW medication after your visit today, please notify us. Certain medications will need to be held before the procedure, or the procedure cannot be performed.

## 2025-04-08 NOTE — TELEPHONE ENCOUNTER
Scheduled for: Esophagogastroduodenoscopy [id number]    Provider Name:  Dr Donovan    Date:  5/29/2025    Location:    [facility]    Sedation:  MAC    Time:  8:00 am (Patient made aware [facility] will call the day before with procedure/arrival time)    Prep:  NPO after midnight    Meds/Allergies Reconciled?:  Physician reviewed     Diagnosis with codes:    Dyspepsia [R10.13]     Was patient informed to call insurance with codes (Y/N):  Yes, I confirmed Medicare insurance with the patient.     Referral sent?:  N/A    EM or [facility] notified?:  I sent an electronic request to Endo Scheduling and received a confirmation today.      Medication Orders:  This patient verbally confirmed that she is not taking:    Iron, blood thinners, BP meds, and is not diabetic    No latex allergy, No PCN allergy and does not have a pacemaker     Misc Orders:       Further instructions given by staff:   I discussed the prep instructions with the patient which she verbally understood and is aware that I will send the instructions today via [employer].    Advised patient:    You will not be able to drive, operate machinery or make critical decisions the day of your procedure. Please make arrangements for transportation. You must have a  (age 18 or older) to accompany you, stay in the facility for the duration of your procedure and drive you home after the procedure.  You cannot use public transportation ([employer], Lyft, Taxi). The procedure involves sedation, and you will not be allowed to leave unaccompanied. Your procedure will not proceed forward if you're unable to confirm your  planned to escort you home.    Advised Patient:    [facility] requires payment of copay and any patient responsibility at the time of registration.   The [facility] requires copay and 50% of the patient responsibility at the time of service for all Esophagogastroduodenoscopy and diagnostic Colonoscopies.     They do offer payment plans and Care Credit options if unable to pay the full  amount at the time of registration.     If you have any questions regarding your potential responsibility, please contact [facility] Insurance Department at [phone] option 1.    You may receive 4 bills related to your medical procedure:   [facility] (the facility)  The procedural physician  The anesthesiologist  The pathology lab (if applicable)

## 2025-04-08 NOTE — TELEPHONE ENCOUNTER
Patient was seen by Dr. Donovan in office, she was offered 5/07/2025 but is not available due to traveling.   I messaged [facility] to see if Dr. Donovan can add an EGD on 5/06/2025 @ [facility] (ok to add at end of day)  I also messaged eosc for 5/05/2025.        Schedulers, see providers orders below:     -Patient was seen in office today and was provided with written and verbal procedure prep instructions, including any medication adjustments.   -Patient was advised to call medical insurance for any questions on benefits and/or any out of pocket costs.   -Patient is aware that the GI schedulers will be calling to schedule procedure(s).        Instructions for the EGD  1. Schedule upper endoscopy (EGD) with MAC [Diagnosis: dyspepsia]     2. If you start any NEW medication after your visit today, please notify us. Certain medications will need to be held before the procedure, or the procedure cannot be performed.

## 2025-05-15 NOTE — TELEPHONE ENCOUNTER
May 22, 2025       Unknown PCP [facility]  No Known Address On File  Via Mail      Patient: David Styles   YOB: 1980   Date of Visit: 5/15/2025       Dear  Pcp [facility]:    I saw your patient, David Styles, for an evaluation. Below are my notes for this visit with him.    If you have questions, please do not hesitate to call me.      Sincerely,        Devin Desir MD        CC: No Recipients  Devin Desir MD  5/22/2025  3:45 PM  Signed    [facility] [address] [address]  [phone]    Note to the patient: The 21st Century Cures Act makes medical notes like these available to patients in the interest of transparency. Please be aware that this is a medical document, written in medical language, and intended for doctor-to-doctor communication. Medical documents are intended to carry relevant information and the clinical opinion of the author in a brief format. For this reason, medical notes may seem blunt or direct, and may contain abbreviations or verbiage that are unfamiliar.    Referring Physician: Clark Flynn DO    Chief Complaint: Colon Cancer screening    [New Consult]  [Language/: English]    HPI:  Mr. David Styles is a pleasant 45 year old male with PMH of  has a past medical history of Essential (primary) hypertension.   who presents to clinic for consultation regarding Diverticulitis.    Diverticulitis recurrent.  First episode 2018 and again 2029.  No recurrence.  No colonoscopy     Has had some constipation intermittently.  1-2 months     Bowel habits:  - Frequency: 3   - Consistency: formed   - Straining: no  - Sensation of incomplete evacuation: no  - Blood: no    Last EGD: no  Last Colonoscopy: no      ROS: 10 systems were reviewed and were negative except as per HPI. ROS      Past Medical/Surgical History:   Past Medical History:   Diagnosis Date   •  Yes, okay to change order, thank you Essential (primary) hypertension        Past Surgical History:   Procedure Laterality Date   • No past surgeries           Social History:  Social History     Tobacco Use   • Smoking status: Never   • Smokeless tobacco: Never   Substance Use Topics   • Alcohol use: Yes     Alcohol/week: 1.0 standard drink of alcohol     Types: 1 Standard drinks or equivalent per week   • Drug use: Not Currently         Family History:   Family History   Problem Relation Age of Onset   • Hypertension Mother    • Hypertension Father    • Diabetes Father    • Congestive Heart Failure Father    • Cancer, Prostate Father      Family history of colon cancer: no  Family history of gastric cancer: no  Family history of esophageal cancer: no    Allergies: ALLERGIES:  No Known Allergies    Home Meds:   Current Outpatient Medications   Medication Sig Dispense Refill   • amlodipine-benazepril (LOTREL) 5-20 MG per capsule Take 1 capsule by mouth daily. 90 capsule 0     No current facility-administered medications for this visit.       Objective  VITALS:  Vitals:    05/15/25 1323   BP: (!) 165/101   Pulse:    Resp:    Temp:        Exam:  Physical Exam  Constitutional:       General: He is not in acute distress.     Appearance: Normal appearance. He is not ill-appearing or toxic-appearing.   HENT:      Head: Normocephalic.      Mouth/Throat:      Mouth: Mucous membranes are moist.   Eyes:      General: No scleral icterus.     Conjunctiva/sclera: Conjunctivae normal.   Cardiovascular:      Rate and Rhythm: Normal rate and regular rhythm.      Pulses: Normal pulses.   Pulmonary:      Effort: Pulmonary effort is normal. No respiratory distress.      Breath sounds: Normal breath sounds. No wheezing.   Abdominal:      General: Abdomen is flat. There is no distension.      Palpations: There is no mass.      Tenderness: There is no abdominal tenderness. There is no guarding or rebound.      Hernia: No hernia is present.   Musculoskeletal:      Right  lower leg: No edema.      Left lower leg: No edema.   Skin:     General: Skin is warm and dry.      Coloration: Skin is not jaundiced or pale.      Findings: No erythema or rash.   Neurological:      Mental Status: He is alert and oriented to person, place, and time.      Motor: No weakness.      Comments: No tremors.    Psychiatric:         Thought Content: Thought content normal.           Labs:  Lab Results   Component Value Date/Time    WBC 6.4 01/17/2025 10:02 AM    WBC 6.5 06/18/2020 09:20 AM    WBC 13.2 (H) 06/20/2019 03:20 PM    HGB 15.9 01/17/2025 10:02 AM    HGB 15.4 06/18/2020 09:20 AM    HGB 15.7 06/20/2019 03:20 PM    HCT 46.4 01/17/2025 10:02 AM    HCT 44.7 06/18/2020 09:20 AM    HCT 45.4 06/20/2019 03:20 PM     01/17/2025 10:02 AM     06/18/2020 09:20 AM     06/20/2019 03:20 PM     Lab Results   Component Value Date/Time    CO2 28 02/08/2025 10:08 AM    CO2 28 01/17/2025 10:02 AM    CO2 27 12/24/2021 10:26 AM    CO2 28 06/18/2020 09:20 AM    CO2 29 06/20/2019 03:20 PM    BUN 13 02/08/2025 10:08 AM    BUN 20 01/17/2025 10:02 AM    BUN 14 12/24/2021 10:26 AM    BUN 16 06/18/2020 09:20 AM    BUN 13 06/20/2019 03:20 PM    CREATININE 0.80 02/08/2025 10:08 AM    CREATININE 0.86 01/17/2025 10:02 AM    CREATININE 0.73 12/24/2021 10:26 AM    CREATININE 0.80 06/18/2020 09:20 AM    CREATININE 0.85 06/20/2019 03:20 PM    GLUCOSE 100 (H) 02/08/2025 10:08 AM    GLUCOSE 102 (H) 06/18/2020 09:20 AM    CALCIUM 8.9 02/08/2025 10:08 AM    CALCIUM 9.0 01/17/2025 10:02 AM    CALCIUM 8.8 12/24/2021 10:26 AM    CALCIUM 8.6 06/18/2020 09:20 AM    CALCIUM 9.2 06/20/2019 03:20 PM     Lab Results   Component Value Date/Time    AST 23 01/17/2025 10:02 AM    AST 25 12/24/2021 10:26 AM    AST 21 06/18/2020 09:20 AM    AST 19 06/20/2019 03:20 PM       Labs were reviewed.     IMAGING/PROCEDURES:    EGD:      Colonoscopy:      Other:      Imaging and procedures were reviewed.      Assessment & Plan    Mr. Hernandez  Jensen is a pleasant 45 year old male with PMH of  has a past medical history of Essential (primary) hypertension. who presents to clinic for consultation regarding Diverticultitis .    #Recurrent Diverticulitis   #Colon cancer screening  2 episodes.  No colonoscopy previously.  Due for colon cancer screening.  --Colonoscopy ordered and scheduled.  The benefits and risks of endoscopy were discussed with the patient and they wish to proceed  --IF recurrent diverticulitis will refer to surgery .     It was a pleasure seeing the pateitnt in clinic today. I recommend they return to clinic after endoscopy depending on findings and symptom course.    The plan of care was discussed with the patient who expressed understanding and agreement. The patient was provided the opportunity to ask questions, and all questions were answered to the best of my ability.    No LOS data to display   This time includes a combination of pre-charting, chart review, documenting, entering orders, coordination of care, and direct patient care/counseling.    Devin Desir MD  Gastroenterology/Hepatology

## 2025-05-19 ENCOUNTER — PATIENT MESSAGE (OUTPATIENT)
Dept: GASTROENTEROLOGY | Facility: CLINIC | Age: 65
End: 2025-05-19

## 2025-05-21 ENCOUNTER — PATIENT MESSAGE (OUTPATIENT)
Facility: CLINIC | Age: 65
End: 2025-05-21

## 2025-05-23 NOTE — TELEPHONE ENCOUNTER
CHELE Kay reviewed message received and recommends patient to continue with 5/27 CT scan and 5/29 endoscopy as scheduled. Results to be reviewed with patient at appointment scheduled 6/3/25.     Spoke to patient and notified APRN's recommendations. All questions answered, advised to call back for any future questions or concerns. Patient verbalized understanding.

## 2025-05-27 ENCOUNTER — HOSPITAL ENCOUNTER (OUTPATIENT)
Dept: CT IMAGING | Facility: HOSPITAL | Age: 65
Discharge: HOME | End: 2025-05-27
Attending: SURGERY
Payer: MEDICARE

## 2025-05-27 DIAGNOSIS — C49.A2 MALIGNANT GASTROINTESTINAL STROMAL TUMOR (GIST) OF STOMACH (HCC): ICD-10-CM

## 2025-05-27 LAB
CREAT BLD-MCNC: 1.1 MG/DL (ref 0.55–1.02)
EGFRCR SERPLBLD CKD-EPI 2021: 55 ML/MIN/1.73M2 (ref 60–?)

## 2025-05-27 PROCEDURE — 74177 CT ABD & PELVIS W/CONTRAST: CPT | Performed by: SURGERY

## 2025-05-27 PROCEDURE — 82565 ASSAY OF CREATININE: CPT

## 2025-05-29 PROBLEM — K20.90 ESOPHAGITIS DETERMINED BY ENDOSCOPY: Status: ACTIVE | Noted: 2025-05-29

## 2025-05-29 PROCEDURE — 88305 TISSUE EXAM BY PATHOLOGIST: CPT | Performed by: INTERNAL MEDICINE

## 2025-05-29 PROCEDURE — 88312 SPECIAL STAINS GROUP 1: CPT | Performed by: INTERNAL MEDICINE

## 2025-06-01 ENCOUNTER — PATIENT MESSAGE (OUTPATIENT)
Dept: GASTROENTEROLOGY | Facility: CLINIC | Age: 65
End: 2025-06-01

## 2025-06-02 ENCOUNTER — EKG ENCOUNTER (OUTPATIENT)
Dept: LAB | Facility: HOSPITAL | Age: 65
End: 2025-06-02
Attending: INTERNAL MEDICINE
Payer: MEDICARE

## 2025-06-02 DIAGNOSIS — K31.84 GASTROPARESIS: ICD-10-CM

## 2025-06-02 LAB
ATRIAL RATE: 57 BPM
P AXIS: 60 DEGREES
P-R INTERVAL: 130 MS
Q-T INTERVAL: 414 MS
QRS DURATION: 78 MS
QTC CALCULATION (BEZET): 402 MS
R AXIS: 3 DEGREES
T AXIS: 34 DEGREES
VENTRICULAR RATE: 57 BPM

## 2025-06-02 PROCEDURE — 93010 ELECTROCARDIOGRAM REPORT: CPT | Performed by: INTERNAL MEDICINE

## 2025-06-02 PROCEDURE — 93005 ELECTROCARDIOGRAM TRACING: CPT

## 2025-06-10 ENCOUNTER — OFFICE VISIT (OUTPATIENT)
Facility: CLINIC | Age: 65
End: 2025-06-10
Payer: MEDICARE

## 2025-06-10 ENCOUNTER — PATIENT MESSAGE (OUTPATIENT)
Dept: GASTROENTEROLOGY | Facility: CLINIC | Age: 65
End: 2025-06-10

## 2025-06-10 VITALS
WEIGHT: 143 LBS | RESPIRATION RATE: 16 BRPM | DIASTOLIC BLOOD PRESSURE: 77 MMHG | SYSTOLIC BLOOD PRESSURE: 135 MMHG | OXYGEN SATURATION: 97 % | BODY MASS INDEX: 22 KG/M2 | HEART RATE: 86 BPM

## 2025-06-10 DIAGNOSIS — C49.A2 MALIGNANT GASTROINTESTINAL STROMAL TUMOR (GIST) OF STOMACH (HCC): Primary | ICD-10-CM

## 2025-06-10 PROCEDURE — 99215 OFFICE O/P EST HI 40 MIN: CPT | Performed by: SURGERY

## 2025-06-10 NOTE — PROGRESS NOTES
[facility]    Patient Name:  Mikayla Mondragon   YOB: 1960   Gender:  Female   Appt Date:  6/10/2025   Provider:  Demetrio Dickinson MD   Insurance:  [facility]     PATIENT PROVIDERS  Referring Provider: No ref. provider found   Address: No referring provider defined for this encounter.   Phone #: N/A    Primary Care Provider:Misha Goodson MD   Address: [address] [place] [address]   Phone #: [phone]       CHIEF COMPLAINT  No chief complaint on file.  Follow up     PROBLEMS  Reviewed   Patient Active Problem List   Diagnosis    Hemorrhoids    Dense breasts    Family history of breast cancer in sister    Gastrointestinal hemorrhage, unspecified gastrointestinal hemorrhage type    Anemia, unspecified type    Acute blood loss anemia    Abnormal CT of the abdomen    Gastrointestinal stromal tumor (GIST) (HCC)    Benign gastrointestinal stromal tumor (GIST)    Esophagitis determined by endoscopy        History of Present Illness:  Patient presents today with complaints of abdominal pain, gas, bloating, indigestion.   Upper GI completed on 4/3/2025 showing mild gastroesophageal reflux with mild gastro paresis without outlet obstruction. Recommended patient follow up with Gastroenterology. Gastric emptying study ordered by Dr. Donovan, not scheduled. Patient currently taking pantoprazole 40 mg EC daily and metoclopramide 10 mg three times daily. CT abdomen pelvis on 5/27/2025 unremarkable, no evidence of intra-abdominal/intrapelvic residual or metastatic disease.     Oncological history:  64 year old female who was diagnosed with a large gastrointestinal stromal tumor of the stomach after presenting with bleeding.  She underwent a robotic assisted partial gastrectomy, resection on 12/16/2024.  Pathology consistent with a 7.1 cm gastrointestinal stromal tumor, mitotic rate 2 per 5 mm², G1 low-grade, low risk, all margins negative.  pT3 N0. Tolerated  procedure well.   Patient has CT abdomen and pelvis scheduled on 5/27/25.     Vital Signs:  LMP 08/12/2013      Medications Reviewed:    Current Outpatient Medications:     metoclopramide 10 MG Oral Tab, Take 1 tablet (10 mg total) by mouth 3 (three) times daily before meals. Please take 10 to 15 minutes before meals, Disp: 43 tablet, Rfl: 0    pantoprazole 40 MG Oral Tab EC, Take 1 tablet (40 mg total) by mouth 2 (two) times daily before meals., Disp: 180 tablet, Rfl: 3     Allergies Reviewed:  Allergies[1]     History:  Reviewed:  Past Medical History:    Amenorrhea    Last period    Anxiety    Job threatened[place]-in counseling/fitness program    Chicken pox    Disorder of thyroid    in high school    Gastroparesis    Hemorrhoids    Dr. Lisette Coronado     History of hepatitis    History of stomach ulcers    dec 16 2024    Hypothyroidism    Lipid screening    Measles    Mumps    Viral hepatitis C    Visual impairment    glasses      Reviewed:  Past Surgical History:   Procedure Laterality Date    Colonoscopy  02/01/2008    Dr. Lisette Coronado, Repeat colonoscopy in five years. Family history of coloncancer     Colonoscopy N/A 03/24/2021    Procedure: COLONOSCOPY;  Surgeon: Nehemias Valdez MD;  Location: [facility] END[facility]    Needle biopsy right      benign     Tonsillectomy  1982    Upper gi endoscopy,exam        Reviewed Social History:  Social History     Socioeconomic History    Marital status: Single   Tobacco Use    Smoking status: Never    Smokeless tobacco: Never   Vaping Use    Vaping status: Never Used   Substance and Sexual Activity    Alcohol use: Not Currently     Comment: 1 drink a month    Drug use: No    Sexual activity: Never   Other Topics Concern    Caffeine Concern No    Stress Concern No    Weight Concern No    Special Diet No    Exercise Yes    Seat Belt Yes      Reviewed:  Family History   Problem Relation Age of Onset    Heart Disease Father         Coronary Artery Disease     Heart Surgery  Father         Coronary artery bypass grafting     Heart Disorder Father         Triple bypass 1983, no complications since    Lipids Father         Hyperlipidemia     Dementia Father     Hypertension Mother     Psychiatric Mother         Severe anxiety 1920's, Shock therapy 1962    Other (Pschyologicals problems) Mother     Depression Mother     Cancer Paternal Grandmother         Colon cancer 1993    Cancer Paternal Grandfather         Brain cancer 1959    Cancer Sister     Breast Cancer Sister 57    Stroke Other         Grandmother     Colon Cancer Other         Family history of         Review of Systems:  Review of Systems   Constitutional:  Negative for activity change, appetite change, chills, fatigue, fever and unexpected weight change.   HENT:  Negative for congestion and trouble swallowing.    Eyes:  Negative for discharge and redness.   Respiratory:  Negative for cough, chest tightness and shortness of breath.    Cardiovascular:  Negative for chest pain and leg swelling.   Gastrointestinal:  Negative for abdominal distention, abdominal pain and nausea.   Endocrine: Negative for polydipsia and polyuria.   Genitourinary:  Negative for difficulty urinating and dysuria.   Musculoskeletal:  Negative for myalgias.   Skin:  Negative for color change and pallor.   Allergic/Immunologic: Negative for immunocompromised state.   Neurological:  Negative for syncope and weakness.   Hematological:  Does not bruise/bleed easily.   Psychiatric/Behavioral:  Negative for agitation and confusion.         Physical Examination:  Physical Exam  Constitutional:       Appearance: She is well-developed.   HENT:      Head: Normocephalic.   Eyes:      Pupils: Pupils are equal, round, and reactive to light.   Cardiovascular:      Rate and Rhythm: Normal rate and regular rhythm.      Heart sounds: No murmur heard.  Pulmonary:      Effort: Pulmonary effort is normal. No respiratory distress.      Breath sounds: Normal breath sounds. No  wheezing or rales.   Abdominal:      General: There is no distension.      Palpations: Abdomen is soft.      Tenderness: There is no abdominal tenderness.   Musculoskeletal:      Cervical back: Normal range of motion.   Skin:     General: Skin is warm and dry.   Neurological:      Mental Status: She is alert and oriented to person, place, and time.        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  Tumor extends to 0.5 mm from closest specimen margin.  All surgical margins negative for tumor.  Five lymph nodes with no tumor identified (0/5).  pT3,N0.     Comment:  Sections demonstrate a well-circumscribed spindle cell tumor.  The spindle cells are arranged in a predominantly fascicular pattern and demonstrate minimal cytologic atypia.  The mitotic rate is low (2 mitoses per 5 mm²).  No necrosis is identified.  The tumor cells are positive for  and CD34; the Ki-67 proliferation index is low (< 2%).  These findings support the above diagnosis     Paraffin-embedded tumor (block A1) has been submitted for c-KIT and Platelet-Derived Growth Factor Receptor (PDGFR) mutation analysis.  Results will be conveyed as a linked report.        The immunohistochemical stains interpreted in this case demonstrate appropriate reactivity of positive controls.  The stains were performed on formalin-fixed, paraffin-embedded tissue sections with each antibody analysis being performed on a separate slide.        Electronically signed by Silvano Guerra MD on 12/19/2024 at  3:10 PM        Synoptic Report     GASTROINTESTINAL STROMAL TUMOR (GIST): Resection   8th Edition - Protocol posted: 12/14/2022GASTROINTESTINAL STROMAL TUMOR (GIST): RESECTION - All Specimens  SPECIMEN   Procedure  Partial gastrectomy   TUMOR   Tumor Focality  Unifocal   Tumor Site  Stomach: Lesser curvature   Histologic Type  Gastrointestinal stromal tumor, spindle cell type   Tumor  Size  Greatest Dimension (Centimeters): 7.1 cm   Mitotic Rate  2 mitoses per 5 mm2   Histologic Grade  G1, low grade   Necrosis  Not identified   Treatment Effect  No known presurgical therapy   Risk Assessment  Low risk   MARGINS   Margin Status  All margins negative for GIST   Closest Margin(s) to GIST  Omental (radial)   Distance from GIST to Closest Margin  0.5 mm   REGIONAL LYMPH NODES   Regional Lymph Node Status  All regional lymph nodes negative for tumor   Number of Lymph Nodes Examined  5   pTNM CLASSIFICATION (AJCC 8th Edition)   Reporting of pT, pN, and (when applicable) pM categories is based on information available to the pathologist at the time the report is issued. As per the AJCC (Chapter 1, 8th Ed.) it is the managing physician's responsibility to establish the final pathologic stage based upon all pertinent information, including but potentially not limited to this pathology report.   pT Category  pT3   pN Category  pN0   .        Clinical Information      C49.A2 Malignant Gastrointestinal Stromal Tumor (GIST) of Stomach (Hcc).        Gross Description      The specimen is labeled \"Mondragon, partial gastrectomy, gastric tumor\" received in formalin. The specimen consists of a 111 gram unoriented bulging subserosal mass measuring 7.1 x 5.9 x 5.1 cm. The peripheral/mucosal margin is inked orange, the serosal surface is inked black. The specimen is serially sectioned to reveal a pink-tan whorled cut surface. The mass is <0.1 cm from the nearest peripheral/mucosal margin. No mucosal or serosal invasion is grossly identified. Representative sections are submitted as follows: A1-A5, mass with closest peripheral/mucosal margins including mucosal side and serosal side; A6-A8, additional sections of mass (serosal side); A9-A10, additional sections of mass (mucosal side). (loreeq)      Silvano Guerra M.D./trent       Study Result    Narrative   PROCEDURE: CT ABDOMEN + PELVIS (CONTRAST ONLY) (CPT=74177)      COMPARISON: [facility], CT CALCIUM SCORING OVER READ, 7/02/2024, 1:11 PM.     INDICATIONS: History of non-healing gastric ulcer revealed on an EGD of 10/21/2024.     TECHNIQUE: CT images of the abdomen and pelvis were obtained with non-ionic intravenous contrast material.  Automated exposure control for dose reduction was used. Adjustment of the mA and/or kV was done based on the patient's size. Use of iterative  reconstruction technique for dose reduction was used.  Dose information is transmitted to the ACR (American College of Radiology) NRDR (National Radiology Data Registry) which includes the Dose Index Registry.     FINDINGS:     The following findings were made:     1. There is a 6.7 x 5.6 by 5.6 cm well-defined lobulated heterogeneous though mainly low attenuated mass like density along the lesser curve of the stomach.  There there are a few clips at the periphery of this mass.  This could represent a primary  gastric malignancy with central ulceration or a gyn gastric ulcer with contained hematoma.  There is no CT evidence of gastric perforation.     2. Immediately superior and anterior to the aforementioned mass is a 2.4 by 1.4 cm low attenuated lesion with a focal peripheral calcification at the lateral aspect of the left hepatic lobe.  This raises possibility of localized metastatic spread .  No  other hepatic lesions are identified.     3. There are a few small calcified fibroids within an otherwise normal appearing uterus.     The remainder of the examination is unremarkable.  Specifically, the lung bases are clear.  The visualized aspects of the spleen, pancreas, gallbladder, adrenals, kidneys, small bowel, colon, and remaining soft tissues demonstrated grossly normal CT  appearance for patient of this age.  There is no free fluid or organized fluid collection.  There is no significant adenopathy.               Impression   CONCLUSION: There is a 6.7 x 5.6 by 5.6 cm  well-defined lobulated heterogeneous though mainly low attenuated mass like density along the lesser curve of the stomach.  There there are a few clips at the periphery of this mass.  This could represent a  primary gastric malignancy with central ulceration or a gyn gastric ulcer with contained hematoma.  There is no CT evidence of gastric perforation. Immediately superior and anterior to the aforementioned mass is a 2.4 by 1.4 cm low attenuated lesion with   a focal peripheral calcification at the lateral aspect of the left hepatic lobe.  This raises possibility of localized metastatic spread .  No other hepatic lesions are identified. Further evaluation with biopsy is recommended.          Dictated by (CST): Augustine Mesa MD on 11/01/2024 at 9:56 AM      Finalized by (CST): Augustine Mesa MD on 11/01/2024 at 10:14 AM           Study Result    Narrative   PROCEDURE: XR UPPER GI DOUBLE CONTRAST (CPT=74246)     COMPARISON: None.     INDICATIONS: Malignant gastrointestinal stromal tumor (GIST) of stomach (HCC).  Complains of early satiety difficulty digesting food and bloating.     TECHNIQUE: Air contrast upper gastrointestinal series was performed in the usual manner.       FLUOROSCOPY IMAGES OBTAINED:  40  FLUOROSCOPY TIME:  3.5 minutes  RADIATION DOSE (Dose Area Product):  267.0-uGy*m^2        FINDINGS:  ESOPHAGUS: Normal caliber esophagus.  Mild gastroesophageal reflux.  STOMACH: Large amount of retained food material within the stomach (patient NPO >12 hours).  This limits evaluation for subtle mucosal abnormalities within the gastric lumen.  This moves about in a dependent fashion along with the ingested barium.    Findings presumably related to large volume of undigested material without gastric outlet obstruction.  Findings may reflect mild gastroparesis without outlet obstruction.  DUODENUM: Normal caliber duodenum.  OTHER: Dextroscoliosis lumbar spine. Moderate stool throughout the colon consistent  with constipation/fecal retention.               Impression   CONCLUSION:  1. Normal caliber esophagus.  Mild gastroesophageal reflux.  2. Large amount of retained food material within the gastric lumen despite NPO >12 hours.  Retained food material within the lumen limits evaluation for subtle mucosal abnormalities.  Suspect mild component of gastro paresis without outlet obstruction.    Normal duodenum.  3. Recommend upper endoscopy for further evaluation.       Study Result    Narrative   PROCEDURE: CT ABDOMEN + PELVIS (CONTRAST ONLY) (CPT=74177)     COMPARISON: [facility], PET STANDARD BODY SCAN (CPT=78815), 11/26/2024, 8:33 AM.  [facility], CT ABDOMEN + PELVIS (CONTRAST ONLY) (CPT=74177), 11/01/2024, 8:54 AM.     INDICATIONS: History of GIST & gastric ulcer, status post 10% gastrectomy (12/15/2024), with complaints of slow gastric emptying since March 2025.     TECHNIQUE: Multidetector CT images of the abdomen and pelvis were obtained with non-ionic intravenous contrast material. Automated exposure control for dose reduction was used. Adjustment of the mA and/or kV was done based on the patient's size.  Iterative reconstruction technique for dose reduction was employed. Dose information was transmitted to the ACR (American College of Radiology) NRDR (National Radiology Data Registry), which includes the Dose Index Registry. Oral contrast was not  ingested.     FINDINGS:  LUNG BASES: The heart is normal in size. There is no visible pulmonary or pleural disease.  LIVER: The liver is enlarged, measuring 18.2 cm. In the lateral segment of the left hepatic lobe, there is redemonstration of ovoid well-circumscribed 2.2 x 1.2 x 2.7 cm lesion with discontinuous peripheral nodular enhancement. An additional ovoid lesion   of the right hepatic lobe is seen measuring 0.8 x 1.4 cm.  BILIARY: The gallbladder is present.  PANCREAS: No lesion, fluid collection, ductal  dilatation, or atrophy.    SPLEEN: No enlargement. A subcentimeter hypodensity is present.    ADRENALS:   No defined mass or abnormal enlargement.    KIDNEYS:   Symmetric enhancement is seen without evidence of hydronephrosis or underlying solid masses.  GI/MESENTERY: A small hiatal hernia is evident. Apparent gastric wall thickening is likely secondary to underdistention. There has been removal of the gastric mass involving the lesser curvature of the stomach. There is no evidence of bowel obstruction.   A normal caliber gas-filled appendix is seen without inflammatory manifestations. A heavy stool burden is demonstrated. Scattered colonic diverticula are present in the sigmoid colon. There is no colonic wall thickening or pericolonic fat stranding.    URINARY BLADDER: Incompletely distended without visible calculus. Mild circumferential bladder wall thickening may relate to underdistention.  PELVIC NODES: No lymphadenopathy.    PELVIC ORGANS: The uterus is present. Coarsely calcified intramural and subserosal lesions are apparent. Deep pelvic calcifications likely represent phleboliths.    VASCULATURE:   Trace atherosclerotic vascular calcifications of the abdominal aorta are observed. No aneurysm is detected.  RETROPERITONEUM: No mass or lymphadenopathy is apparent.    BONES:   There is trace anterolisthesis of L4 on L5. Dextroscoliosis of the lumbar spine is present. Multilevel degenerative are seen throughout the spine.  ABDOMINAL WALL: There is a minute fat-containing umbilical hernia.  OTHER: No free air or fluid is seen in the abdomen or pelvis.                    Impression   CONCLUSION:  1. No evidence of intra-abdominal/intrapelvic residual or metastatic disease.     2. Status post resection of previously seen gastric GIST.     3. Hepatomegaly. Indolent-appearing hepatic lesions are grossly unchanged; the larger may represent a hemangioma.     4. Degenerating intramural and subserosal uterine fibroids.      5. A subcentimeter splenic hypodensity is not fully characterized, but is unchanged and likely indolent.       6. Lesser incidental findings as above.          Assessment / Plan:  Reviewed CT  Remains KINGS  Had a long discussion with patient and brother regarding her symptoms  I agree with Dr. Donovan, proceed with formal gastric emptying study  Clinically and radiographically consistent with gastroparesis, likely postsurgical  Not tolerating Reglan, will discuss with Dr. Donovan value of erythromycin  Will reach out to colleagues at tertiary centers to discuss other options [Endoflip to evaluate pylorus and possibly G POEM]    Demetrio Dickinson MD  Complex General Surgical Oncology  [facility]  [email or web address]        Follow Up:  No follow-ups on file.       Electronically Signed by: Demetrio Dickinson MD         [1] No Known Allergies

## 2025-06-11 ENCOUNTER — TELEPHONE (OUTPATIENT)
Dept: GASTROENTEROLOGY | Facility: CLINIC | Age: 65
End: 2025-06-11

## 2025-06-11 DIAGNOSIS — K31.84 GASTROPARESIS: Primary | ICD-10-CM

## 2025-06-11 NOTE — TELEPHONE ENCOUNTER
RN faxed referral, clinic notes, endoscopy report, CT report, and facesheet to [facility]. Received confirmation that fax was transmitted successfully.   Fax #[phone]    [employer] message sent to pt informing her that referral was faxed as requested.     RN called [facility] to verify fax number to GI/gastroparesis clinic.

## 2025-06-11 NOTE — TELEPHONE ENCOUNTER
Referral placed for [facility]. I'll call her today to go over some things we can try in the meantime for her gastroparesis.    Thanks! SS

## 2025-06-12 ENCOUNTER — PATIENT MESSAGE (OUTPATIENT)
Dept: GASTROENTEROLOGY | Facility: CLINIC | Age: 65
End: 2025-06-12

## 2025-06-13 ENCOUNTER — PATIENT MESSAGE (OUTPATIENT)
Facility: CLINIC | Age: 65
End: 2025-06-13

## 2025-06-13 NOTE — TELEPHONE ENCOUNTER
Dr. Dickinson reviewed patient's message and states case is being discussed with colleagues and will call patient back Monday, 6/16/25.     Notified patient of above. Dr. Dickinson to call patient Monday, 6/16. All questions answered, advised to call back for any future questions or concerns. Patient verbalized understanding.

## 2025-06-17 NOTE — TELEPHONE ENCOUNTER
Dr. Dickinson spoke to patient regarding below. Informed patient he discussed case and is referring her to Dr. Teodoro Aadms with [facility].

## 2025-06-17 NOTE — TELEPHONE ENCOUNTER
Left message confirming Dr. Teodoro Adams's information. Reminded patient Dr. Adams's office will call to schedule patient. Advised to call back for any future questions or concerns.

## 2025-06-27 NOTE — TELEPHONE ENCOUNTER
I called the patient to see if she would like to schedule a virtual appointment with Dr. Donovan to further discuss her questions/concerns    Patient states that she followed up with Dr. Dickinson on 6/10/2025 and all of her questions were answered in full    Patient declined to follow up with Dr. Donovan at this time

## 2025-07-07 ENCOUNTER — HOSPITAL ENCOUNTER (OUTPATIENT)
Dept: NUCLEAR MEDICINE | Facility: HOSPITAL | Age: 65
Discharge: HOME | End: 2025-07-07
Payer: MEDICARE

## 2025-07-07 DIAGNOSIS — C49.A2 MALIGNANT GASTROINTESTINAL STROMAL TUMOR (GIST) OF STOMACH (HCC): ICD-10-CM

## 2025-07-07 PROCEDURE — 78264 GASTRIC EMPTYING IMG STUDY: CPT

## 2025-07-28 ENCOUNTER — LAB ENCOUNTER (OUTPATIENT)
Dept: LAB | Age: 65
End: 2025-07-28
Attending: PHYSICIAN ASSISTANT
Payer: MEDICARE

## 2025-07-28 ENCOUNTER — OFFICE VISIT (OUTPATIENT)
Dept: FAMILY MEDICINE CLINIC | Facility: CLINIC | Age: 65
End: 2025-07-28
Payer: MEDICARE

## 2025-07-28 VITALS
OXYGEN SATURATION: 97 % | BODY MASS INDEX: 21.66 KG/M2 | WEIGHT: 138 LBS | HEART RATE: 64 BPM | RESPIRATION RATE: 16 BRPM | DIASTOLIC BLOOD PRESSURE: 60 MMHG | SYSTOLIC BLOOD PRESSURE: 110 MMHG | HEIGHT: 67 IN

## 2025-07-28 DIAGNOSIS — R73.09 ELEVATED GLUCOSE: ICD-10-CM

## 2025-07-28 DIAGNOSIS — K31.84 NONDIABETIC GASTROPARESIS: ICD-10-CM

## 2025-07-28 DIAGNOSIS — Z00.00 GENERAL MEDICAL EXAM: ICD-10-CM

## 2025-07-28 DIAGNOSIS — Z12.31 VISIT FOR SCREENING MAMMOGRAM: ICD-10-CM

## 2025-07-28 DIAGNOSIS — Z01.818 PREOPERATIVE EXAMINATION: ICD-10-CM

## 2025-07-28 DIAGNOSIS — C49.A0 GASTROINTESTINAL STROMAL TUMOR (GIST) (HCC): ICD-10-CM

## 2025-07-28 DIAGNOSIS — Z01.818 PREOPERATIVE EXAMINATION: Primary | ICD-10-CM

## 2025-07-28 DIAGNOSIS — D50.0 IRON DEFICIENCY ANEMIA DUE TO CHRONIC BLOOD LOSS: ICD-10-CM

## 2025-07-28 PROBLEM — D21.4 BENIGN GASTROINTESTINAL STROMAL TUMOR (GIST): Status: RESOLVED | Noted: 2024-12-16 | Resolved: 2025-07-28

## 2025-07-28 PROBLEM — D62 ACUTE BLOOD LOSS ANEMIA: Status: RESOLVED | Noted: 2024-07-05 | Resolved: 2025-07-28

## 2025-07-28 PROBLEM — K92.2 GASTROINTESTINAL HEMORRHAGE, UNSPECIFIED GASTROINTESTINAL HEMORRHAGE TYPE: Status: RESOLVED | Noted: 2024-07-04 | Resolved: 2025-07-28

## 2025-07-28 LAB
ALBUMIN SERPL-MCNC: 4.7 G/DL (ref 3.2–4.8)
ALBUMIN/GLOB SERPL: 2 (ref 1–2)
ALP LIVER SERPL-CCNC: 39 U/L (ref 55–142)
ALT SERPL-CCNC: 19 U/L (ref 10–49)
ANION GAP SERPL CALC-SCNC: 7 MMOL/L (ref 0–18)
AST SERPL-CCNC: 20 U/L (ref ?–34)
BASOPHILS # BLD AUTO: 0.06 X10(3) UL (ref 0–0.2)
BASOPHILS NFR BLD AUTO: 0.6 %
BILIRUB SERPL-MCNC: 0.6 MG/DL (ref 0.2–1.1)
BUN BLD-MCNC: 22 MG/DL (ref 9–23)
CALCIUM BLD-MCNC: 10.8 MG/DL (ref 8.7–10.6)
CHLORIDE SERPL-SCNC: 105 MMOL/L (ref 98–112)
CHOLEST SERPL-MCNC: 177 MG/DL (ref ?–200)
CO2 SERPL-SCNC: 30 MMOL/L (ref 21–32)
CREAT BLD-MCNC: 0.99 MG/DL (ref 0.55–1.02)
DEPRECATED HBV CORE AB SER IA-ACNC: 106 NG/ML (ref 50–306)
EGFRCR SERPLBLD CKD-EPI 2021: 63 ML/MIN/1.73M2 (ref 60–?)
EOSINOPHIL # BLD AUTO: 0.06 X10(3) UL (ref 0–0.7)
EOSINOPHIL NFR BLD AUTO: 0.6 %
ERYTHROCYTE [DISTWIDTH] IN BLOOD BY AUTOMATED COUNT: 12.9 %
EST. AVERAGE GLUCOSE BLD GHB EST-MCNC: 120 MG/DL (ref 68–126)
FASTING PATIENT LIPID ANSWER: NO
FASTING STATUS PATIENT QL REPORTED: NO
GLOBULIN PLAS-MCNC: 2.4 G/DL (ref 2–3.5)
GLUCOSE BLD-MCNC: 107 MG/DL (ref 70–99)
HBA1C MFR BLD: 5.8 % (ref ?–5.7)
HCT VFR BLD AUTO: 46.3 % (ref 35–48)
HDLC SERPL-MCNC: 50 MG/DL (ref 40–59)
HGB BLD-MCNC: 15 G/DL (ref 12–16)
IMM GRANULOCYTES # BLD AUTO: 0.02 X10(3) UL (ref 0–1)
IMM GRANULOCYTES NFR BLD: 0.2 %
IRON SATN MFR SERPL: 32 % (ref 15–50)
IRON SERPL-MCNC: 102 UG/DL (ref 50–170)
LDLC SERPL CALC-MCNC: 109 MG/DL (ref ?–100)
LYMPHOCYTES # BLD AUTO: 1.28 X10(3) UL (ref 1–4)
LYMPHOCYTES NFR BLD AUTO: 13.6 %
MCH RBC QN AUTO: 31.5 PG (ref 26–34)
MCHC RBC AUTO-ENTMCNC: 32.4 G/DL (ref 31–37)
MCV RBC AUTO: 97.3 FL (ref 80–100)
MONOCYTES # BLD AUTO: 0.45 X10(3) UL (ref 0.1–1)
MONOCYTES NFR BLD AUTO: 4.8 %
NEUTROPHILS # BLD AUTO: 7.57 X10 (3) UL (ref 1.5–7.7)
NEUTROPHILS # BLD AUTO: 7.57 X10(3) UL (ref 1.5–7.7)
NEUTROPHILS NFR BLD AUTO: 80.2 %
NONHDLC SERPL-MCNC: 127 MG/DL (ref ?–130)
OSMOLALITY SERPL CALC.SUM OF ELEC: 298 MOSM/KG (ref 275–295)
PLATELET # BLD AUTO: 187 10(3)UL (ref 150–450)
PLATELETS.RETICULATED NFR BLD AUTO: 11.7 % (ref 0–7)
POTASSIUM SERPL-SCNC: 4.1 MMOL/L (ref 3.5–5.1)
PROT SERPL-MCNC: 7.1 G/DL (ref 5.7–8.2)
RBC # BLD AUTO: 4.76 X10(6)UL (ref 3.8–5.3)
SODIUM SERPL-SCNC: 142 MMOL/L (ref 136–145)
TOTAL IRON BINDING CAPACITY: 322 UG/DL (ref 250–425)
TRANSFERRIN SERPL-MCNC: 252 MG/DL (ref 250–380)
TRIGL SERPL-MCNC: 100 MG/DL (ref 30–149)
TSI SER-ACNC: 2.88 UIU/ML (ref 0.55–4.78)
VLDLC SERPL CALC-MCNC: 17 MG/DL (ref 0–30)
WBC # BLD AUTO: 9.4 X10(3) UL (ref 4–11)

## 2025-07-28 PROCEDURE — 36415 COLL VENOUS BLD VENIPUNCTURE: CPT

## 2025-07-28 PROCEDURE — 80053 COMPREHEN METABOLIC PANEL: CPT

## 2025-07-28 PROCEDURE — 83550 IRON BINDING TEST: CPT

## 2025-07-28 PROCEDURE — 84443 ASSAY THYROID STIM HORMONE: CPT

## 2025-07-28 PROCEDURE — 82728 ASSAY OF FERRITIN: CPT

## 2025-07-28 PROCEDURE — 80061 LIPID PANEL: CPT

## 2025-07-28 PROCEDURE — 83036 HEMOGLOBIN GLYCOSYLATED A1C: CPT

## 2025-07-28 PROCEDURE — 83540 ASSAY OF IRON: CPT

## 2025-07-28 PROCEDURE — 85025 COMPLETE CBC W/AUTO DIFF WBC: CPT

## 2025-07-28 NOTE — PROGRESS NOTES
The following individual(s) verbally consented to be recorded using ambient AI listening technology and understand that they can each withdraw their consent to this listening technology at any point by asking the clinician to turn off or pause the recording:    Patient name: Mikayla Mondragon

## 2025-07-28 NOTE — PROGRESS NOTES
Subjective:   Patient ID: Mikayla Mondragon is a 64 year old female.    HPI  Patient presents for preop exam.    Surgery: G-POEM  Date: 8/7/2025  Surgeon: Dr. 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, recent gastric emptying study shows severe abnormality    Diet: well balanced when she is able to eat  Exercise: walking a few miles daily  Tobacco use: denies  Drug use: denies  Alcohol use: denies    NSAIDs/Antiplatelets/Anticoagulation: None taken  Vitamins/supplements: daily vitamins, advised to stop 7 days prior to procedure  Personal or family history of heart attack/stroke: no  Personal or family history of complications from anesthesia: no    Labs done today:  CMP with glucose 107 (non-fasting), Ca 10.8  CBC near normal    EKG 6/2025 with sinus rhythm, no ischemia      History/Other:   Review of Systems   Constitutional:  Negative for chills, fatigue and fever.   HENT:  Negative for congestion, ear pain, rhinorrhea and sore throat.    Eyes:  Negative for visual disturbance.   Respiratory:  Negative for cough, shortness of breath and wheezing.    Cardiovascular:  Negative for chest pain, palpitations and leg swelling.   Gastrointestinal:  Positive for abdominal distention and abdominal pain. Negative for diarrhea, nausea and vomiting.   Genitourinary:  Negative for dysuria, frequency and hematuria.   Musculoskeletal:  Negative for arthralgias, gait problem and myalgias.   Skin:  Negative for rash.   Neurological:  Negative for weakness, light-headedness and headaches.   Hematological:  Negative for adenopathy.   Psychiatric/Behavioral:  Negative for dysphoric mood. The patient is not nervous/anxious.      Current Medications[1]  Allergies:Allergies[2]    Objective:   Physical Exam  Vitals and nursing note reviewed.   Constitutional:       General: She is not in acute distress.     Appearance: Normal appearance. She is well-developed.    HENT:      Head: Normocephalic and atraumatic.      Right Ear: Tympanic membrane, ear canal and external ear normal.      Left Ear: Tympanic membrane, ear canal and external ear normal.      Nose: Nose normal.      Mouth/Throat:      Mouth: Mucous membranes are moist.   Eyes:      Extraocular Movements: Extraocular movements intact.      Conjunctiva/sclera: Conjunctivae normal.      Pupils: Pupils are equal, round, and reactive to light.   Neck:      Thyroid: No thyromegaly.   Cardiovascular:      Rate and Rhythm: Normal rate and regular rhythm.      Pulses: Normal pulses.      Heart sounds: Normal heart sounds.   Pulmonary:      Effort: Pulmonary effort is normal.      Breath sounds: Normal breath sounds. No wheezing or rales.   Abdominal:      General: Bowel sounds are normal. There is no distension.      Palpations: Abdomen is soft. There is no mass.      Tenderness: There is no abdominal tenderness.   Musculoskeletal:         General: No tenderness. Normal range of motion.      Cervical back: Normal range of motion and neck supple.   Lymphadenopathy:      Cervical: No cervical adenopathy.   Skin:     General: Skin is warm and dry.      Findings: No rash.   Neurological:      General: No focal deficit present.      Mental Status: She is alert and oriented to person, place, and time.   Psychiatric:         Mood and Affect: Mood normal.         Behavior: Behavior normal.         Assessment & Plan:   1. Preoperative examination  Physical exam is unremarkable. Pre-op tests reviewed and patient is medically stable for surgery. Advised to avoid all NSAIDs and supplements for 7 days prior to procedure. Contact the office if symptoms of a cough, urinary infection, or skin infection develop prior to procedure. Follow-up with PCP 1-2 weeks after procedure.  - CBC With Differential With Platelet; Future  - Comp Metabolic Panel (14) [E]; Future    2. Gastrointestinal stromal tumor (GIST) (HCC)  3. Nondiabetic  gastroparesis  S/o tumor resection c/b bleeding ulcer and severe gastroparesis. Planned surgery as above.     4. General medical exam  Physical exam is unremarkable.  Check fasting labs.  Health maintenance issues discussed. Encouraged routine vaccines.  Advised healthy diet and regular exercise.  Annual physicals.  - TSH W Reflex To Free T4; Future  - Lipid Panel [E]; Future    5. Visit for screening mammogram  - [facility] ADRIEL 2D+3D SCREENING BILAT (CPT=77067/34480); Future           [1]   Current Outpatient Medications   Medication Sig Dispense Refill    pantoprazole 40 MG Oral Tab EC Take 1 tablet (40 mg total) by mouth 2 (two) times daily before meals. 180 tablet 3   [2] No Known Allergies

## 2025-07-29 ENCOUNTER — TELEPHONE (OUTPATIENT)
Dept: FAMILY MEDICINE CLINIC | Facility: CLINIC | Age: 65
End: 2025-07-29

## 2025-08-18 ENCOUNTER — TELEPHONE (OUTPATIENT)
Dept: FAMILY MEDICINE CLINIC | Facility: CLINIC | Age: 65
End: 2025-08-18

## 2025-08-18 DIAGNOSIS — M25.9 KNEE PROBLEM: Primary | ICD-10-CM

## 2025-08-18 DIAGNOSIS — R20.2 PARESTHESIAS: ICD-10-CM

## (undated) DEVICE — KIT ENDO ORCAPOD 160/180/190

## (undated) DEVICE — SEAL

## (undated) DEVICE — MEDI-VAC NON-CONDUCTIVE SUCTION TUBING 6MM X 1.8M (6FT.) L: Brand: CARDINAL HEALTH

## (undated) DEVICE — GLOVE SUR 7 SENSICARE PI PIP CRM PWD F

## (undated) DEVICE — LINE MNTR ADLT SET O2 INTMD

## (undated) DEVICE — INSUFFLATION NEEDLE TO ESTABLISH PNEUMOPERITONEUM.: Brand: INSUFFLATION NEEDLE

## (undated) DEVICE — PISTOL GRIP SKIN STAPLER: Brand: MULTIFIRE PREMIUM

## (undated) DEVICE — SOLUTION IRRIG 1000ML 0.9% NACL USP BTL

## (undated) DEVICE — TRAY CATH FOLEY 16FR INCLUDE BARDX IC COMPLT CARE

## (undated) DEVICE — ABSORBABLE WOUND CLOSURE DEVICE: Brand: V-LOC 90

## (undated) DEVICE — GOWN,SIRUS,FAB REINF,RAGLAN,L,STERILE: Brand: MEDLINE

## (undated) DEVICE — ECHELON FLEX  POWERED VASCULAR STAPLER WITH ADVANCED PLACEMENT TIP, 35MM: Brand: ECHELON FLEX

## (undated) DEVICE — SUT PERMA- 2-0 30IN NABSRB BLK TIE SILK

## (undated) DEVICE — SYRINGE IRRIG 60ML TOOM TIP BOLD 2 GRAD RIG

## (undated) DEVICE — ARM DRAPE

## (undated) DEVICE — TIP COVER ACCESSORY

## (undated) DEVICE — UNDYED BRAIDED (POLYGLACTIN 910), SYNTHETIC ABSORBABLE SUTURE: Brand: COATED VICRYL

## (undated) DEVICE — MEGA SUTURECUT ND: Brand: ENDOWRIST

## (undated) DEVICE — SYRINGE, LUER SLIP, STERILE, 60ML: Brand: MEDLINE

## (undated) DEVICE — BALLOON HEMOSTATIC EUS RADIAL

## (undated) DEVICE — KIT CLEAN ENDOKIT 1.1OZ GOWNX2

## (undated) DEVICE — TIP-UP FENESTRATED GRASPER: Brand: ENDOWRIST

## (undated) DEVICE — TOWEL,OR,DSP,ST,BLUE,DLX,2/PK,40PK/CS: Brand: MEDLINE

## (undated) DEVICE — SUT PDS II 2-0 27IN SH ABSRB VLT L26MM 1/2

## (undated) DEVICE — DRAPE,ABDOMINAL,MAJOR,STERILE: Brand: MEDLINE

## (undated) DEVICE — STERILE LATEX POWDER-FREE SURGICAL GLOVESWITH NITRILE COATING: Brand: PROTEXIS

## (undated) DEVICE — BALLOON HEMOSTATIC EUS LINEAR

## (undated) DEVICE — Device: Brand: CUSTOM PROCEDURE KIT

## (undated) DEVICE — TUBING MEGADYNE LAPAROSCOPIC

## (undated) DEVICE — BLADELESS OBTURATOR: Brand: WECK VISTA

## (undated) DEVICE — AIRSEAL TRI-LUMEN LILTERED TUBE SET: Brand: AIRSEAL

## (undated) DEVICE — LUBRICANT ELECTRD 4ML ANTISTICK SOL W/ FOAM

## (undated) DEVICE — MEDI-VAC NON-CONDUCTIVE SUCTION TUBING: Brand: CARDINAL HEALTH

## (undated) DEVICE — CONMED SCOPE SAVER BITE BLOCK, 20X27 MM: Brand: SCOPE SAVER

## (undated) DEVICE — 3M™ IOBAN™ 2 ANTIMICROBIAL INCISE DRAPE 6650EZ: Brand: IOBAN™ 2

## (undated) DEVICE — GAMMEX® NON-LATEX PI ORTHO SIZE 7.5, STERILE POLYISOPRENE POWDER-FREE SURGICAL GLOVE: Brand: GAMMEX

## (undated) DEVICE — AIRSEAL 5 MM ACCESS PORT AND LOW PROFILE OBTURATOR WITH BLADELESS OPTICAL TIP, 120 MM LENGTH: Brand: AIRSEAL

## (undated) DEVICE — ECHOTIP ACUCORE EUS BIOPSY NEEDLE: Brand: ECHOTIP ACUCORE

## (undated) DEVICE — DISPOSABLE SUCTION/IRRIGATOR TUBE SET: Brand: AHTO

## (undated) DEVICE — PENROSE DRAIN 12" X 1/4: Brand: CARDINAL HEALTH

## (undated) DEVICE — V2 SPECIMEN COLLECTION MANIFOLD KIT: Brand: NEPTUNE

## (undated) DEVICE — 3 ML SYRINGE LUER-LOCK TIP: Brand: MONOJECT

## (undated) DEVICE — SUT PROL 4-0 36IN RB-1 NABSRB BLU 17MM 1/2 CI

## (undated) DEVICE — CO2 CANNULA,SSOFT,ADLT,7O2,4CO2,FEMALE: Brand: MEDLINE

## (undated) DEVICE — CANNULA SEAL

## (undated) DEVICE — PAD,NON-ADHERENT,3X8,STERILE,LF,1/PK: Brand: MEDLINE

## (undated) DEVICE — YANKAUER,BULB TIP,W/O VENT,RIGID,STERILE: Brand: MEDLINE

## (undated) DEVICE — SUT COAT VCRL 0 27IN CT-1 ABSRB VLT 36MM 1/2

## (undated) DEVICE — ROBOTIC: Brand: MEDLINE INDUSTRIES, INC.

## (undated) DEVICE — GIJAW SINGLE-USE BIOPSY FORCEPS WITH NEEDLE: Brand: GIJAW

## (undated) DEVICE — VESSEL SEALER EXTEND: Brand: ENDOWRIST

## (undated) DEVICE — COLUMN DRAPE

## (undated) DEVICE — 35 ML SYRINGE REGULAR TIP: Brand: MONOJECT

## (undated) DEVICE — SUT PERMA- 3-0 30IN SH NABSRB BLK 26MM 1/2

## (undated) DEVICE — KIT,ANTI FOG,W/SPONGE & FLUID,SOFT PACK: Brand: MEDLINE

## (undated) DEVICE — SUT COAT VCRL + 0 54IN ABSRB UD ANTIBACT

## (undated) DEVICE — SUT VCRL 0 L18IN ABSRB VLT POLYGLACTIN 910

## (undated) DEVICE — SLIM BODY SKIN STAPLER: Brand: APPOSE ULC

## (undated) DEVICE — GLOVE SUR 6.5 SENSICARE PI PIP CRM PWD F

## (undated) DEVICE — SUT PERMA- 2-0 30IN SH NABSRB BLK L26MM 1/

## (undated) DEVICE — 6 ML SYRINGE LUER-LOCK TIP: Brand: MONOJECT

## (undated) DEVICE — SOLUTION IV 1000ML 0.9% NACL PRESERVATIVE

## (undated) DEVICE — PAD POS 36IN DISP SURGYPAD

## (undated) DEVICE — HARMONIC 1100 SHEARS, 20CM SHAFT LENGTH: Brand: HARMONIC

## (undated) DEVICE — ANTIBACTERIAL UNDYED BRAIDED (POLYGLACTIN 910), SYNTHETIC ABSORBABLE SUTURE: Brand: COATED VICRYL

## (undated) DEVICE — Device: Brand: DEFENDO AIR/WATER/SUCTION AND BIOPSY VALVE

## (undated) DEVICE — TISSUE RETRIEVAL SYSTEM: Brand: INZII RETRIEVAL SYSTEM

## (undated) DEVICE — THE ECHELON FLEX POWERED PLUS ARTICULATING ENDOSCOPIC LINEAR CUTTERS ARE STERILE, SINGLE PATIENT USE INSTRUMENTS THAT SIMULTANEOUSLYCUT AND STAPLE TISSUE. THERE ARE SIX STAGGERED ROWS OF STAPLES, THREE ON EITHER SIDE OF THE CUT LINE. THE ECHELON FLEX 45 POWERED PLUSINSTRUMENTS HAVE A STAPLE LINE THAT IS APPROXIMATELY 45 MM LONG AND A CUT LINE THAT IS APPROXIMATELY 42 MM LONG. THE SHAFT CAN ROTATE FREELYIN BOTH DIRECTIONS AND AN ARTICULATION MECHANISM ENABLES THE DISTAL PORTION OF THE SHAFT TO PIVOT TO FACILITATE LATERAL ACCESS TO THE OPERATIVESITE.THE INSTRUMENTS ARE PACKAGED WITH A PRIMARY LITHIUM BATTERY PACK THAT MUST BE INSTALLED PRIOR TO USE. THERE ARE SPECIFIC REQUIREMENTS FORDISPOSING OF THE BATTERY PACK. REFER TO THE BATTERY PACK DISPOSAL SECTION.THE INSTRUMENTS ARE PACKAGED WITHOUT A RELOAD AND MUST BE LOADED PRIOR TO USE. A STAPLE RETAINING CAP ON THE RELOAD PROTECTS THE STAPLE LEGPOINTS DURING SHIPPING AND TRANSPORTATION. THE INSTRUMENTS’ LOCK-OUT FEATURE IS DESIGNED TO PREVENT A USED OR IMPROPERLY INSTALLED RELOADFROM BEING REFIRED OR AN INSTRUMENT FROM BEING FIRED WITHOUT A RELOAD.: Brand: ECHELON FLEX

## (undated) NOTE — LETTER
[facility], [place]  Authorization for Invasive Procedures  1.  I hereby authorize Dr. Pablo Luna** , my physician and whomever may be designated as the doctor's assistant, to perform the following operation and/or procedure:  *COLONO allergic reactions, hemolytic reactions, transmission of disease such as hepatitis, AIDS, cytomegalovirus (CMV), and flluid overload.  In the event that I wish to have autologous transfusions of my own blood, or a directed donor transfusion, I will discuss Patient:  ________________________________________________ Date: _________Time: _________    Responsible person in case of minor or unconscious: _____________________________Relationship: ____________     Witness Signature: ________________________________

## (undated) NOTE — Clinical Note
TCM assessment completed.  The patient is scheduled for a follow up appointment with you on 01/13/2025. A telephone encounter has been sent to clinical staff to assist with scheduling a sooner appointment.  Thank you.

## (undated) NOTE — LETTER
[place] [address]  Authorization for Invasive Procedures  Date: 07/05/2024           Time: 11:42    I hereby authorize Dr. Mary Lou Donovan, my physician and his/her assistants (if applicable), which may include medical students, residents, and/or fellows, to perform the following surgical operation/ procedure and administer such anesthesia as may be determined necessary by my physician: esophagogastroduodenoscopy on Mikayla Mondragon  2.   I recognize that during the surgical operation/procedure, unforeseen conditions may necessitate additional or different procedures than those listed above.  I, therefore, further authorize and request that the above-named surgeon, assistants, or designees perform such procedures as are, in their judgment, necessary and desirable.    3.   My surgeon/physician has discussed prior to my surgery the potential benefits, risks and side effects of this procedure; the likelihood of achieving goals; and potential problems that might occur during recuperation.  They also discussed reasonable alternatives to the procedure, including risks, benefits, and side effects related to the alternatives and risks related to not receiving this procedure.  I have had all my questions answered and I acknowledge that no guarantee has been made as to the result that may be obtained.    4.   Should the need arise during my operation/procedure, which includes change of level of care prior to discharge, I also consent to the administration of blood and/or blood products.  Further, I understand that despite careful testing and screening of blood or blood products by collecting agencies, I may still be subject to ill effects as a result of receiving a blood transfusion and/or blood products.  The following are some, but not all, of the potential risks that can occur: fever and allergic reactions, hemolytic reactions, transmission of diseases such as Hepatitis, AIDS and Cytomegalovirus (CMV)  and fluid overload.  In the event that I wish to have an autologous transfusion of my own blood, or a directed donor transfusion, I will discuss this with my physician.   Check only if Refusing Blood or Blood Products  I understand refusal of blood or blood products as deemed necessary by my physician may have serious consequences to my condition to include possible death. I hereby assume responsibility for my refusal and release the hospital, its personnel, and my physicians from any responsibility for the consequences of my refusal.         o  Refuse         5.   I authorize the use of any specimen, organs, tissues, body parts or foreign objects that may be removed from my body during the operation/procedure for diagnosis, research or teaching purposes and their subsequent disposal by hospital authorities.  I also authorize the release of specimen test results and/or written reports to my treating physician on the hospital medical staff or other referring or consulting physicians involved in my care, at the discretion of the Pathologist or my treating physician.    6.   I consent to the photographing or videotaping of the operations or procedures to be performed, including appropriate portions of my body for medical, scientific, or educational purposes, provided my identity is not revealed by the pictures or by descriptive texts accompanying them.  If the procedure has been photographed/videotaped, the surgeon will obtain the original picture, image, videotape or CD.  The hospital will not be responsible for storage, release or maintenance of the picture, image, tape or CD.    7.   I consent to the presence of a  or observers in the operating room as deemed necessary by my physician or their designees.    8.   I recognize that in the event my procedure results in extended X-Ray/fluoroscopy time, I may develop a skin reaction.    9. If I have a Do Not Attempt Resuscitation (DNAR) order in place,  that status will be suspended while in the operating room, procedural suite, and during the recovery period unless otherwise explicitly stated by me (or a person authorized to consent on my behalf). The surgeon or my attending physician will determine when the applicable recovery period ends for purposes of reinstating the DNAR order.  10. Patients having a sterilization procedure: I understand that if the procedure is successful the results will be permanent and it will therefore be impossible for me to inseminate, conceive, or bear children.  I also understand that the procedure is intended to result in sterility, although the result has not been guaranteed.   11. I acknowledge that my physician has explained sedation/analgesia administration to me including the risk and benefits I consent to the administration of sedation/analgesia as may be necessary or desirable in the judgment of my physician.    I CERTIFY THAT I HAVE READ AND FULLY UNDERSTAND THE ABOVE CONSENT TO OPERATION and/or OTHER PROCEDURE.        ____________________________________       _________________________________      ______________________________  Signature of Patient         Signature of Responsible Person        Printed Name of Responsible Person        ____________________________________      _________________________________      ______________________________       Signature of Witness          Relationship to Patient                       Date                                       Time  Patient Name: Mikayla Mondragon  : 1960    Reviewed: 2024   Printed: 2024  Medical Record #: [id number] Page 1 of 2             STATEMENT OF PHYSICIAN My signature below affirms that prior to the time of the procedure; I have explained to the patient and/or his/her legal representative, the risks and benefits involved in the proposed treatment and any reasonable alternative to the proposed treatment. I have also explained the risks and  benefits involved in refusal of the proposed treatment and alternatives to the proposed treatment and have answered the patient's questions. If I have a significant financial interest in a co-management agreement or a significant financial interest in any product or implant, or other significant relationship used in this procedure/surgery, I have disclosed this and had a discussion with my patient.     _______________________________________________________________ _____________________________  (Signature of Physician)                                                                                         (Date)                                   (Time)  Patient Name: Mikayla HOLLIS Mondragon  : 1960    Reviewed: 2024   Printed: 2024  Medical Record #: [id number] Page 2 of 2

## (undated) NOTE — LETTER
[facility]  [address], [place], [place]    Authorization for Surgical Operation and Procedure                               I hereby authorize Clark Bermudez MD, my physician and his/her assistants (if applicable), which may include medical students, residents, and/or fellows, to perform the following surgical operation/ procedure and administer such anesthesia as may be determined necessary by my physician: Operation/Procedure name (s) ESOPHAGOGASTRODUODENOSCOPY / ENDOSCOPIC ULTRASOUND on Mikayla Mondragon   2.   I recognize that during the surgical operation/procedure, unforeseen conditions may necessitate additional or different procedures than those listed above.  I, therefore, further authorize and request that the above-named surgeon, assistants, or designees perform such procedures as are, in their judgment, necessary and desirable.    3.   My surgeon/physician has discussed prior to my surgery the potential benefits, risks and side effects of this procedure; the likelihood of achieving goals; and potential problems that might occur during recuperation.  They also discussed reasonable alternatives to the procedure, including risks, benefits, and side effects related to the alternatives and risks related to not receiving this procedure.  I have had all my questions answered and I acknowledge that no guarantee has been made as to the result that may be obtained.    4.   Should the need arise during my operation/procedure, which includes change of level of care prior to discharge, I also consent to the administration of blood and/or blood products.  Further, I understand that despite careful testing and screening of blood or blood products by collecting agencies, I may still be subject to ill effects as a result of receiving a blood transfusion and/or blood products.  The following are some, but not all, of the potential risks that can occur: fever and allergic reactions, hemolytic  reactions, transmission of diseases such as Hepatitis, AIDS and Cytomegalovirus (CMV) and fluid overload.  In the event that I wish to have an autologous transfusion of my own blood, or a directed donor transfusion, I will discuss this with my physician.  Check only if Refusing Blood or Blood Products  I understand refusal of blood or blood products as deemed necessary by my physician may have serious consequences to my condition to include possible death. I hereby assume responsibility for my refusal and release the hospital, its personnel, and my physicians from any responsibility for the consequences of my refusal.    o  Refuse   5.   I authorize the use of any specimen, organs, tissues, body parts or foreign objects that may be removed from my body during the operation/procedure for diagnosis, research or teaching purposes and their subsequent disposal by hospital authorities.  I also authorize the release of specimen test results and/or written reports to my treating physician on the hospital medical staff or other referring or consulting physicians involved in my care, at the discretion of the Pathologist or my treating physician.    6.   I consent to the photographing or videotaping of the operations or procedures to be performed, including appropriate portions of my body for medical, scientific, or educational purposes, provided my identity is not revealed by the pictures or by descriptive texts accompanying them.  If the procedure has been photographed/videotaped, the surgeon will obtain the original picture, image, videotape or CD.  The hospital will not be responsible for storage, release or maintenance of the picture, image, tape or CD.    7.   I consent to the presence of a  or observers in the operating room as deemed necessary by my physician or their designees.    8.   I recognize that in the event my procedure results in extended X-Ray/fluoroscopy time, I may develop a skin  reaction.    9. If I have a Do Not Attempt Resuscitation (DNAR) order in place, that status will be suspended while in the operating room, procedural suite, and during the recovery period unless otherwise explicitly stated by me (or a person authorized to consent on my behalf). The surgeon or my attending physician will determine when the applicable recovery period ends for purposes of reinstating the DNAR order.  10. Patients having a sterilization procedure: I understand that if the procedure is successful the results will be permanent and it will therefore be impossible for me to inseminate, conceive, or bear children.  I also understand that the procedure is intended to result in sterility, although the result has not been guaranteed.   11. I acknowledge that my physician has explained sedation/analgesia administration to me including the risk and benefits I consent to the administration of sedation/analgesia as may be necessary or desirable in the judgment of my physician.    I CERTIFY THAT I HAVE READ AND FULLY UNDERSTAND THE ABOVE CONSENT TO OPERATION and/or OTHER PROCEDURE.     ____________________________________  _________________________________        ______________________________  Signature of Patient    Signature of Responsible Person                Printed Name of Responsible Person                                      ____________________________________  _____________________________                ________________________________  Signature of Witness        Date  Time         Relationship to Patient    STATEMENT OF PHYSICIAN My signature below affirms that prior to the time of the procedure; I have explained to the patient and/or his/her legal representative, the risks and benefits involved in the proposed treatment and any reasonable alternative to the proposed treatment. I have also explained the risks and benefits involved in refusal of the proposed treatment and alternatives to the proposed  treatment and have answered the patient's questions. If I have a significant financial interest in a co-management agreement or a significant financial interest in any product or implant, or other significant relationship used in this procedure/surgery, I have disclosed this and had a discussion with my patient.     _____________________________________________________              _____________________________  (Signature of Physician)                                                                                         (Date)                                   (Time)  Patient Name: Mikayla HOLLIS Mondragon      : 1960      Printed: 2024     Medical Record #: [id number]                                      Page 1 of 1

## (undated) NOTE — LETTER
[facility]  Administration of Anesthesia  1. Peg Curb, or _________________________________ acting on her behalf, (Patient) (Dependent/Representative) request to receive anesthesia for my pending procedure/operation/treatment.   A ph bleeding, seizure, cardiac arrest and death. 7. AWARENESS: I understand that it is possible (but unlikely) to have explicit memory of events from the operating room while under general anesthesia.   8. ELECTROCONVULSIVE THERAPY PATIENTS: This consent serve below affirms that prior to the time of the procedure, I have explained to the patient and/or his/her guardian, the risks and benefits of undergoing anesthesia, as well as any reasonable alternatives.     ___________________________________________________

## (undated) NOTE — LETTER
[address], [place]  Authorization for Invasive Procedures  1.  I hereby authorize Dr. Marylou Gerber** , my physician and whomever may be designated as the doctor's assistant, to perform the following operation and/or procedure:  LAUREN allergic reactions, hemolytic reactions, transmission of disease such as hepatitis, AIDS, cytomegalovirus (CMV), and flluid overload.  In the event that I wish to have autologous transfusions of my own blood, or a directed donor transfusion, I will discuss Patient:  ________________________________________________ Date: _________Time: _________    Responsible person in case of minor or unconscious: _____________________________Relationship: ____________     Witness Signature: ________________________________

## (undated) NOTE — LETTER
[address] [place] [address]  Authorization for Surgical Operation or Procedure  Date: ______________       Time: _______________  1.  I hereby authorize Dr. Alejandro Oneil, my physician and the assistant, to perform the following operation an 5. I consent to the photographing of the operations or procedures to be performed for the purposes of advancing medicine, science, and/or education, provided my identity is not revealed.  If the procedure has been videotaped, the physician/surgeon will obta risks and benefits involved in the proposed treatment and any reasonable alternative to the proposed treatment. I have also explained the risks and benefits involved in the refusal of the proposed treatment and have answered the patient's questions.  If I h

## (undated) NOTE — LETTER
06/30/20        Fay Coffman  [address]      Dear Alfred Palomares records indicate that you have outstanding lab work and or testing that was ordered for you and has not yet been completed:  Orders Placed This Encount

## (undated) NOTE — LETTER
[facility]  Administration of Anesthesia  IMikayla agree to be cared for by a physician anesthesiologist alone and/or with a nurse anesthetist, who is specially trained to monitor me and give me medicine to put me to sleep or keep me comfortable during my procedure    I understand that my anesthesiologist and/or anesthetist is not an employee or agent of [facility] or [employer]. He or she works for [facility]    As the patient asking for anesthesia services, I agree to:  Allow the anesthesiologist (anesthesia doctor) to give me medicine and do additional procedures as necessary. Some examples are: Starting or using an “IV” to give me medicine, fluids or blood during my procedure, and having a breathing tube placed to help me breathe when I’m asleep (intubation). In the event that my heart stops working properly, I understand that my anesthesiologist will make every effort to sustain my life, unless otherwise directed by [facility] Do Not Resuscitate documents.  Tell my anesthesia doctor before my procedure:  If I am pregnant.  The last time that I ate or drank.  iii. All of the medicines I take (including prescriptions, herbal supplements, and pills I can buy without a prescription (including street drugs/illegal medications). Failure to inform my anesthesiologist about these medicines may increase my risk of anesthetic complications.  iv.If I am allergic to anything or have had a reaction to anesthesia before.  I understand how the anesthesia medicine will help me (benefits).  I understand that with any type of anesthesia medicine there are risks:  The most common risks are: nausea, vomiting, sore throat, muscle soreness, damage to my eyes, mouth, or teeth (from breathing tube placement).  Rare risks include: remembering what happened during my procedure, allergic reactions to medications, injury to my airway, heart, lungs, vision, nerves, or muscles  and in extremely rare instances death.  My doctor has explained to me other choices available to me for my care (alternatives).  Pregnant Patients (“epidural”):  I understand that the risks of having an epidural (medicine given into my back to help control pain during labor), include itching, low blood pressure, difficulty urinating, headache or slowing of the baby’s heart. Very rare risks include infection, bleeding, seizure, irregular heart rhythms and nerve injury.  Regional Anesthesia (“spinal”, “epidural”, & “nerve blocks”):  I understand that rare but potential complications include headache, bleeding, infection, seizure, irregular heart rhythms, and nerve injury.    _____________________________________________________________________________  Patient (or Representative) Signature/Relationship to Patient  Date   Time    _____________________________________________________________________________   Name (if used)    Language/Organization   Time    _____________________________________________________________________________  Nurse Anesthetist Signature     Date   Time  _____________________________________________________________________________  Anesthesiologist Signature     Date   Time  I have discussed the procedure and information above with the patient (or patient’s representative) and answered their questions. The patient or their representative has agreed to have anesthesia services.    _____________________________________________________________________________  Witness        Date   Time  I have verified that the signature is that of the patient or patient’s representative, and that it was signed before the procedure  Patient Name: Mikayla Mondragon     : 1960                 Printed: 2024 at 1:55 PM    Medical Record #: [id number]                                            Page 1 of 1  ----------ANESTHESIA CONSENT----------

## (undated) NOTE — LETTER
04/27/21        Reina Marquez  [address]      Dear Marvella Severance records indicate that you have outstanding lab work and or testing that was ordered for you and has not yet been completed:   HEPATITIS PANEL, ACUTE (4

## (undated) NOTE — LETTER
[facility]  Administration of Anesthesia  IMikayla agree to be cared for by a physician anesthesiologist alone and/or with a nurse anesthetist, who is specially trained to monitor me and give me medicine to put me to sleep or keep me comfortable during my procedure    I understand that my anesthesiologist and/or anesthetist is not an employee or agent of [facility] or [employer]. He or she works for [facility]    As the patient asking for anesthesia services, I agree to:  Allow the anesthesiologist (anesthesia doctor) to give me medicine and do additional procedures as necessary. Some examples are: Starting or using an “IV” to give me medicine, fluids or blood during my procedure, and having a breathing tube placed to help me breathe when I’m asleep (intubation). In the event that my heart stops working properly, I understand that my anesthesiologist will make every effort to sustain my life, unless otherwise directed by [facility] Do Not Resuscitate documents.  Tell my anesthesia doctor before my procedure:  If I am pregnant.  The last time that I ate or drank.  iii. All of the medicines I take (including prescriptions, herbal supplements, and pills I can buy without a prescription (including street drugs/illegal medications). Failure to inform my anesthesiologist about these medicines may increase my risk of anesthetic complications.  iv.If I am allergic to anything or have had a reaction to anesthesia before.  I understand how the anesthesia medicine will help me (benefits).  I understand that with any type of anesthesia medicine there are risks:  The most common risks are: nausea, vomiting, sore throat, muscle soreness, damage to my eyes, mouth, or teeth (from breathing tube placement).  Rare risks include: remembering what happened during my procedure, allergic reactions to medications, injury to my airway, heart, lungs, vision, nerves, or muscles  and in extremely rare instances death.  My doctor has explained to me other choices available to me for my care (alternatives).  Pregnant Patients (“epidural”):  I understand that the risks of having an epidural (medicine given into my back to help control pain during labor), include itching, low blood pressure, difficulty urinating, headache or slowing of the baby’s heart. Very rare risks include infection, bleeding, seizure, irregular heart rhythms and nerve injury.  Regional Anesthesia (“spinal”, “epidural”, & “nerve blocks”):  I understand that rare but potential complications include headache, bleeding, infection, seizure, irregular heart rhythms, and nerve injury.    _____________________________________________________________________________  Patient (or Representative) Signature/Relationship to Patient  Date   Time    _____________________________________________________________________________   Name (if used)    Language/Organization   Time    _____________________________________________________________________________  Nurse Anesthetist Signature     Date   Time  _____________________________________________________________________________  Anesthesiologist Signature     Date   Time  I have discussed the procedure and information above with the patient (or patient’s representative) and answered their questions. The patient or their representative has agreed to have anesthesia services.    _____________________________________________________________________________  Witness        Date   Time  I have verified that the signature is that of the patient or patient’s representative, and that it was signed before the procedure  Patient Name: Mikayla Mondragon     : 1960                 Printed: 2024 at 11:51 AM    Medical Record #: [id number]                                            Page 1 of 1  ----------ANESTHESIA CONSENT----------

## (undated) NOTE — LETTER
[facility]  Administration of Anesthesia  1. Omari Gonzalez, or _________________________________ acting on her behalf, (Patient) (Dependent/Representative) request to receive anesthesia for my pending procedure/operation/treatment.   A ph bleeding, seizure, cardiac arrest and death. 7. AWARENESS: I understand that it is possible (but unlikely) to have explicit memory of events from the operating room while under general anesthesia.   8. ELECTROCONVULSIVE THERAPY PATIENTS: This consent serve below affirms that prior to the time of the procedure, I have explained to the patient and/or his/her guardian, the risks and benefits of undergoing anesthesia, as well as any reasonable alternatives.     ___________________________________________________